# Patient Record
Sex: FEMALE | Race: WHITE | NOT HISPANIC OR LATINO | Employment: OTHER | URBAN - METROPOLITAN AREA
[De-identification: names, ages, dates, MRNs, and addresses within clinical notes are randomized per-mention and may not be internally consistent; named-entity substitution may affect disease eponyms.]

---

## 2023-09-08 ENCOUNTER — HOSPITAL ENCOUNTER (OUTPATIENT)
Dept: DATA CONVERSION | Facility: HOSPITAL | Age: 73
Discharge: HOME | End: 2023-09-08
Payer: MEDICARE

## 2023-09-08 DIAGNOSIS — E78.00 PURE HYPERCHOLESTEROLEMIA, UNSPECIFIED: ICD-10-CM

## 2023-09-08 DIAGNOSIS — I48.0 PAROXYSMAL ATRIAL FIBRILLATION (MULTI): ICD-10-CM

## 2023-09-08 DIAGNOSIS — E55.9 VITAMIN D DEFICIENCY, UNSPECIFIED: ICD-10-CM

## 2023-09-08 LAB
25(OH)D3 SERPL-MCNC: 46 NG/ML (ref 31–100)
ALBUMIN SERPL-MCNC: 4 GM/DL (ref 3.5–5)
ALBUMIN/GLOB SERPL: 1.3 RATIO (ref 1.5–3)
ALP BLD-CCNC: 43 U/L (ref 35–125)
ALT SERPL-CCNC: 12 U/L (ref 5–40)
ANION GAP SERPL CALCULATED.3IONS-SCNC: 11 MMOL/L (ref 0–19)
APPEARANCE PLAS: CLEAR
AST SERPL-CCNC: 22 U/L (ref 5–40)
BILIRUB SERPL-MCNC: 0.6 MG/DL (ref 0.1–1.2)
BUN SERPL-MCNC: 14 MG/DL (ref 8–25)
BUN/CREAT SERPL: 17.5 RATIO (ref 8–21)
CALCIUM SERPL-MCNC: 9.2 MG/DL (ref 8.5–10.4)
CHLORIDE SERPL-SCNC: 105 MMOL/L (ref 97–107)
CHOLEST SERPL-MCNC: 206 MG/DL (ref 133–200)
CHOLEST/HDLC SERPL: 3 RATIO
CO2 SERPL-SCNC: 24 MMOL/L (ref 24–31)
COLOR SPUN FLD: YELLOW
CREAT SERPL-MCNC: 0.8 MG/DL (ref 0.4–1.6)
DEPRECATED RDW RBC AUTO: 48 FL (ref 37–54)
ERYTHROCYTE [DISTWIDTH] IN BLOOD BY AUTOMATED COUNT: 14.6 % (ref 11.7–15)
FASTING STATUS PATIENT QL REPORTED: ABNORMAL
GFR SERPL CREATININE-BSD FRML MDRD: 78 ML/MIN/1.73 M2
GLOBULIN SER-MCNC: 3 G/DL (ref 1.9–3.7)
GLUCOSE SERPL-MCNC: 88 MG/DL (ref 65–99)
HCT VFR BLD AUTO: 42.4 % (ref 36–44)
HDLC SERPL-MCNC: 68 MG/DL
HGB BLD-MCNC: 14 GM/DL (ref 12–15)
LDLC SERPL CALC-MCNC: 112 MG/DL (ref 65–130)
MCH RBC QN AUTO: 29.6 PG (ref 26–34)
MCHC RBC AUTO-ENTMCNC: 33 % (ref 31–37)
MCV RBC AUTO: 89.6 FL (ref 80–100)
NRBC BLD-RTO: 0 /100 WBC
PLATELET # BLD AUTO: 264 K/UL (ref 150–450)
PMV BLD AUTO: 10.8 CU (ref 7–12.6)
POTASSIUM SERPL-SCNC: 4.9 MMOL/L (ref 3.4–5.1)
PROT SERPL-MCNC: 7 G/DL (ref 5.9–7.9)
RBC # BLD AUTO: 4.73 M/UL (ref 4–4.9)
SODIUM SERPL-SCNC: 140 MMOL/L (ref 133–145)
TRIGL SERPL-MCNC: 132 MG/DL (ref 40–150)
WBC # BLD AUTO: 5.9 K/UL (ref 4.5–11)

## 2023-09-17 PROBLEM — I48.0 PAROXYSMAL ATRIAL FIBRILLATION (MULTI): Status: ACTIVE | Noted: 2023-09-17

## 2023-09-17 PROBLEM — N95.1 MENOPAUSAL SYMPTOM: Status: ACTIVE | Noted: 2023-09-17

## 2023-09-17 PROBLEM — K21.9 GASTROESOPHAGEAL REFLUX DISEASE: Status: ACTIVE | Noted: 2023-09-17

## 2023-09-17 PROBLEM — N81.2 CERVIX PROLAPSED INTO VAGINA: Status: ACTIVE | Noted: 2023-09-17

## 2023-09-17 PROBLEM — J32.9 CHRONIC SINUSITIS: Status: ACTIVE | Noted: 2023-09-17

## 2023-09-17 PROBLEM — J30.9 ALLERGIC RHINITIS: Status: ACTIVE | Noted: 2023-09-17

## 2023-09-17 PROBLEM — J33.9 NASAL POLYPS: Status: ACTIVE | Noted: 2023-09-17

## 2023-09-17 PROBLEM — M48.061 SPINAL STENOSIS OF LUMBAR REGION: Status: ACTIVE | Noted: 2023-09-17

## 2023-09-17 PROBLEM — M54.2 CERVICALGIA: Status: ACTIVE | Noted: 2023-09-17

## 2023-09-17 PROBLEM — D68.9 MEDICATION INDUCED COAGULOPATHY (MULTI): Status: ACTIVE | Noted: 2023-09-17

## 2023-09-17 PROBLEM — T50.905A MEDICATION INDUCED COAGULOPATHY (MULTI): Status: ACTIVE | Noted: 2023-09-17

## 2023-09-17 PROBLEM — E55.9 VITAMIN D DEFICIENCY: Status: ACTIVE | Noted: 2023-09-17

## 2023-09-17 PROBLEM — I35.9 AORTIC VALVE DISEASE: Status: ACTIVE | Noted: 2023-09-17

## 2023-09-17 PROBLEM — I35.1 AORTIC VALVE REGURGITATION: Status: ACTIVE | Noted: 2023-09-17

## 2023-09-17 PROBLEM — M75.51 BURSITIS OF RIGHT SHOULDER: Status: ACTIVE | Noted: 2023-09-17

## 2023-09-17 PROBLEM — E78.00 PURE HYPERCHOLESTEROLEMIA: Status: ACTIVE | Noted: 2023-09-17

## 2023-09-17 RX ORDER — METOPROLOL TARTRATE 25 MG/1
25 TABLET, FILM COATED ORAL 2 TIMES DAILY PRN
COMMUNITY
Start: 2022-08-23

## 2023-09-17 RX ORDER — DIAZEPAM 10 MG/1
TABLET ORAL
COMMUNITY
Start: 2021-12-23 | End: 2023-11-09 | Stop reason: ALTCHOICE

## 2023-09-17 RX ORDER — FAMOTIDINE 20 MG/1
20 TABLET, FILM COATED ORAL 2 TIMES DAILY
COMMUNITY
End: 2024-01-16 | Stop reason: HOSPADM

## 2023-09-17 RX ORDER — GABAPENTIN 100 MG/1
200 CAPSULE ORAL NIGHTLY
COMMUNITY
End: 2024-01-16 | Stop reason: HOSPADM

## 2023-09-17 RX ORDER — APIXABAN 5 MG/1
5 TABLET, FILM COATED ORAL 2 TIMES DAILY
COMMUNITY
Start: 2020-09-29 | End: 2024-01-21

## 2023-09-17 RX ORDER — FLUTICASONE PROPIONATE 50 MCG
1-2 SPRAY, SUSPENSION (ML) NASAL DAILY
COMMUNITY

## 2023-09-17 RX ORDER — CHOLECALCIFEROL (VITAMIN D3) 50 MCG
4000 TABLET ORAL DAILY
COMMUNITY
End: 2024-01-16 | Stop reason: HOSPADM

## 2023-09-17 RX ORDER — SOTALOL HYDROCHLORIDE 80 MG/1
TABLET ORAL
COMMUNITY
Start: 2020-12-17 | End: 2024-02-06

## 2023-09-17 RX ORDER — DENOSUMAB 60 MG/ML
INJECTION SUBCUTANEOUS
COMMUNITY
End: 2024-01-16 | Stop reason: HOSPADM

## 2023-11-02 ENCOUNTER — HOSPITAL ENCOUNTER (OUTPATIENT)
Dept: RADIOLOGY | Facility: HOSPITAL | Age: 73
Discharge: HOME | End: 2023-11-02
Payer: MEDICARE

## 2023-11-02 DIAGNOSIS — S73.192A OTHER SPRAIN OF LEFT HIP, INITIAL ENCOUNTER: ICD-10-CM

## 2023-11-02 DIAGNOSIS — M16.12 UNILATERAL PRIMARY OSTEOARTHRITIS, LEFT HIP: ICD-10-CM

## 2023-11-02 PROCEDURE — 73721 MRI JNT OF LWR EXTRE W/O DYE: CPT | Mod: LT

## 2023-11-09 ENCOUNTER — HOSPITAL ENCOUNTER (OUTPATIENT)
Dept: RADIOLOGY | Facility: CLINIC | Age: 73
Discharge: HOME | End: 2023-11-09
Payer: MEDICARE

## 2023-11-09 ENCOUNTER — OFFICE VISIT (OUTPATIENT)
Dept: ORTHOPEDIC SURGERY | Facility: CLINIC | Age: 73
End: 2023-11-09
Payer: MEDICARE

## 2023-11-09 VITALS — HEIGHT: 62 IN | WEIGHT: 125 LBS | BODY MASS INDEX: 23 KG/M2

## 2023-11-09 DIAGNOSIS — M16.12 PRIMARY OSTEOARTHRITIS OF LEFT HIP: ICD-10-CM

## 2023-11-09 DIAGNOSIS — M25.59 PAIN IN OTHER SPECIFIED JOINT: ICD-10-CM

## 2023-11-09 DIAGNOSIS — M25.552 LEFT HIP PAIN: ICD-10-CM

## 2023-11-09 DIAGNOSIS — M19.19 POST-TRAUMATIC OSTEOARTHRITIS, OTHER SPECIFIED SITE: ICD-10-CM

## 2023-11-09 PROCEDURE — 99203 OFFICE O/P NEW LOW 30 MIN: CPT | Performed by: ORTHOPAEDIC SURGERY

## 2023-11-09 PROCEDURE — 99213 OFFICE O/P EST LOW 20 MIN: CPT | Performed by: ORTHOPAEDIC SURGERY

## 2023-11-09 PROCEDURE — 73502 X-RAY EXAM HIP UNI 2-3 VIEWS: CPT | Mod: LT,FY

## 2023-11-09 PROCEDURE — 1160F RVW MEDS BY RX/DR IN RCRD: CPT | Performed by: ORTHOPAEDIC SURGERY

## 2023-11-09 PROCEDURE — 1159F MED LIST DOCD IN RCRD: CPT | Performed by: ORTHOPAEDIC SURGERY

## 2023-11-09 PROCEDURE — 1125F AMNT PAIN NOTED PAIN PRSNT: CPT | Performed by: ORTHOPAEDIC SURGERY

## 2023-11-09 PROCEDURE — 1036F TOBACCO NON-USER: CPT | Performed by: ORTHOPAEDIC SURGERY

## 2023-11-09 RX ORDER — VANCOMYCIN 1 G/200ML
1000 INJECTION, SOLUTION INTRAVENOUS ONCE
Status: CANCELLED | OUTPATIENT
Start: 2024-01-10 | End: 2023-11-09

## 2023-11-09 RX ORDER — SODIUM CHLORIDE 9 MG/ML
100 INJECTION, SOLUTION INTRAVENOUS CONTINUOUS
Status: CANCELLED | OUTPATIENT
Start: 2024-01-10

## 2023-11-09 RX ORDER — TRANEXAMIC ACID 100 MG/ML
1000 INJECTION, SOLUTION INTRAVENOUS ONCE
Status: CANCELLED | OUTPATIENT
Start: 2024-01-10

## 2023-11-09 ASSESSMENT — PATIENT HEALTH QUESTIONNAIRE - PHQ9
SUM OF ALL RESPONSES TO PHQ9 QUESTIONS 1 AND 2: 0
1. LITTLE INTEREST OR PLEASURE IN DOING THINGS: NOT AT ALL
2. FEELING DOWN, DEPRESSED OR HOPELESS: NOT AT ALL

## 2023-11-09 ASSESSMENT — ENCOUNTER SYMPTOMS
LOSS OF SENSATION IN FEET: 0
OCCASIONAL FEELINGS OF UNSTEADINESS: 0
DEPRESSION: 0

## 2023-11-09 ASSESSMENT — PAIN - FUNCTIONAL ASSESSMENT: PAIN_FUNCTIONAL_ASSESSMENT: 0-10

## 2023-11-09 ASSESSMENT — PAIN SCALES - GENERAL: PAINLEVEL_OUTOF10: 8

## 2023-11-09 ASSESSMENT — PAIN DESCRIPTION - DESCRIPTORS: DESCRIPTORS: ACHING

## 2023-11-09 NOTE — PROGRESS NOTES
Subjective      Chief Complaint   Patient presents with    Left Hip - Pain        No surgery found     HPI  This 73 year young woman presents today with left hip pain (8/10). The patient states that this left hip pain has been worsening and persistent for years. The patient denies trauma or injury to the left hip. The patient states that the left  hip pain is worse with and aggravated by prolonged walking and standing. The patient states that she feels persistent left hip instability. The patient states that this left hip pain impairs their ability to complete normal activities of daily living. The patient has tried Tylenol and ibuprofen with no relief.      JOINT REPLACEMENT HISTORY    Primary joint affected: left hip     Duration of symptoms: years    Joint replacement related history:  Osteoarthritis Severe  Avascular necrosis: Yes    Failed nonsurgical treatment:   NSAID/ COXIB: Yes  Weight loss: Yes  Physical therapy: Yes  Intra-articular injection: No  Braces, orthotics, or assistive devices: Yes    Radiological indications for replacement:   Subchondral cyst: Yes  Subchondral sclerosis: Yes  Periarticular osteophytes: Yes  Joint subluxation: Yes  Joint space narrowing: Yes    Highest level of walking support:   Cane, wheelchair or walker    Pain History:   Pain at rest: Severe  Pain when weigth bearing: Severe  Pain with passive ROM: Severe  Pain related ADL limitation: Severe  Abnormal findings on physical exam: Severe    Ability to walk without significant pain:  Household ambulator or less than 1 block    Is the patients current pain regimen controlling their joint pain? No     CARDIOLOGY:   Negative for chest pain, shortness of breath.   RESPIRATORY:   Negative for chest pain, shortness of breath.   MUSCULOSKELETAL:   See HPI for details.   NEUROLOGY:   Negative for tingling, numbness, weakness.    Objective    There were no vitals filed for this visit.    Physical Exam  GENERAL:          General  Appearance:  This is a pleasant patient with appropriate affect, in no acute distress.   DERMATOLOGY:          Skin: skin at the neck, upper and lower back, and trunk is intact. There is no evidence of skin rash, skin breakdown or ulceration, or atrophic skin change.   EXTREMITIES:          Vascular:  Right, left hands and feet are warm with good color and pulses. Right and left calf and thigh are nontender and nonswollen.   NEUROLOGICAL:          Orientation:  Patient is alert and oriented to person, place, time and situation. Right and left upper and lower extremity motor and sensory examinations are intact.      MUSCULOSKELETAL: Neck: Nontender. No pain with range of motion.  Back: No tenderness. Straight leg test negative bilaterally. right hips: Nontender. No pain or limitation with ROM. Left hip: There is diffuse tenderness over the greater trochanter. There is pain with and limitation of  ROM in external and internal rotation, abduction and abduction.  Lower extremity in good position. Nontender in the left calf.  Right hip: Full active and passive painless range of motion.  Right and left knee: nontender. No pain or limitation with ROM. The patient is seen today walking with a painful gait favoring the left hip while walking.  Previous MRI of the left hip done at [facility] reviewed with the patient in the office today and showed severe osteoarthritis of the left hip with areas of bone-on-bone.  X-rays of the left hip done and read in the office today show severe osteoarthritis of the left hip with loss of joint surface cartilage, sclerosis and spurring.    XR hip left 2 or 3 views    Result Date: 11/9/2023  These images are not reportable by radiology and will not be interpreted by  Radiologists.    MR hip left wo IV contrast    Result Date: 11/2/2023  Interpreted By:  Ever Grove, STUDY: MR HIP LEFT WO IV CONTRAST; 11/2/2023 2:29 pm   INDICATION: Signs/Symptoms:MRI LEFT HIP WO./left hip pain 2  months without injury   COMPARISON: None   ACCESSION NUMBER(S): [id number]   ORDERING CLINICIAN: MACHO ZEPEDA   TECHNIQUE: Multiple, multiplanar sequences were obtained of the left hip.   FINDINGS: No MR evidence of avascular necrosis of the hip is present. No acute fracture or dislocation is seen. Moderate hip effusion is noted in the left.   Areas of complete loss of femoroacetabular articular cartilage can be seen. Areas of mild sclerosis subchondral cystic changes can be seen along the acetabular margin with osteophytic changes identified.   No trochanteric bursitis is identified.   The SI joints are intact. Degenerative disc disease and spondylosis of the lumbar spine is identified.   The marrow is appears grossly normal. The musculature and soft tissues are unremarkable.   There is a macerated labrum noted on this non-arthrographic study not unexpected for patient of this age. No perilabral cyst formation noted.   Iliopsoas tendon is unremarkable. Trace left iliopsoas bursitis is noted.   The gluteus medius and gluteus minimus tendons are intact. However, tendinosis is noted.   The hamstring origins tendinosis is identified.   Included portions visualized contralateral hip reveal osteoarthritic changes.   MR sequences were not optimized for evaluation of intrapelvic contents. However, no gross abnormality is seen.       1. Moderate left hip effusion. 2. Severe left hip osteoarthrosis. 3. Trace left iliopsoas bursitis.   MACRO: None   Signed by: Ever Grove 11/2/2023 3:14 PM Dictation workstation:   [id number]       Ruth was seen today for pain.  Diagnoses and all orders for this visit:  Left hip pain (Primary)  Primary osteoarthritis of left hip  -     XR hip left 2 or 3 views; Future  Options are discussed with the patient in the presence of her  in detail. The patient is instructed regarding activity for balance and support, ice, gentle strengthening and range of motion exercises, and the  appropriate use of Tylenol as needed for pain with its potential adverse reactions and side effects. The patient understands. The patient states that despite all of the treatment listed above, that this left hip pain is disabling and impairs the patient´s ability to do their activities of daily living. The patient has gait instability and is concerned regarding risk for further injury from this persistent left hip instability. The patient requests a discussion of further options including surgical options. On physical examination there is pain with and marked limitation of range of motions especially internal rotation abduction and adduction. There is an unstable gait and a Trendelenburg gait with the patient favoring the left hip. X-ray and MRI show severe osteoarthritis of the left hip with complete loss of joint surface cartilage, bone on bone, sclerosis and degenerative cysts. , Options are discussed with the patient in detail. Left total hip replacement with indications, alternatives, potential risks including but not lkimited to risk of infection, blood clot, blood loss, dislocation, nerve or blood vessel damage, stroke or death, benefits, unforseen risks, the rehab involved and the fact that no guarantee can be made were all discussed with the patient in detail. The patient understands, accepts and wishes to proceed because the left hip pain is disabling and impairs the patients ability to complete activities of daily living that are important to them and because of the multiple episodes of instability the patient wishes to proceed with left total hip replacement. The patient understands and agrees.  Her  and I agree. We will be setting this up for a time that is convenient to the patient and as the schedule allows.  This patient does take Eliquis for atrial fibrillation and has been instructed to stop Eliquis 4 days preoperatively.  She has been seen by her cardiologist, Dr. Watts told her that she is  doing well medically.  I instructed her to call Dr. Watts prior to the operation and is soon as possible to discuss the operation with him and she will do this.,, Please note that this report has been produced using speech recognition software. It may contain errors related to grammar, punctuation or spelling. Electronically signed, but not reviewed.modification and fall precautions and risk for further injury with falling or trauma and the use of a cane    Tirso Medina MD

## 2023-11-20 ENCOUNTER — TELEPHONE (OUTPATIENT)
Dept: ORTHOPEDIC SURGERY | Facility: CLINIC | Age: 73
End: 2023-11-20
Payer: MEDICARE

## 2023-11-20 NOTE — TELEPHONE ENCOUNTER
Patient would like to know if she can have her Prolia injection for Osteoarthritis and her covid vaccine before her surgery on January 10, 2024.  Both of these would be done 30 days before Surgery

## 2023-12-21 ENCOUNTER — OFFICE VISIT (OUTPATIENT)
Dept: CARDIOLOGY | Facility: CLINIC | Age: 73
End: 2023-12-21
Payer: MEDICARE

## 2023-12-21 VITALS
WEIGHT: 128 LBS | OXYGEN SATURATION: 99 % | HEART RATE: 94 BPM | SYSTOLIC BLOOD PRESSURE: 126 MMHG | BODY MASS INDEX: 23.79 KG/M2 | DIASTOLIC BLOOD PRESSURE: 90 MMHG

## 2023-12-21 DIAGNOSIS — I35.1 AORTIC VALVE INSUFFICIENCY, ETIOLOGY OF CARDIAC VALVE DISEASE UNSPECIFIED: ICD-10-CM

## 2023-12-21 DIAGNOSIS — Z01.810 PREOP CARDIOVASCULAR EXAM: Primary | ICD-10-CM

## 2023-12-21 DIAGNOSIS — I48.0 PAROXYSMAL ATRIAL FIBRILLATION (MULTI): ICD-10-CM

## 2023-12-21 PROCEDURE — 99213 OFFICE O/P EST LOW 20 MIN: CPT | Performed by: INTERNAL MEDICINE

## 2023-12-21 PROCEDURE — 1036F TOBACCO NON-USER: CPT | Performed by: INTERNAL MEDICINE

## 2023-12-21 PROCEDURE — 1126F AMNT PAIN NOTED NONE PRSNT: CPT | Performed by: INTERNAL MEDICINE

## 2023-12-21 PROCEDURE — 93010 ELECTROCARDIOGRAM REPORT: CPT | Performed by: INTERNAL MEDICINE

## 2023-12-21 PROCEDURE — 93005 ELECTROCARDIOGRAM TRACING: CPT | Performed by: INTERNAL MEDICINE

## 2023-12-21 PROCEDURE — 1160F RVW MEDS BY RX/DR IN RCRD: CPT | Performed by: INTERNAL MEDICINE

## 2023-12-21 PROCEDURE — 1159F MED LIST DOCD IN RCRD: CPT | Performed by: INTERNAL MEDICINE

## 2023-12-21 ASSESSMENT — ENCOUNTER SYMPTOMS
DYSPNEA ON EXERTION: 1
DEPRESSION: 1
GASTROINTESTINAL NEGATIVE: 1
ENDOCRINE NEGATIVE: 1
FEVER: 0
CONSTITUTIONAL NEGATIVE: 1
SHORTNESS OF BREATH: 1
IRREGULAR HEARTBEAT: 1
NEUROLOGICAL NEGATIVE: 1
LOSS OF SENSATION IN FEET: 0
EYES NEGATIVE: 1
OCCASIONAL FEELINGS OF UNSTEADINESS: 0

## 2023-12-21 ASSESSMENT — PAIN SCALES - GENERAL: PAINLEVEL: 0-NO PAIN

## 2023-12-21 NOTE — PROGRESS NOTES
Subjective      Chief Complaint   Patient presents with    s/c  left hip replacement dr colon    01/10/2023          HPI       Review of Systems   Constitutional: Negative. Negative for fever.   HENT: Negative.     Eyes: Negative.    Cardiovascular:  Positive for dyspnea on exertion and irregular heartbeat.   Respiratory:  Positive for shortness of breath.    Endocrine: Negative.    Skin: Negative.    Musculoskeletal:  Positive for arthritis and joint pain.   Gastrointestinal: Negative.    Genitourinary: Negative.    Neurological: Negative.    All other systems reviewed and are negative.       Past Surgical History:   Procedure Laterality Date    OTHER SURGICAL HISTORY  12/13/2021    Balloon sinuplasty    SMALL INTESTINE SURGERY  2001    twisted bowel repair        Active Ambulatory Problems     Diagnosis Date Noted    Chronic sinusitis 09/17/2023    Allergic rhinitis 09/17/2023    Aortic valve disease 09/17/2023    Aortic valve regurgitation 09/17/2023    Bursitis of right shoulder 09/17/2023    Cervicalgia 09/17/2023    Gastroesophageal reflux disease 09/17/2023    Cervix prolapsed into vagina 09/17/2023    Medication induced coagulopathy (CMS/HCC) 09/17/2023    Menopausal symptom 09/17/2023    Nasal polyps 09/17/2023    Paroxysmal atrial fibrillation (CMS/HCC) 09/17/2023    Pure hypercholesterolemia 09/17/2023    Spinal stenosis of lumbar region 09/17/2023    Vitamin D deficiency 09/17/2023    Left hip pain 11/09/2023    Primary osteoarthritis of left hip 11/09/2023    Preop cardiovascular exam 12/21/2023     Resolved Ambulatory Problems     Diagnosis Date Noted    No Resolved Ambulatory Problems     Past Medical History:   Diagnosis Date    A-fib (CMS/HCC)     Hip pain, acute, left     Personal history of diseases of the blood and blood-forming organs and certain disorders involving the immune mechanism     Personal history of other diseases of the digestive system         Visit Vitals  /90   Pulse 94  "  Wt 58.1 kg (128 lb)   SpO2 99%   BMI 23.79 kg/m²   Smoking Status Never   BSA 1.59 m²        Objective     Constitutional:       Appearance: Healthy appearance.   Eyes:      Pupils: Pupils are equal, round, and reactive to light.   Neck:      Vascular: JVD normal.   Pulmonary:      Breath sounds: Normal breath sounds.   Cardiovascular:      PMI at left midclavicular line. Normal rate. Irregularly irregular rhythm. Normal S1. Normal S2.       Murmurs: There is a grade 2/4 high frequency blowing decrescendo, early diastolic murmur, radiating to the apex.      No gallop.  No click. No rub.   Pulses:     Intact distal pulses.   Edema:     Peripheral edema absent.   Abdominal:      Palpations: Abdomen is soft.      Tenderness: There is no abdominal tenderness.   Musculoskeletal:      Extremities: No clubbing present.Skin:     General: Skin is warm and dry.   Neurological:      General: No focal deficit present.            Lab Review:         Lab Results   Component Value Date    CHOL 206 (H) 09/08/2023    CHOL 212 (H) 09/01/2022    CHOL 215 (H) 08/23/2021     Lab Results   Component Value Date    HDL 68 09/08/2023    HDL 76 09/01/2022    HDL 66 08/23/2021     Lab Results   Component Value Date    LDLCALC 112 09/08/2023    LDLCALC 116 09/01/2022    LDLCALC 131 (H) 08/23/2021     Lab Results   Component Value Date    TRIG 132 09/08/2023    TRIG 98 09/01/2022    TRIG 91 08/23/2021     No components found for: \"CHOLHDL\"     Assessment/Plan     Preop cardiovascular exam  Is a 73-year-old white female who needs a left hip replacement.  She does have a paroxysmal atrial fibrillation and is in the atrial fibrillation today.  Her aortic insufficiency sounds the same.  She is not in congestive heart failure nor she having symptoms of angina factors.  Feel she is low risk for surgery and would clear her for her hip replacement.  I have instructed her to stop her Eliquis 3 days before and will start up afterwards.  Will see her " back in a month or 2 and we will assess if she still in atrial fibrillation at that time further evaluation may be necessary

## 2023-12-21 NOTE — ASSESSMENT & PLAN NOTE
Is a 73-year-old white female who needs a left hip replacement.  She does have a paroxysmal atrial fibrillation and is in the atrial fibrillation today.  Her aortic insufficiency sounds the same.  She is not in congestive heart failure nor she having symptoms of angina factors.  Feel she is low risk for surgery and would clear her for her hip replacement.  I have instructed her to stop her Eliquis 3 days before and will start up afterwards.  Will see her back in a month or 2 and we will assess if she still in atrial fibrillation at that time further evaluation may be necessary

## 2023-12-27 ENCOUNTER — LAB (OUTPATIENT)
Dept: LAB | Facility: LAB | Age: 73
End: 2023-12-27
Payer: MEDICARE

## 2023-12-27 ENCOUNTER — PRE-ADMISSION TESTING (OUTPATIENT)
Dept: PREADMISSION TESTING | Facility: HOSPITAL | Age: 73
End: 2023-12-27
Payer: MEDICARE

## 2023-12-27 VITALS
WEIGHT: 127.87 LBS | OXYGEN SATURATION: 98 % | HEIGHT: 61 IN | TEMPERATURE: 96.8 F | BODY MASS INDEX: 24.14 KG/M2 | DIASTOLIC BLOOD PRESSURE: 83 MMHG | HEART RATE: 106 BPM | SYSTOLIC BLOOD PRESSURE: 125 MMHG

## 2023-12-27 DIAGNOSIS — M16.12 PRIMARY OSTEOARTHRITIS OF LEFT HIP: ICD-10-CM

## 2023-12-27 DIAGNOSIS — M19.19 POST-TRAUMATIC OSTEOARTHRITIS, OTHER SPECIFIED SITE: ICD-10-CM

## 2023-12-27 DIAGNOSIS — Z01.818 PREOP TESTING: Primary | ICD-10-CM

## 2023-12-27 DIAGNOSIS — M25.59 PAIN IN OTHER SPECIFIED JOINT: ICD-10-CM

## 2023-12-27 LAB
ABO GROUP (TYPE) IN BLOOD: NORMAL
ALBUMIN SERPL-MCNC: 4.2 G/DL (ref 3.5–5)
ALP BLD-CCNC: 56 U/L (ref 35–125)
ALT SERPL-CCNC: 11 U/L (ref 5–40)
ANION GAP SERPL CALC-SCNC: 13 MMOL/L
ANTIBODY SCREEN: NORMAL
APPEARANCE UR: ABNORMAL
APTT PPP: 30.8 SECONDS (ref 22–32.5)
AST SERPL-CCNC: 20 U/L (ref 5–40)
BASOPHILS # BLD AUTO: 0.07 X10*3/UL (ref 0–0.1)
BASOPHILS NFR BLD AUTO: 1 %
BILIRUB SERPL-MCNC: 0.5 MG/DL (ref 0.1–1.2)
BILIRUB UR STRIP.AUTO-MCNC: NEGATIVE MG/DL
BUN SERPL-MCNC: 15 MG/DL (ref 8–25)
CALCIUM SERPL-MCNC: 9.8 MG/DL (ref 8.5–10.4)
CAOX CRY #/AREA UR COMP ASSIST: ABNORMAL /HPF
CHLORIDE SERPL-SCNC: 102 MMOL/L (ref 97–107)
CO2 SERPL-SCNC: 26 MMOL/L (ref 24–31)
COLOR UR: YELLOW
CREAT SERPL-MCNC: 0.9 MG/DL (ref 0.4–1.6)
EOSINOPHIL # BLD AUTO: 0.36 X10*3/UL (ref 0–0.4)
EOSINOPHIL NFR BLD AUTO: 4.9 %
ERYTHROCYTE [DISTWIDTH] IN BLOOD BY AUTOMATED COUNT: 13.5 % (ref 11.5–14.5)
GFR SERPL CREATININE-BSD FRML MDRD: 67 ML/MIN/1.73M*2
GLUCOSE SERPL-MCNC: 75 MG/DL (ref 65–99)
GLUCOSE UR STRIP.AUTO-MCNC: NORMAL MG/DL
HCT VFR BLD AUTO: 46.1 % (ref 36–46)
HGB BLD-MCNC: 14.4 G/DL (ref 12–16)
HOLD SPECIMEN: NORMAL
HYALINE CASTS #/AREA URNS AUTO: ABNORMAL /LPF
IMM GRANULOCYTES # BLD AUTO: 0.02 X10*3/UL (ref 0–0.5)
IMM GRANULOCYTES NFR BLD AUTO: 0.3 % (ref 0–0.9)
INR PPP: 1.1 (ref 0.9–1.2)
KETONES UR STRIP.AUTO-MCNC: ABNORMAL MG/DL
LEUKOCYTE ESTERASE UR QL STRIP.AUTO: NEGATIVE
LYMPHOCYTES # BLD AUTO: 2.21 X10*3/UL (ref 0.8–3)
LYMPHOCYTES NFR BLD AUTO: 30.2 %
MCH RBC QN AUTO: 29 PG (ref 26–34)
MCHC RBC AUTO-ENTMCNC: 31.2 G/DL (ref 32–36)
MCV RBC AUTO: 93 FL (ref 80–100)
MONOCYTES # BLD AUTO: 0.95 X10*3/UL (ref 0.05–0.8)
MONOCYTES NFR BLD AUTO: 13 %
MUCOUS THREADS #/AREA URNS AUTO: ABNORMAL /LPF
NEUTROPHILS # BLD AUTO: 3.7 X10*3/UL (ref 1.6–5.5)
NEUTROPHILS NFR BLD AUTO: 50.6 %
NITRITE UR QL STRIP.AUTO: NEGATIVE
NRBC BLD-RTO: 0 /100 WBCS (ref 0–0)
PH UR STRIP.AUTO: 5 [PH]
PLATELET # BLD AUTO: 280 X10*3/UL (ref 150–450)
POTASSIUM SERPL-SCNC: 4.7 MMOL/L (ref 3.4–5.1)
PROT SERPL-MCNC: 6.9 G/DL (ref 5.9–7.9)
PROT UR STRIP.AUTO-MCNC: ABNORMAL MG/DL
PROTHROMBIN TIME: 11.9 SECONDS (ref 9.3–12.7)
RBC # BLD AUTO: 4.96 X10*6/UL (ref 4–5.2)
RBC # UR STRIP.AUTO: NEGATIVE /UL
RBC #/AREA URNS AUTO: ABNORMAL /HPF
RH FACTOR (ANTIGEN D): NORMAL
SODIUM SERPL-SCNC: 141 MMOL/L (ref 133–145)
SP GR UR STRIP.AUTO: 1.03
SQUAMOUS #/AREA URNS AUTO: ABNORMAL /HPF
UROBILINOGEN UR STRIP.AUTO-MCNC: ABNORMAL MG/DL
WBC # BLD AUTO: 7.3 X10*3/UL (ref 4.4–11.3)
WBC #/AREA URNS AUTO: ABNORMAL /HPF

## 2023-12-27 PROCEDURE — 99204 OFFICE O/P NEW MOD 45 MIN: CPT | Performed by: NURSE PRACTITIONER

## 2023-12-27 PROCEDURE — 36415 COLL VENOUS BLD VENIPUNCTURE: CPT

## 2023-12-27 PROCEDURE — 86900 BLOOD TYPING SEROLOGIC ABO: CPT

## 2023-12-27 PROCEDURE — 80053 COMPREHEN METABOLIC PANEL: CPT

## 2023-12-27 PROCEDURE — 86850 RBC ANTIBODY SCREEN: CPT

## 2023-12-27 PROCEDURE — 85610 PROTHROMBIN TIME: CPT

## 2023-12-27 PROCEDURE — 85025 COMPLETE CBC W/AUTO DIFF WBC: CPT

## 2023-12-27 PROCEDURE — 86901 BLOOD TYPING SEROLOGIC RH(D): CPT

## 2023-12-27 PROCEDURE — 94760 N-INVAS EAR/PLS OXIMETRY 1: CPT

## 2023-12-27 PROCEDURE — 85730 THROMBOPLASTIN TIME PARTIAL: CPT

## 2023-12-27 PROCEDURE — 87081 CULTURE SCREEN ONLY: CPT | Mod: WESLAB | Performed by: NURSE PRACTITIONER

## 2023-12-27 PROCEDURE — 81001 URINALYSIS AUTO W/SCOPE: CPT

## 2023-12-27 RX ORDER — CHLORHEXIDINE GLUCONATE ORAL RINSE 1.2 MG/ML
15 SOLUTION DENTAL AS NEEDED
Qty: 120 ML | Refills: 0 | Status: SHIPPED | OUTPATIENT
Start: 2024-01-09 | End: 2024-01-16 | Stop reason: HOSPADM

## 2023-12-27 ASSESSMENT — ENCOUNTER SYMPTOMS
CONSTITUTIONAL NEGATIVE: 1
NEUROLOGICAL NEGATIVE: 1
EYES NEGATIVE: 1
GASTROINTESTINAL NEGATIVE: 1
PSYCHIATRIC NEGATIVE: 1

## 2023-12-27 ASSESSMENT — CHADS2 SCORE
CHF: NO
AGE GREATER THAN OR EQUAL TO 75: NO
DIABETES: NO
CHADS2 SCORE: 0
PRIOR STROKE OR TIA OR THROMBOEMBOLISM: NO
HYPERTENSION: NO

## 2023-12-27 ASSESSMENT — DUKE ACTIVITY SCORE INDEX (DASI)
CAN YOU CLIMB A FLIGHT OF STAIRS OR WALK UP A HILL: NO
CAN YOU DO LIGHT WORK AROUND THE HOUSE LIKE DUSTING OR WASHING DISHES: YES
CAN YOU WALK INDOORS, SUCH AS AROUND YOUR HOUSE: YES
CAN YOU DO HEAVY WORK AROUND THE HOUSE LIKE SCRUBBING FLOORS OR LIFTING AND MOVING HEAVY FURNITURE: NO
CAN YOU DO YARD WORK LIKE RAKING LEAVES, WEEDING OR PUSHING A MOWER: NO
CAN YOU RUN A SHORT DISTANCE: NO
DASI METS SCORE: 4.6
TOTAL_SCORE: 15.2
CAN YOU DO MODERATE WORK AROUND THE HOUSE LIKE VACUUMING, SWEEPING FLOORS OR CARRYING GROCERIES: NO
CAN YOU TAKE CARE OF YOURSELF (EAT, DRESS, BATHE, OR USE TOILET): YES
CAN YOU PARTICIPATE IN STRENOUS SPORTS LIKE SWIMMING, SINGLES TENNIS, FOOTBALL, BASKETBALL, OR SKIING: NO
CAN YOU PARTICIPATE IN MODERATE RECREATIONAL ACTIVITIES LIKE GOLF, BOWLING, DANCING, DOUBLES TENNIS OR THROWING A BASEBALL OR FOOTBALL: NO
CAN YOU WALK A BLOCK OR TWO ON LEVEL GROUND: YES
CAN YOU HAVE SEXUAL RELATIONS: YES

## 2023-12-27 ASSESSMENT — PAIN - FUNCTIONAL ASSESSMENT: PAIN_FUNCTIONAL_ASSESSMENT: 0-10

## 2023-12-27 ASSESSMENT — PAIN DESCRIPTION - DESCRIPTORS: DESCRIPTORS: ACHING

## 2023-12-27 ASSESSMENT — PAIN SCALES - GENERAL: PAINLEVEL_OUTOF10: 7

## 2023-12-27 NOTE — PREPROCEDURE INSTRUCTIONS
Medication List            Accurate as of December 27, 2023  9:07 AM. Always use your most recent med list.                cholecalciferol 50 MCG (2000 UT) tablet  Commonly known as: Vitamin D-3  Medication Adjustments for Surgery: Stop 7 days before surgery     Eliquis 5 mg tablet  Generic drug: apixaban  Notes to patient: PER DR. VILLASENOR LAST DOSE SUNDAY, PATIENT WILL VERIFY WITH DR. VILLASENOR     famotidine 20 mg tablet  Commonly known as: Pepcid  Medication Adjustments for Surgery: Continue until night before surgery     fluticasone 50 mcg/actuation nasal spray  Commonly known as: Flonase  Medication Adjustments for Surgery: Take morning of surgery with sip of water, no other fluids     gabapentin 100 mg capsule  Commonly known as: Neurontin  Medication Adjustments for Surgery: Continue until night before surgery     metoprolol tartrate 25 mg tablet  Commonly known as: Lopressor  Medication Adjustments for Surgery: Take morning of surgery with sip of water, no other fluids     Prolia 60 mg/mL syringe  Generic drug: denosumab     sotalol 80 mg tablet  Commonly known as: Betapace  Medication Adjustments for Surgery: Take morning of surgery with sip of water, no other fluids                              NPO Instructions:    Do not eat any food after midnight the night before your surgery/procedure.  You may have clear liquids until TWO hours before surgery/procedure. This includes water, black tea/coffee, (no milk or cream) apple juice and electrolyte drinks (Gatorade).    Additional Instructions:     Day of Surgery:  Review your medication instructions, take indicated medications  You may have clear liquids until TWO hours before surgery/procedure.  This includes water, black tea/coffee, (no milk or cream) apple juice and electrolyte drinks (Gatorade)  Wear  comfortable loose fitting clothing  Do not use moisturizers, creams, lotions or perfume  All jewelry and valuables should be left at home   Home Preoperative  Antibacterial Shower    What is a home preoperative antibacterial shower?  This shower is a way of cleaning the skin with a germ killing solution before surgery. The solution contains chlorhexidine, commonly known as CHG. CHG is a skin cleanser with germ killing ability. Let your doctor know if you are allergic to chlorhexidine.      Why do I need to take a preoperative antibacterial shower?  Skin is not sterile. It is best to try to make your skin as free of germs as possible before surgery. Proper cleansing with a germ killing soap before surgery can lower the number of germs on your skin. This helps to reduce the risk of infection at the surgical site. Following the instructions listed below will help you prepare your skin for surgery.    How do I use the solution?      Steps: Begin using your CHG soap FIVE DAYS BEFORE your scheduled surgery on ___START JANUARY 6  First, wash and rinse your hair.   CHG away soap away from ear canals and eyes.  Rinse completely, do not condition. Hair extensions should be removed.  Wash your face with your normal soap and rinse.  Apply the CHG solution to a clean wet washcloth. Turn the water off or move away from the water spray to avoid premature rinsing of the CHG soap as you are applying.  Firmly lather your entire body from neck down. Do not use on face.  Pay special attention to the areas(s) where your incision(s) will be located unless they are on your face.  Avoid scrubbing your skin too hard.  The important point is to have the CHG soap sit on your skin for 3 minutes.  DO NOT wash with regular soap after you have used the CHG soap solution.  Pat yourself dry with a clean, freshly laundered towel.  DO NOT apply powders, deodorants or lotions.  Dress in clean, freshly laundered night clothes.  Be sure to sleep with clean, freshly laundered sheets.  Be aware that CHG will cause stains on fabrics; if you wash them with bleach after use. Rinse your washcloth and other linens  that have contact with CHG completely. Use only non-chlorine detergents to launder the items used.  The morning of surgery is the fifth day. Repeat the above steps and dress in clean comfortable clothing.   Patient Information: Oral/Dental Rinse    What is oral/dental rinse?  It is a mouthwash. It is a way of cleaning the mouth with a germ killing solution before your surgery. The solution contains chlorhexidine, commonly know as CHG.  It is used inside the mouth to kill bacteria known as Staphylococcus aureus.  Let your doctor know if you are allergic to chlorhexidine.    Why do I need to use CHG oral/dental rinse?  The CHG oral/dental rinse helps to kill bacteria in your mouth known as Staphylococcus aureus. This reduces the risk of infection at the surgical site.    Using your CHG oral/dental rinse.  AT YOUR PHARMACY  STEPS: use your CHG oral/dental rinse after you brush your teeth the night before (at bedtime) and the morning of your surgery. Follow the directions on your prescription label.  Use the cap on the container to measure 15ml (fill cap to fill line)  Swish (gargle if you can) the mouthwash in your mouth for at least 30 seconds, (do not swallow) spit out.  After you use your CHG rinse, do not rinse your mouth with water, drink or eat. Please refer to prescription label for the appropriate time to resume oral intake.    What side effects might I have using the CHG oral/dental rinse?  CHG rinse will stick to the plaque on the teeth. Brush and floss just before use. Teeth brushing will help avoid staining of plaque during use.  PAT DISCHARGE INSTRUCTIONS    Please call the Same Day Surgery (SDS) Department of the hospital where your procedure will be performed after 2:00 PM the day before your surgery. If you are scheduled on a Monday, or a Tuesday following a Monday holiday, you will need to call on the last business day prior to your surgery.    [facility]  [address], [address] [phone]      Please let your surgeon know if:      You develop any open sores, shingles, burning or painful urination as these may increase your risk of an infection.   You no longer wish to have the surgery.   Any other personal circumstances change that may lead to the need to cancel or defer this surgery-such as being sick or getting admitted to any hospital within one week of your planned procedure.    Your contact details change, such as a change of address or phone number.    Starting now:     Please DO NOT drink alcohol or smoke for 24 hours before surgery. It is well known that quitting smoking can make a huge difference to your health and recovery from surgery. The longer you abstain from smoking, the better your chances of a healthy recovery. If you need help with quitting, call 8-935-QUIT-NOW to be connected to a trained counselor who will discuss the best methods to help you quit.     Before your surgery:    Please stop all supplements 7 days prior to surgery. Or as directed by your surgeon.   Please stop taking NSAID pain medicine such as Advil and Motrin 7 days before surgery.    If you develop any fever, cough, cold, rashes, cuts, scratches, scrapes, urinary symptoms or infection anywhere on your body (including teeth and gums) prior to surgery, please call your surgeon’s office as soon as possible. This may require treatment to reduce the chance of cancellation on the day of surgery.    The day before your surgery:   DIET- Do not eat any food after MIDNIGHT. May have 10 ounces of CLEAR LIQUIDS until TWO HOURS before your arrival time. This includes water, black tea or coffee (no milk ir cream), apple juice and electrolyte drinks (Gatorade). May chew gum until TWO hours before your surgery time.   Get a good night’s rest.  Use the special soap for bathing if you have been instructed to use one.    Scheduled surgery times may change and you will  be notified if this occurs - please check your personal voicemail for any updates.     On the morning of surgery:   Wear comfortable, loose fitting clothes which open in the front. Please do not wear moisturizers, creams, lotions, makeup or perfume.    Please bring with you to surgery:   Photo ID and insurance card   Current list of medicines and allergies   Pacemaker/ Defibrillator/Heart stent cards   CPAP machine and mask    Slings/ splints/ crutches   A copy of your complete advanced directive/DHPOA.    Please do NOT bring with you to surgery:   All jewelry and valuables should be left at home.   Prosthetic devices such as contact lenses, hearing aids, dentures, eyelash extensions, hairpins and body piercings must be removed prior to going in to the surgical suite.    After outpatient surgery:   A responsible adult MUST accompany you at the time of discharge and stay with you for 24 hours after your surgery. You may NOT drive yourself home after surgery.    Do not drive, operate machinery, make critical decisions or do activities that require co-ordination or balance until after a night’s sleep.   Do not drink alcoholic beverages for 24 hours.   Instructions for resuming your medications will be provided by your surgeon.    CALL YOUR DOCTOR AFTER SURGERY IF YOU HAVE:     Chills and/or a fever of 101° F or higher.    Redness, swelling, pus or drainage from your surgical wound or a bad smell from the wound.    Lightheadedness, fainting or confusion.    Persistent vomiting (throwing up) and are not able to eat or drink for 12 hours.    Three or more loose, watery bowel movements in 24 hours (diarrhea).   Difficulty or pain while urinating( after non-urological surgery)    Pain and swelling in your legs, especially if it is only on one side.    Difficulty breathing or are breathing faster than normal.    Any new concerning symptoms.    Who should I contact if I have questions about the CHG oral/dental rinse?  Please  call [facility], [facility] at [phone] if you have any questions.    Who should I call if I have any questions regarding the use of CHG soap?  Call the [facility] at [phone] if you have any questions.

## 2023-12-27 NOTE — CPM/PAT H&P
CPM/PAT Evaluation       Name: Ruth Veloz (Ruth Veloz)  /Age: 1950/73 y.o.     In-Person       Chief Complaint: left hip pain    HPI  Pt is a 73 year old female with left hip osteoarthritis. Pt stated over the past year she has had worsening left hip pain. Pt denies trauma or injury to her left hip. Pt tried cortisone injections and physical therapy with no improvement from the pain. Pt describes the pain as an aching sharp pain that radiates from her left hip to her left ankle. The left hip pain is aggravated by walking and ROM of left hip. Pt has been taking Tylenol that has helped improve the pain. Pt denies numbness and tingling in her left foot. Pt denies falls.    Past Medical History:   Diagnosis Date    A-fib (CMS/[facility])     GERD (gastroesophageal reflux disease)     Hip pain, acute, left     Osteopenia     Personal history of diseases of the blood and blood-forming organs and certain disorders involving the immune mechanism     History of autoimmune disorder    Personal history of other diseases of the digestive system     History of gastroesophageal reflux (GERD)       Past Surgical History:   Procedure Laterality Date    OTHER SURGICAL HISTORY  2021    Balloon sinuplasty    SMALL INTESTINE SURGERY      twisted bowel repair       Social History     Tobacco Use    Smoking status: Never    Smokeless tobacco: Never   Substance Use Topics    Alcohol use: Not Currently     Social History     Substance and Sexual Activity   Drug Use Never     Family History   Problem Relation Name Age of Onset    Other (leaky valve) Mother      Stroke Mother      Hypertension Mother      Kidney failure Father      Cushing syndrome Sister      No Known Problems Brother      No Known Problems Daughter       Current Outpatient Medications   Medication Sig Dispense Refill    cholecalciferol (Vitamin D-3) 50 MCG ( UT) tablet Take 2 tablets (4,000 Units) by mouth once daily.      denosumab  "(Prolia) 60 mg/mL syringe Inject under the skin.      Eliquis 5 mg tablet Take 1 tablet (5 mg) by mouth 2 times a day.      famotidine (Pepcid) 20 mg tablet Take 1 tablet (20 mg) by mouth 2 times a day. As needed      fluticasone (Flonase) 50 mcg/actuation nasal spray Administer 1-2 sprays into each nostril once daily.      gabapentin (Neurontin) 100 mg capsule Take 2 capsules (200 mg) by mouth once daily at bedtime.      metoprolol tartrate (Lopressor) 25 mg tablet Take 1 tablet (25 mg) by mouth 2 times a day as needed.      sotalol (Betapace) 80 mg tablet Take by mouth. one tab in the am and 1/2 at night Orally       No current facility-administered medications for this visit.       Allergies   Allergen Reactions    Amoxicillin Hives    Amoxicillin-Pot Clavulanate Hives    Flunisolide Hives    Penicillin Hives    Sulfa (Sulfonamide Antibiotics) Unknown     Review of Systems   Constitutional: Negative.    HENT: Negative.     Eyes: Negative.    Respiratory:          Mild SOB on exertion   Cardiovascular:         Irregular heart rhythm   Gastrointestinal: Negative.    Genitourinary: Negative.    Musculoskeletal:         See HPI   Skin: Negative.    Neurological: Negative.    Psychiatric/Behavioral: Negative.         /83   Pulse 106   Temp 36 °C (96.8 °F) (Temporal)   Ht 1.549 m (5' 1\")   Wt 58 kg (127 lb 13.9 oz)   SpO2 98%   BMI 24.16 kg/m²       Physical Exam  Vitals reviewed.   Constitutional:       Appearance: Normal appearance.   HENT:      Head: Normocephalic and atraumatic.      Nose: Nose normal.   Cardiovascular:      Rate and Rhythm: Normal rate. Rhythm irregular.      Pulses: Normal pulses.      Heart sounds: Murmur heard.   Pulmonary:      Effort: Pulmonary effort is normal.      Breath sounds: Normal breath sounds.   Abdominal:      General: Bowel sounds are normal.      Palpations: Abdomen is soft.   Musculoskeletal:      Cervical back: Normal range of motion.      Comments: Left hip pain " with ROM   Skin:     General: Skin is warm and dry.   Neurological:      General: No focal deficit present.      Mental Status: She is alert and oriented to person, place, and time.   Psychiatric:         Mood and Affect: Mood normal.         Behavior: Behavior normal.         Thought Content: Thought content normal.        PAT AIRWAY:   Airway:     Mallampati::  II    TM distance::  >3 FB    Neck ROM::  Full    DASI: 15.2  METS: 4.6  CHADS:1.9%  RCRI: 0.4%  STOP BAN    Assessment and Plan:     Primary Osteoarthritis of left hip: hip replacement total uncement unilat Depuy implants  Atrial fibrillation: Pt is followed by Dr. Watts; Pt is taking Eliquis, Metoprolol tartrate, and sotalol.  Aortic insufficiency: followed by Dr Watts; Cardiac clearance completed by Dr Watts; last ECHO: 2022 : The left ventricular chamber size is mildly dilated. There is mildly decreased left ventricular systolic function. Estimated ejection fraction is 50-54%. The left atrial size is mild to moderately dilated. Right atrial cavity size is mildly dilated. Mild to moderate aortic regurgitation. Mild to moderate mitral regurgitation.    Ginny Han, APRN-CNP

## 2023-12-27 NOTE — H&P (VIEW-ONLY)
CPM/PAT Evaluation       Name: Ruth Veloz (Ruth Veloz)  /Age: 1950/73 y.o.     In-Person       Chief Complaint: left hip pain    HPI  Pt is a 73 year old female with left hip osteoarthritis. Pt stated over the past year she has had worsening left hip pain. Pt denies trauma or injury to her left hip. Pt tried cortisone injections and physical therapy with no improvement from the pain. Pt describes the pain as an aching sharp pain that radiates from her left hip to her left ankle. The left hip pain is aggravated by walking and ROM of left hip. Pt has been taking Tylenol that has helped improve the pain. Pt denies numbness and tingling in her left foot. Pt denies falls.    Past Medical History:   Diagnosis Date    A-fib (CMS/[facility])     GERD (gastroesophageal reflux disease)     Hip pain, acute, left     Osteopenia     Personal history of diseases of the blood and blood-forming organs and certain disorders involving the immune mechanism     History of autoimmune disorder    Personal history of other diseases of the digestive system     History of gastroesophageal reflux (GERD)       Past Surgical History:   Procedure Laterality Date    OTHER SURGICAL HISTORY  2021    Balloon sinuplasty    SMALL INTESTINE SURGERY      twisted bowel repair       Social History     Tobacco Use    Smoking status: Never    Smokeless tobacco: Never   Substance Use Topics    Alcohol use: Not Currently     Social History     Substance and Sexual Activity   Drug Use Never     Family History   Problem Relation Name Age of Onset    Other (leaky valve) Mother      Stroke Mother      Hypertension Mother      Kidney failure Father      Cushing syndrome Sister      No Known Problems Brother      No Known Problems Daughter       Current Outpatient Medications   Medication Sig Dispense Refill    cholecalciferol (Vitamin D-3) 50 MCG ( UT) tablet Take 2 tablets (4,000 Units) by mouth once daily.      denosumab  "(Prolia) 60 mg/mL syringe Inject under the skin.      Eliquis 5 mg tablet Take 1 tablet (5 mg) by mouth 2 times a day.      famotidine (Pepcid) 20 mg tablet Take 1 tablet (20 mg) by mouth 2 times a day. As needed      fluticasone (Flonase) 50 mcg/actuation nasal spray Administer 1-2 sprays into each nostril once daily.      gabapentin (Neurontin) 100 mg capsule Take 2 capsules (200 mg) by mouth once daily at bedtime.      metoprolol tartrate (Lopressor) 25 mg tablet Take 1 tablet (25 mg) by mouth 2 times a day as needed.      sotalol (Betapace) 80 mg tablet Take by mouth. one tab in the am and 1/2 at night Orally       No current facility-administered medications for this visit.       Allergies   Allergen Reactions    Amoxicillin Hives    Amoxicillin-Pot Clavulanate Hives    Flunisolide Hives    Penicillin Hives    Sulfa (Sulfonamide Antibiotics) Unknown     Review of Systems   Constitutional: Negative.    HENT: Negative.     Eyes: Negative.    Respiratory:          Mild SOB on exertion   Cardiovascular:         Irregular heart rhythm   Gastrointestinal: Negative.    Genitourinary: Negative.    Musculoskeletal:         See HPI   Skin: Negative.    Neurological: Negative.    Psychiatric/Behavioral: Negative.         /83   Pulse 106   Temp 36 °C (96.8 °F) (Temporal)   Ht 1.549 m (5' 1\")   Wt 58 kg (127 lb 13.9 oz)   SpO2 98%   BMI 24.16 kg/m²       Physical Exam  Vitals reviewed.   Constitutional:       Appearance: Normal appearance.   HENT:      Head: Normocephalic and atraumatic.      Nose: Nose normal.   Cardiovascular:      Rate and Rhythm: Normal rate. Rhythm irregular.      Pulses: Normal pulses.      Heart sounds: Murmur heard.   Pulmonary:      Effort: Pulmonary effort is normal.      Breath sounds: Normal breath sounds.   Abdominal:      General: Bowel sounds are normal.      Palpations: Abdomen is soft.   Musculoskeletal:      Cervical back: Normal range of motion.      Comments: Left hip pain " with ROM   Skin:     General: Skin is warm and dry.   Neurological:      General: No focal deficit present.      Mental Status: She is alert and oriented to person, place, and time.   Psychiatric:         Mood and Affect: Mood normal.         Behavior: Behavior normal.         Thought Content: Thought content normal.        PAT AIRWAY:   Airway:     Mallampati::  II    TM distance::  >3 FB    Neck ROM::  Full    DASI: 15.2  METS: 4.6  CHADS:1.9%  RCRI: 0.4%  STOP BAN    Assessment and Plan:     Primary Osteoarthritis of left hip: hip replacement total uncement unilat Depuy implants  Atrial fibrillation: Pt is followed by Dr. Watts; Pt is taking Eliquis, Metoprolol tartrate, and sotalol.  Aortic insufficiency: followed by Dr Watts; Cardiac clearance completed by Dr Watts; last ECHO: 2022 : The left ventricular chamber size is mildly dilated. There is mildly decreased left ventricular systolic function. Estimated ejection fraction is 50-54%. The left atrial size is mild to moderately dilated. Right atrial cavity size is mildly dilated. Mild to moderate aortic regurgitation. Mild to moderate mitral regurgitation.    Ginny Han, APRN-CNP

## 2023-12-29 LAB — STAPHYLOCOCCUS SPEC CULT: NORMAL

## 2024-01-02 ENCOUNTER — TELEPHONE (OUTPATIENT)
Dept: CARDIOLOGY | Facility: CLINIC | Age: 74
End: 2024-01-02
Payer: MEDICARE

## 2024-01-03 ENCOUNTER — APPOINTMENT (OUTPATIENT)
Dept: CARDIOLOGY | Facility: CLINIC | Age: 74
End: 2024-01-03
Payer: MEDICARE

## 2024-01-03 ENCOUNTER — APPOINTMENT (OUTPATIENT)
Dept: PREADMISSION TESTING | Facility: HOSPITAL | Age: 74
End: 2024-01-03
Payer: MEDICARE

## 2024-01-08 ENCOUNTER — APPOINTMENT (OUTPATIENT)
Dept: CARDIOLOGY | Facility: CLINIC | Age: 74
End: 2024-01-08
Payer: MEDICARE

## 2024-01-10 ENCOUNTER — ANESTHESIA EVENT (OUTPATIENT)
Dept: OPERATING ROOM | Facility: HOSPITAL | Age: 74
DRG: 470 | End: 2024-01-10
Payer: MEDICARE

## 2024-01-10 ENCOUNTER — APPOINTMENT (OUTPATIENT)
Dept: RADIOLOGY | Facility: HOSPITAL | Age: 74
DRG: 470 | End: 2024-01-10
Payer: MEDICARE

## 2024-01-10 ENCOUNTER — HOSPITAL ENCOUNTER (INPATIENT)
Facility: HOSPITAL | Age: 74
LOS: 3 days | Discharge: HOME HEALTH | DRG: 470 | End: 2024-01-16
Attending: ORTHOPAEDIC SURGERY | Admitting: ORTHOPAEDIC SURGERY
Payer: MEDICARE

## 2024-01-10 ENCOUNTER — ANESTHESIA (OUTPATIENT)
Dept: OPERATING ROOM | Facility: HOSPITAL | Age: 74
DRG: 470 | End: 2024-01-10
Payer: MEDICARE

## 2024-01-10 DIAGNOSIS — M16.12 PRIMARY OSTEOARTHRITIS OF LEFT HIP: ICD-10-CM

## 2024-01-10 DIAGNOSIS — Z96.649 DISLOCATION OF HIP JOINT PROSTHESIS, INITIAL ENCOUNTER (CMS-HCC): Primary | ICD-10-CM

## 2024-01-10 DIAGNOSIS — T84.029A DISLOCATION OF HIP JOINT PROSTHESIS, INITIAL ENCOUNTER (CMS-HCC): Primary | ICD-10-CM

## 2024-01-10 PROBLEM — I34.0 MITRAL REGURGITATION: Status: ACTIVE | Noted: 2024-01-10

## 2024-01-10 LAB
ABO GROUP (TYPE) IN BLOOD: NORMAL
RH FACTOR (ANTIGEN D): NORMAL

## 2024-01-10 PROCEDURE — 3700000002 HC GENERAL ANESTHESIA TIME - EACH INCREMENTAL 1 MINUTE: Performed by: ORTHOPAEDIC SURGERY

## 2024-01-10 PROCEDURE — 94760 N-INVAS EAR/PLS OXIMETRY 1: CPT

## 2024-01-10 PROCEDURE — 2500000004 HC RX 250 GENERAL PHARMACY W/ HCPCS (ALT 636 FOR OP/ED): Performed by: INTERNAL MEDICINE

## 2024-01-10 PROCEDURE — C1713 ANCHOR/SCREW BN/BN,TIS/BN: HCPCS | Performed by: ORTHOPAEDIC SURGERY

## 2024-01-10 PROCEDURE — C1776 JOINT DEVICE (IMPLANTABLE): HCPCS | Performed by: ORTHOPAEDIC SURGERY

## 2024-01-10 PROCEDURE — G0378 HOSPITAL OBSERVATION PER HR: HCPCS

## 2024-01-10 PROCEDURE — 2500000004 HC RX 250 GENERAL PHARMACY W/ HCPCS (ALT 636 FOR OP/ED): Performed by: ORTHOPAEDIC SURGERY

## 2024-01-10 PROCEDURE — 2500000005 HC RX 250 GENERAL PHARMACY W/O HCPCS: Performed by: ANESTHESIOLOGIST ASSISTANT

## 2024-01-10 PROCEDURE — 99100 ANES PT EXTEME AGE<1 YR&>70: CPT | Performed by: STUDENT IN AN ORGANIZED HEALTH CARE EDUCATION/TRAINING PROGRAM

## 2024-01-10 PROCEDURE — 94761 N-INVAS EAR/PLS OXIMETRY MLT: CPT

## 2024-01-10 PROCEDURE — 7100000011 HC EXTENDED STAY RECOVERY HOURLY - NURSING UNIT

## 2024-01-10 PROCEDURE — 2720000007 HC OR 272 NO HCPCS: Performed by: ORTHOPAEDIC SURGERY

## 2024-01-10 PROCEDURE — 2500000005 HC RX 250 GENERAL PHARMACY W/O HCPCS: Performed by: STUDENT IN AN ORGANIZED HEALTH CARE EDUCATION/TRAINING PROGRAM

## 2024-01-10 PROCEDURE — A4649 SURGICAL SUPPLIES: HCPCS | Performed by: ORTHOPAEDIC SURGERY

## 2024-01-10 PROCEDURE — 73501 X-RAY EXAM HIP UNI 1 VIEW: CPT | Mod: LT

## 2024-01-10 PROCEDURE — 0SRB02A REPLACEMENT OF LEFT HIP JOINT WITH METAL ON POLYETHYLENE SYNTHETIC SUBSTITUTE, UNCEMENTED, OPEN APPROACH: ICD-10-PCS | Performed by: ORTHOPAEDIC SURGERY

## 2024-01-10 PROCEDURE — 2500000004 HC RX 250 GENERAL PHARMACY W/ HCPCS (ALT 636 FOR OP/ED): Performed by: PHYSICIAN ASSISTANT

## 2024-01-10 PROCEDURE — 2500000005 HC RX 250 GENERAL PHARMACY W/O HCPCS: Performed by: ORTHOPAEDIC SURGERY

## 2024-01-10 PROCEDURE — A27130 PR TOTAL HIP ARTHROPLASTY: Performed by: STUDENT IN AN ORGANIZED HEALTH CARE EDUCATION/TRAINING PROGRAM

## 2024-01-10 PROCEDURE — 3600000010 HC OR TIME - EACH INCREMENTAL 1 MINUTE - PROCEDURE LEVEL FIVE: Performed by: ORTHOPAEDIC SURGERY

## 2024-01-10 PROCEDURE — 2500000005 HC RX 250 GENERAL PHARMACY W/O HCPCS: Performed by: PHYSICIAN ASSISTANT

## 2024-01-10 PROCEDURE — 7100000001 HC RECOVERY ROOM TIME - INITIAL BASE CHARGE: Performed by: ORTHOPAEDIC SURGERY

## 2024-01-10 PROCEDURE — 2500000004 HC RX 250 GENERAL PHARMACY W/ HCPCS (ALT 636 FOR OP/ED): Performed by: ANESTHESIOLOGY

## 2024-01-10 PROCEDURE — 27130 TOTAL HIP ARTHROPLASTY: CPT | Performed by: ORTHOPAEDIC SURGERY

## 2024-01-10 PROCEDURE — 2780000003 HC OR 278 NO HCPCS: Performed by: ORTHOPAEDIC SURGERY

## 2024-01-10 PROCEDURE — 2500000004 HC RX 250 GENERAL PHARMACY W/ HCPCS (ALT 636 FOR OP/ED): Performed by: STUDENT IN AN ORGANIZED HEALTH CARE EDUCATION/TRAINING PROGRAM

## 2024-01-10 PROCEDURE — A27130 PR TOTAL HIP ARTHROPLASTY: Performed by: ANESTHESIOLOGIST ASSISTANT

## 2024-01-10 PROCEDURE — 2500000001 HC RX 250 WO HCPCS SELF ADMINISTERED DRUGS (ALT 637 FOR MEDICARE OP): Performed by: PHYSICIAN ASSISTANT

## 2024-01-10 PROCEDURE — 3700000001 HC GENERAL ANESTHESIA TIME - INITIAL BASE CHARGE: Performed by: ORTHOPAEDIC SURGERY

## 2024-01-10 PROCEDURE — 36415 COLL VENOUS BLD VENIPUNCTURE: CPT | Performed by: ORTHOPAEDIC SURGERY

## 2024-01-10 PROCEDURE — 7100000002 HC RECOVERY ROOM TIME - EACH INCREMENTAL 1 MINUTE: Performed by: ORTHOPAEDIC SURGERY

## 2024-01-10 PROCEDURE — 27130 TOTAL HIP ARTHROPLASTY: CPT | Performed by: PHYSICIAN ASSISTANT

## 2024-01-10 PROCEDURE — 2500000004 HC RX 250 GENERAL PHARMACY W/ HCPCS (ALT 636 FOR OP/ED): Performed by: ANESTHESIOLOGIST ASSISTANT

## 2024-01-10 PROCEDURE — 3600000005 HC OR TIME - INITIAL BASE CHARGE - PROCEDURE LEVEL FIVE: Performed by: ORTHOPAEDIC SURGERY

## 2024-01-10 DEVICE — PINNACLE GRIPTION ACETABULAR SHELL MULTI-HOLE 52MM OD
Type: IMPLANTABLE DEVICE | Site: HIP | Status: FUNCTIONAL
Brand: PINNACLE GRIPTION

## 2024-01-10 DEVICE — PINNACLE CANCELLOUS BONE SCREW 6.5MM X 30MM
Type: IMPLANTABLE DEVICE | Site: HIP | Status: FUNCTIONAL
Brand: PINNACLE

## 2024-01-10 DEVICE — PINNACLE HIP SOLUTIONS ALTRX POLYETHYLENE ACETABULAR LINER +4 10 DEGREE 36MM ID 52MM OD
Type: IMPLANTABLE DEVICE | Site: HEEL | Status: FUNCTIONAL
Brand: PINNACLE ALTRX

## 2024-01-10 DEVICE — M-SPEC METAL FEMORAL HEAD 12/14 TAPER DIAMETER 36MM +8.5: Type: IMPLANTABLE DEVICE | Site: HIP | Status: FUNCTIONAL

## 2024-01-10 DEVICE — CORAIL HIP SYSTEM CEMENTLESS FEMORAL STEM 12/14 AMT 135 DEGREES KA SIZE 11 HA COATED STANDARD COLLAR
Type: IMPLANTABLE DEVICE | Site: HIP | Status: FUNCTIONAL
Brand: CORAIL

## 2024-01-10 DEVICE — PINNACLE CANCELLOUS BONE SCREW 6.5MM X 15MM
Type: IMPLANTABLE DEVICE | Site: HIP | Status: FUNCTIONAL
Brand: PINNACLE

## 2024-01-10 RX ORDER — FAMOTIDINE 20 MG/1
20 TABLET, FILM COATED ORAL 2 TIMES DAILY
Status: DISCONTINUED | OUTPATIENT
Start: 2024-01-10 | End: 2024-01-16 | Stop reason: HOSPADM

## 2024-01-10 RX ORDER — FENTANYL CITRATE 50 UG/ML
25 INJECTION, SOLUTION INTRAMUSCULAR; INTRAVENOUS EVERY 5 MIN PRN
Status: DISCONTINUED | OUTPATIENT
Start: 2024-01-10 | End: 2024-01-10 | Stop reason: HOSPADM

## 2024-01-10 RX ORDER — NALOXONE HYDROCHLORIDE 0.4 MG/ML
0.2 INJECTION, SOLUTION INTRAMUSCULAR; INTRAVENOUS; SUBCUTANEOUS EVERY 5 MIN PRN
Status: DISCONTINUED | OUTPATIENT
Start: 2024-01-10 | End: 2024-01-13

## 2024-01-10 RX ORDER — KETOROLAC TROMETHAMINE 30 MG/ML
15 INJECTION, SOLUTION INTRAMUSCULAR; INTRAVENOUS ONCE
Status: COMPLETED | OUTPATIENT
Start: 2024-01-10 | End: 2024-01-10

## 2024-01-10 RX ORDER — NALOXONE HYDROCHLORIDE 0.4 MG/ML
0.2 INJECTION, SOLUTION INTRAMUSCULAR; INTRAVENOUS; SUBCUTANEOUS EVERY 5 MIN PRN
Status: DISCONTINUED | OUTPATIENT
Start: 2024-01-10 | End: 2024-01-10 | Stop reason: SDUPTHER

## 2024-01-10 RX ORDER — VANCOMYCIN 1 G/200ML
1 INJECTION, SOLUTION INTRAVENOUS EVERY 12 HOURS
Status: COMPLETED | OUTPATIENT
Start: 2024-01-11 | End: 2024-01-11

## 2024-01-10 RX ORDER — SOTALOL HYDROCHLORIDE 80 MG/1
80 TABLET ORAL DAILY
Status: DISCONTINUED | OUTPATIENT
Start: 2024-01-10 | End: 2024-01-11

## 2024-01-10 RX ORDER — NALOXONE HYDROCHLORIDE 0.4 MG/ML
0.2 INJECTION, SOLUTION INTRAMUSCULAR; INTRAVENOUS; SUBCUTANEOUS EVERY 5 MIN PRN
Status: DISCONTINUED | OUTPATIENT
Start: 2024-01-10 | End: 2024-01-16 | Stop reason: HOSPADM

## 2024-01-10 RX ORDER — PROPOFOL 10 MG/ML
INJECTION, EMULSION INTRAVENOUS AS NEEDED
Status: DISCONTINUED | OUTPATIENT
Start: 2024-01-10 | End: 2024-01-10

## 2024-01-10 RX ORDER — SODIUM CHLORIDE 9 MG/ML
100 INJECTION, SOLUTION INTRAVENOUS CONTINUOUS
Status: DISCONTINUED | OUTPATIENT
Start: 2024-01-10 | End: 2024-01-11

## 2024-01-10 RX ORDER — SODIUM CHLORIDE, SODIUM LACTATE, POTASSIUM CHLORIDE, CALCIUM CHLORIDE 600; 310; 30; 20 MG/100ML; MG/100ML; MG/100ML; MG/100ML
INJECTION, SOLUTION INTRAVENOUS CONTINUOUS PRN
Status: DISCONTINUED | OUTPATIENT
Start: 2024-01-10 | End: 2024-01-10

## 2024-01-10 RX ORDER — ONDANSETRON HYDROCHLORIDE 2 MG/ML
4 INJECTION, SOLUTION INTRAVENOUS ONCE AS NEEDED
Status: DISCONTINUED | OUTPATIENT
Start: 2024-01-10 | End: 2024-01-10 | Stop reason: HOSPADM

## 2024-01-10 RX ORDER — POLYETHYLENE GLYCOL 3350 17 G/17G
17 POWDER, FOR SOLUTION ORAL DAILY
Status: DISCONTINUED | OUTPATIENT
Start: 2024-01-10 | End: 2024-01-16 | Stop reason: HOSPADM

## 2024-01-10 RX ORDER — METOPROLOL TARTRATE 1 MG/ML
INJECTION, SOLUTION INTRAVENOUS AS NEEDED
Status: DISCONTINUED | OUTPATIENT
Start: 2024-01-10 | End: 2024-01-10

## 2024-01-10 RX ORDER — SOTALOL HYDROCHLORIDE 80 MG/1
40 TABLET ORAL NIGHTLY
Status: DISCONTINUED | OUTPATIENT
Start: 2024-01-11 | End: 2024-01-13

## 2024-01-10 RX ORDER — METOPROLOL TARTRATE 25 MG/1
25 TABLET, FILM COATED ORAL 2 TIMES DAILY
Status: DISCONTINUED | OUTPATIENT
Start: 2024-01-10 | End: 2024-01-11

## 2024-01-10 RX ORDER — SOTALOL HYDROCHLORIDE 80 MG/1
80 TABLET ORAL DAILY
Status: DISCONTINUED | OUTPATIENT
Start: 2024-01-11 | End: 2024-01-13

## 2024-01-10 RX ORDER — KETAMINE HYDROCHLORIDE 50 MG/ML
INJECTION, SOLUTION INTRAMUSCULAR; INTRAVENOUS AS NEEDED
Status: DISCONTINUED | OUTPATIENT
Start: 2024-01-10 | End: 2024-01-10

## 2024-01-10 RX ORDER — MORPHINE SULFATE 2 MG/ML
2 INJECTION, SOLUTION INTRAMUSCULAR; INTRAVENOUS EVERY 2 HOUR PRN
Status: DISCONTINUED | OUTPATIENT
Start: 2024-01-10 | End: 2024-01-16 | Stop reason: HOSPADM

## 2024-01-10 RX ORDER — FENTANYL CITRATE 50 UG/ML
INJECTION, SOLUTION INTRAMUSCULAR; INTRAVENOUS AS NEEDED
Status: DISCONTINUED | OUTPATIENT
Start: 2024-01-10 | End: 2024-01-10

## 2024-01-10 RX ORDER — LIDOCAINE HYDROCHLORIDE 10 MG/ML
INJECTION INFILTRATION; PERINEURAL AS NEEDED
Status: DISCONTINUED | OUTPATIENT
Start: 2024-01-10 | End: 2024-01-10

## 2024-01-10 RX ORDER — VANCOMYCIN 1 G/200ML
1000 INJECTION, SOLUTION INTRAVENOUS ONCE
Status: COMPLETED | OUTPATIENT
Start: 2024-01-10 | End: 2024-01-10

## 2024-01-10 RX ORDER — OXYCODONE AND ACETAMINOPHEN 5; 325 MG/1; MG/1
1 TABLET ORAL EVERY 4 HOURS PRN
Status: DISCONTINUED | OUTPATIENT
Start: 2024-01-10 | End: 2024-01-16 | Stop reason: HOSPADM

## 2024-01-10 RX ORDER — GABAPENTIN 100 MG/1
200 CAPSULE ORAL NIGHTLY
Status: DISCONTINUED | OUTPATIENT
Start: 2024-01-10 | End: 2024-01-16 | Stop reason: HOSPADM

## 2024-01-10 RX ORDER — MAGNESIUM SULFATE 1 G/100ML
INJECTION INTRAVENOUS AS NEEDED
Status: DISCONTINUED | OUTPATIENT
Start: 2024-01-10 | End: 2024-01-10

## 2024-01-10 RX ORDER — SODIUM CHLORIDE, SODIUM LACTATE, POTASSIUM CHLORIDE, CALCIUM CHLORIDE 600; 310; 30; 20 MG/100ML; MG/100ML; MG/100ML; MG/100ML
100 INJECTION, SOLUTION INTRAVENOUS CONTINUOUS
Status: DISCONTINUED | OUTPATIENT
Start: 2024-01-10 | End: 2024-01-10 | Stop reason: HOSPADM

## 2024-01-10 RX ORDER — ONDANSETRON HYDROCHLORIDE 2 MG/ML
4 INJECTION, SOLUTION INTRAVENOUS EVERY 6 HOURS PRN
Status: DISCONTINUED | OUTPATIENT
Start: 2024-01-10 | End: 2024-01-13

## 2024-01-10 RX ORDER — ALBUTEROL SULFATE 0.83 MG/ML
2.5 SOLUTION RESPIRATORY (INHALATION) ONCE AS NEEDED
Status: DISCONTINUED | OUTPATIENT
Start: 2024-01-10 | End: 2024-01-10 | Stop reason: HOSPADM

## 2024-01-10 RX ORDER — ROCURONIUM BROMIDE 10 MG/ML
INJECTION, SOLUTION INTRAVENOUS AS NEEDED
Status: DISCONTINUED | OUTPATIENT
Start: 2024-01-10 | End: 2024-01-10

## 2024-01-10 RX ORDER — TRANEXAMIC ACID 100 MG/ML
INJECTION, SOLUTION INTRAVENOUS AS NEEDED
Status: DISCONTINUED | OUTPATIENT
Start: 2024-01-10 | End: 2024-01-10 | Stop reason: HOSPADM

## 2024-01-10 RX ORDER — ACETAMINOPHEN 325 MG/1
650 TABLET ORAL EVERY 4 HOURS PRN
Status: DISCONTINUED | OUTPATIENT
Start: 2024-01-10 | End: 2024-01-16 | Stop reason: HOSPADM

## 2024-01-10 RX ORDER — METOPROLOL TARTRATE 25 MG/1
25 TABLET, FILM COATED ORAL 2 TIMES DAILY
Status: DISCONTINUED | OUTPATIENT
Start: 2024-01-10 | End: 2024-01-10 | Stop reason: ALTCHOICE

## 2024-01-10 RX ORDER — VANCOMYCIN HYDROCHLORIDE 1 G/20ML
INJECTION, POWDER, LYOPHILIZED, FOR SOLUTION INTRAVENOUS AS NEEDED
Status: DISCONTINUED | OUTPATIENT
Start: 2024-01-10 | End: 2024-01-10 | Stop reason: HOSPADM

## 2024-01-10 RX ADMIN — MAGNESIUM SULFATE IN DEXTROSE 1 G: 10 INJECTION, SOLUTION INTRAVENOUS at 13:30

## 2024-01-10 RX ADMIN — ROCURONIUM BROMIDE 40 MG: 10 INJECTION, SOLUTION INTRAVENOUS at 13:23

## 2024-01-10 RX ADMIN — ONDANSETRON 4 MG: 2 INJECTION INTRAMUSCULAR; INTRAVENOUS at 19:41

## 2024-01-10 RX ADMIN — HYDROMORPHONE HYDROCHLORIDE 0.3 MG: 1 INJECTION, SOLUTION INTRAMUSCULAR; INTRAVENOUS; SUBCUTANEOUS at 16:00

## 2024-01-10 RX ADMIN — PROPOFOL 130 MG: 10 INJECTION, EMULSION INTRAVENOUS at 13:23

## 2024-01-10 RX ADMIN — VANCOMYCIN 1000 MG: 1 INJECTION, SOLUTION INTRAVENOUS at 12:59

## 2024-01-10 RX ADMIN — HYDROMORPHONE HYDROCHLORIDE 0.3 MG: 1 INJECTION, SOLUTION INTRAMUSCULAR; INTRAVENOUS; SUBCUTANEOUS at 16:23

## 2024-01-10 RX ADMIN — SODIUM CHLORIDE, POTASSIUM CHLORIDE, SODIUM LACTATE AND CALCIUM CHLORIDE: 600; 310; 30; 20 INJECTION, SOLUTION INTRAVENOUS at 13:16

## 2024-01-10 RX ADMIN — METOPROLOL TARTRATE 5 MG: 5 INJECTION, SOLUTION INTRAVENOUS at 16:21

## 2024-01-10 RX ADMIN — MORPHINE SULFATE 2 MG: 2 INJECTION, SOLUTION INTRAMUSCULAR; INTRAVENOUS at 19:14

## 2024-01-10 RX ADMIN — FENTANYL CITRATE 50 MCG: 50 INJECTION, SOLUTION INTRAMUSCULAR; INTRAVENOUS at 14:59

## 2024-01-10 RX ADMIN — SUGAMMADEX 200 MG: 100 INJECTION, SOLUTION INTRAVENOUS at 15:30

## 2024-01-10 RX ADMIN — HYDROMORPHONE HYDROCHLORIDE 0.3 MG: 1 INJECTION, SOLUTION INTRAMUSCULAR; INTRAVENOUS; SUBCUTANEOUS at 16:15

## 2024-01-10 RX ADMIN — FAMOTIDINE 20 MG: 20 TABLET ORAL at 20:41

## 2024-01-10 RX ADMIN — KETOROLAC TROMETHAMINE 15 MG: 30 INJECTION INTRAMUSCULAR; INTRAVENOUS at 16:40

## 2024-01-10 RX ADMIN — ROCURONIUM BROMIDE 10 MG: 10 INJECTION, SOLUTION INTRAVENOUS at 14:16

## 2024-01-10 RX ADMIN — Medication 2 L/MIN: at 18:15

## 2024-01-10 RX ADMIN — SODIUM CHLORIDE, POTASSIUM CHLORIDE, SODIUM LACTATE AND CALCIUM CHLORIDE: 600; 310; 30; 20 INJECTION, SOLUTION INTRAVENOUS at 15:18

## 2024-01-10 RX ADMIN — KETAMINE HYDROCHLORIDE 50 MG: 50 INJECTION, SOLUTION INTRAMUSCULAR; INTRAVENOUS at 13:46

## 2024-01-10 RX ADMIN — LIDOCAINE HYDROCHLORIDE 50 MG: 10 INJECTION, SOLUTION INFILTRATION; PERINEURAL at 13:23

## 2024-01-10 RX ADMIN — SODIUM CHLORIDE, POTASSIUM CHLORIDE, SODIUM LACTATE AND CALCIUM CHLORIDE: 600; 310; 30; 20 INJECTION, SOLUTION INTRAVENOUS at 13:11

## 2024-01-10 RX ADMIN — FENTANYL CITRATE 50 MCG: 50 INJECTION, SOLUTION INTRAMUSCULAR; INTRAVENOUS at 13:23

## 2024-01-10 RX ADMIN — HYDROMORPHONE HYDROCHLORIDE 0.3 MG: 1 INJECTION, SOLUTION INTRAMUSCULAR; INTRAVENOUS; SUBCUTANEOUS at 16:07

## 2024-01-10 RX ADMIN — GABAPENTIN 200 MG: 100 CAPSULE ORAL at 20:40

## 2024-01-10 SDOH — SOCIAL STABILITY: SOCIAL INSECURITY: HAVE YOU HAD THOUGHTS OF HARMING ANYONE ELSE?: NO

## 2024-01-10 SDOH — SOCIAL STABILITY: SOCIAL INSECURITY: ARE YOU OR HAVE YOU BEEN THREATENED OR ABUSED PHYSICALLY, EMOTIONALLY, OR SEXUALLY BY ANYONE?: NO

## 2024-01-10 SDOH — HEALTH STABILITY: MENTAL HEALTH: CURRENT SMOKER: 0

## 2024-01-10 SDOH — SOCIAL STABILITY: SOCIAL INSECURITY: DOES ANYONE TRY TO KEEP YOU FROM HAVING/CONTACTING OTHER FRIENDS OR DOING THINGS OUTSIDE YOUR HOME?: NO

## 2024-01-10 SDOH — SOCIAL STABILITY: SOCIAL INSECURITY: WERE YOU ABLE TO COMPLETE ALL THE BEHAVIORAL HEALTH SCREENINGS?: YES

## 2024-01-10 SDOH — SOCIAL STABILITY: SOCIAL INSECURITY: HAS ANYONE EVER THREATENED TO HURT YOUR FAMILY OR YOUR PETS?: NO

## 2024-01-10 SDOH — SOCIAL STABILITY: SOCIAL INSECURITY: DO YOU FEEL ANYONE HAS EXPLOITED OR TAKEN ADVANTAGE OF YOU FINANCIALLY OR OF YOUR PERSONAL PROPERTY?: NO

## 2024-01-10 SDOH — SOCIAL STABILITY: SOCIAL INSECURITY: DO YOU FEEL UNSAFE GOING BACK TO THE PLACE WHERE YOU ARE LIVING?: NO

## 2024-01-10 SDOH — SOCIAL STABILITY: SOCIAL INSECURITY: ARE THERE ANY APPARENT SIGNS OF INJURIES/BEHAVIORS THAT COULD BE RELATED TO ABUSE/NEGLECT?: NO

## 2024-01-10 SDOH — SOCIAL STABILITY: SOCIAL INSECURITY: ABUSE: ADULT

## 2024-01-10 ASSESSMENT — COGNITIVE AND FUNCTIONAL STATUS - GENERAL
DAILY ACTIVITIY SCORE: 12
HELP NEEDED FOR BATHING: A LOT
TOILETING: A LITTLE
DRESSING REGULAR LOWER BODY CLOTHING: A LITTLE
CLIMB 3 TO 5 STEPS WITH RAILING: A LOT
TURNING FROM BACK TO SIDE WHILE IN FLAT BAD: A LOT
WALKING IN HOSPITAL ROOM: A LOT
PERSONAL GROOMING: A LOT
WALKING IN HOSPITAL ROOM: A LOT
PATIENT BASELINE BEDBOUND: NO
TOILETING: A LOT
DRESSING REGULAR LOWER BODY CLOTHING: A LOT
MOVING FROM LYING ON BACK TO SITTING ON SIDE OF FLAT BED WITH BEDRAILS: A LITTLE
TURNING FROM BACK TO SIDE WHILE IN FLAT BAD: A LITTLE
EATING MEALS: A LOT
MOVING TO AND FROM BED TO CHAIR: A LITTLE
STANDING UP FROM CHAIR USING ARMS: A LITTLE
HELP NEEDED FOR BATHING: A LITTLE
MOBILITY SCORE: 12
MOVING TO AND FROM BED TO CHAIR: A LOT
DAILY ACTIVITIY SCORE: 21
STANDING UP FROM CHAIR USING ARMS: A LOT
CLIMB 3 TO 5 STEPS WITH RAILING: A LOT
DRESSING REGULAR UPPER BODY CLOTHING: A LOT
MOBILITY SCORE: 16
MOVING FROM LYING ON BACK TO SITTING ON SIDE OF FLAT BED WITH BEDRAILS: A LOT

## 2024-01-10 ASSESSMENT — ACTIVITIES OF DAILY LIVING (ADL)
WALKS IN HOME: NEEDS ASSISTANCE
DRESSING YOURSELF: NEEDS ASSISTANCE
ADEQUATE_TO_COMPLETE_ADL: YES
JUDGMENT_ADEQUATE_SAFELY_COMPLETE_DAILY_ACTIVITIES: YES
HEARING - RIGHT EAR: FUNCTIONAL
TOILETING: NEEDS ASSISTANCE
BATHING: NEEDS ASSISTANCE
PATIENT'S MEMORY ADEQUATE TO SAFELY COMPLETE DAILY ACTIVITIES?: YES
LACK_OF_TRANSPORTATION: NO
ASSISTIVE_DEVICE: WALKER
GROOMING: INDEPENDENT
FEEDING YOURSELF: INDEPENDENT
HEARING - LEFT EAR: FUNCTIONAL

## 2024-01-10 ASSESSMENT — PATIENT HEALTH QUESTIONNAIRE - PHQ9
1. LITTLE INTEREST OR PLEASURE IN DOING THINGS: NOT AT ALL
2. FEELING DOWN, DEPRESSED OR HOPELESS: NOT AT ALL
SUM OF ALL RESPONSES TO PHQ9 QUESTIONS 1 & 2: 0

## 2024-01-10 ASSESSMENT — PAIN DESCRIPTION - LOCATION
LOCATION: HIP

## 2024-01-10 ASSESSMENT — PAIN SCALES - GENERAL
PAIN_LEVEL: 2
PAINLEVEL_OUTOF10: 8
PAINLEVEL_OUTOF10: 7
PAINLEVEL_OUTOF10: 7
PAINLEVEL_OUTOF10: 5 - MODERATE PAIN
PAINLEVEL_OUTOF10: 7
PAINLEVEL_OUTOF10: 0 - NO PAIN
PAINLEVEL_OUTOF10: 7
PAINLEVEL_OUTOF10: 2
PAINLEVEL_OUTOF10: 7
PAINLEVEL_OUTOF10: 6

## 2024-01-10 ASSESSMENT — PAIN DESCRIPTION - DESCRIPTORS: DESCRIPTORS: ACHING;DISCOMFORT;SORE

## 2024-01-10 ASSESSMENT — PAIN DESCRIPTION - ORIENTATION
ORIENTATION: LEFT

## 2024-01-10 ASSESSMENT — COLUMBIA-SUICIDE SEVERITY RATING SCALE - C-SSRS
6. HAVE YOU EVER DONE ANYTHING, STARTED TO DO ANYTHING, OR PREPARED TO DO ANYTHING TO END YOUR LIFE?: NO
1. IN THE PAST MONTH, HAVE YOU WISHED YOU WERE DEAD OR WISHED YOU COULD GO TO SLEEP AND NOT WAKE UP?: NO
2. HAVE YOU ACTUALLY HAD ANY THOUGHTS OF KILLING YOURSELF?: NO

## 2024-01-10 ASSESSMENT — PAIN - FUNCTIONAL ASSESSMENT
PAIN_FUNCTIONAL_ASSESSMENT: 0-10
PAIN_FUNCTIONAL_ASSESSMENT: CPOT (CRITICAL CARE PAIN OBSERVATION TOOL)

## 2024-01-10 ASSESSMENT — LIFESTYLE VARIABLES
HOW OFTEN DO YOU HAVE 6 OR MORE DRINKS ON ONE OCCASION: NEVER
SUBSTANCE_ABUSE_PAST_12_MONTHS: NO
AUDIT-C TOTAL SCORE: 0
HOW OFTEN DO YOU HAVE A DRINK CONTAINING ALCOHOL: NEVER
SKIP TO QUESTIONS 9-10: 1
AUDIT-C TOTAL SCORE: 0
HOW MANY STANDARD DRINKS CONTAINING ALCOHOL DO YOU HAVE ON A TYPICAL DAY: PATIENT DOES NOT DRINK
PRESCIPTION_ABUSE_PAST_12_MONTHS: NO

## 2024-01-10 NOTE — NURSING NOTE
Patient admitted to unit from PACU. Surgical dressing to left hip intact without drainage. Pt c/o nausea. Message sent to Dr. Medina requesting PRN for nausea. Pt provided with gingerale.

## 2024-01-10 NOTE — ANESTHESIA POSTPROCEDURE EVALUATION
Patient: Ruth Veloz    Procedure Summary       Date: 01/10/24 Room / Location: [facility] OR 12 / [facility]    Anesthesia Start: 1316 Anesthesia Stop: 1543    Procedure: Hip Replacement Total Uncement Unilat DePuy Implants (Left: Hip) Diagnosis:       Primary osteoarthritis of left hip      (Primary osteoarthritis of left hip [M16.12])    Surgeons: Tirso Medina MD Responsible Provider: Joshua Foote DO    Anesthesia Type: general ASA Status: 3            Anesthesia Type: general    Vitals Value Taken Time   /87 01/10/24 1544   Temp 36.2 01/10/24 1544   Pulse 111 01/10/24 1542   Resp 18 01/10/24 1544   SpO2 99 01/10/24 1544   Vitals shown include unvalidated device data.    Anesthesia Post Evaluation    Patient location during evaluation: PACU  Patient participation: complete - patient participated  Level of consciousness: awake and alert  Pain score: 2  Pain management: adequate  Airway patency: patent  Cardiovascular status: acceptable  Respiratory status: acceptable and face mask  Hydration status: acceptable  Postoperative Nausea and Vomiting: none  Comments: Pt given 5mg metoprolol for patient's tachycardia.  Pt has chronic afib, and has been mildly tachycardic throughout surgery.        No notable events documented.

## 2024-01-10 NOTE — ANESTHESIA PROCEDURE NOTES
Airway  Date/Time: 1/10/2024 1:24 PM  Urgency: elective      Staffing  Performed: [facility]   Authorized by: CYNDIE Russell    Performed by: CYNDIE Russell  Patient location during procedure: OR    Indications and Patient Condition  Indications for airway management: anesthesia  Preoxygenated: yes  Mask difficulty assessment: 1 - vent by mask    Final Airway Details  Final airway type: endotracheal airway      Successful airway: ETT  Cuffed: yes   Successful intubation technique: direct laryngoscopy  Facilitating devices/methods: intubating stylet  Endotracheal tube insertion site: oral  Blade: Kindra  Blade size: #3  ETT size (mm): 7.0  Cormack-Lehane Classification: grade I - full view of glottis  Placement verified by: chest auscultation   Measured from: lips  ETT to lips (cm): 22  Number of attempts at approach: 1

## 2024-01-10 NOTE — ANESTHESIA PREPROCEDURE EVALUATION
Patient: Ruth Veloz    Procedure Information       Date/Time: 01/10/24 1330    Procedure: Hip Replacement Total Uncement Unilat DePuy Implants (Left: Hip)    Location: [facility] OR 12 / [facility]    Surgeons: Tirso Medina MD            Relevant Problems   Cardiovascular   (+) Aortic valve disease   (+) Aortic valve regurgitation   (+) Mitral regurgitation   (+) Paroxysmal atrial fibrillation (CMS/HCC)   (+) Pure hypercholesterolemia      GI   (+) Gastroesophageal reflux disease      Hematology   (+) Medication induced coagulopathy (CMS/HCC)      Musculoskeletal   (+) Primary osteoarthritis of left hip   (+) Spinal stenosis of lumbar region     Past Surgical History:   Procedure Laterality Date   • OTHER SURGICAL HISTORY  12/13/2021    Balloon sinuplasty   • SMALL INTESTINE SURGERY  2001    twisted bowel repair         Clinical information reviewed:   Tobacco  Allergies  Meds  Problems  Med Hx  Surg Hx   Fam Hx  Soc   Hx        NPO Detail:  NPO/Void Status  Carbohydrate Drink Given Prior to Surgery? : N  Date of Last Liquid: 01/09/24  Time of Last Liquid: 0800  Date of Last Solid: 01/09/24  Time of Last Solid: 1700  Last Intake Type: Clear fluids  Time of Last Void: 1200         Physical Exam    Airway  Mallampati: II  TM distance: <3 FB  Neck ROM: limited     Cardiovascular    Dental    Pulmonary    Abdominal      Other findings: Very small chin, protuberant upper teeth, limited neck movement.      Anesthesia Plan    History of general anesthesia?: yes  History of complications of general anesthesia?: no    ASA 3     general   (GETA)  The patient is not a current smoker.  Education provided regarding risk of obstructive sleep apnea.  intravenous induction   Anesthetic plan and risks discussed with patient.    Plan discussed with [facility].

## 2024-01-10 NOTE — ADDENDUM NOTE
Addendum  created 01/10/24 1629 by Joshua Foote, DO    Clinical Note Signed, Intraprocedure Meds edited

## 2024-01-10 NOTE — OP NOTE
Hip Replacement Total [employer]plants (L) Operative Note     Date: 1/10/2024  OR Location: [facility]    Name: Ruth Veloz : 1950, Age: 73 y.o., MRN: [id number], Sex: female    Diagnosis  Pre-op Diagnosis     * Primary osteoarthritis of left hip [M16.12] Post-op Diagnosis     * Primary osteoarthritis of left hip [M16.12]     Procedures  Hip Replacement Total [employer] Implants  34737 - MT ARTHRP ACETBLR/PROX FEM PROSTC AGRFT/ALGRFT      Surgeons      * Tirso NELDA Medina - Primary    Resident/Fellow/Other Assistant:  Surgeon(s) and Role:    Procedure Summary  Anesthesia: Consult  ASA: III  Anesthesia Staff: Anesthesiologist: Marylou Evans MD; Joshua Foote DO  C-AA: CYNDIE Russell; CYNDIE Bragg  Estimated Blood Loss: 175 mL  Intra-op Medications:   Medication Name Total Dose   tranexamic acid (Cyklokapron) injection 1 g   vancomycin (Vancocin) vial for injection 1 g              Anesthesia Record               Intraprocedure I/O Totals          Intake    Magnesium Sulfate 0.00 mL    The total shown is the total volume documented since Anesthesia Start was filed.    Ketamine 0.00 mL    The total shown is the total volume documented since Anesthesia Start was filed.    LR infusion 1200.00 mL    Total Intake 1200 mL       Output    Est. Blood Loss 175 mL    Total Output 175 mL       Net    Net Volume 1025 mL          Specimen: No specimens collected     Staff:   Circulator: Bruna Severino RN; Chanda Brown RN; Chanda Novoa RN  Scrub Person: Eva Kenney; Jesu Watson         Drains and/or Catheters: * None in log *    Tourniquet Times:         Implants:  Implants       Type Name Action Serial No.      Joint Hip ACETABULAR CUP, MULTI HOLE, SIZE 52MM - [id number] Implanted      Screw SCREW CANCELLOUS 6.5 X 30 - [id number] Implanted      Screw SCREW CANCELL 6.5 X 15 - [id number] Implanted      Joint Hip LINER, ALTRX, +4, 10 DEGREE, 36 X 52MM -  [id number] Implanted      Joint Hip HIP STEM, CORMARY AMT COLLARER, SIZE-11 - [id number] Implanted      Joint Hip HIP BALL ARTIC/CARMEN 36+8.5 - [id number] Implanted               Findings:  see procedure details below    Indications: Ruth Veloz is an 73 y.o. female who is having surgery for Primary osteoarthritis of left hip [M16.12].  Options are discussed with the patient in detail. The patient states that this left hip pain is disabling and impairs the patient´s ability to do their activities of daily living. The patient has gait instability and is concerned regarding risk for further injury from this persistent left hip instability. The patient requests a discussion of further options including surgical options. On physical examination there is pain with and marked limitation of range of motions especially internal rotation abduction and adduction. There is an unstable gait and a Trendelenburg gait with the patient favoring the left hip. X-ray show severe osteoarthritis of the left hip with complete loss of joint surface cartilage, bone on bone, sclerosis and degenerative cysts. , Options are discussed with the patient in detail. Left total hip replacement with indications, alternatives, potential risks including but not lkimited to risk of infection, blood clot, blood loss, dislocation, nerve or blood vessel damage, stroke or death, benefits, unforseen risks, the rehab involved and the fact that no guarantee can be made were all discussed with the patient in detail. The patient understands, accepts and wishes to proceed because the left hip pain is disabling and impairs the patients ability to complete activities of daily living that are important to them and because of the multiple episodes of instability the patient wishes to proceed with left total hip replacement. The patient understands and agrees. I agree. We will be setting this up for a time that is convenient to the patient and as the schedule  allows , Please note that this report has been produced using speech recognition software. It may contain errors related to grammar, punctuation or spelling. Electronically signed, but not reviewed.modification and fall precautions and risk for further injury with falling or trauma and the use of a cane    The patient was seen in the preoperative area. The risks, benefits, complications, treatment options, non-operative alternatives, expected recovery and outcomes were  again discussed with the patient  in the presence of her . The possibilities of reaction to medication, pulmonary aspiration, injury to surrounding structures, bleeding, recurrent infection, the need for additional procedures, failure to diagnose a condition, and creating a complication requiring transfusion or operation were discussed with the patient. The patient  and her   concurred with the proposed plan, giving informed consent.  The site of surgery was properly noted/marked if necessary per policy. The patient has been actively warmed in preoperative area. Preoperative antibiotics have been ordered and given within 1 hours of incision. Venous thrombosis prophylaxis have been ordered including unilateral sequential compression device    Procedure Details:  the patient was taken to the operating room and was placed under [ General anesthesia without complications.  She was then placed in the lateral decubitus position with the  left hip up.  the entire left lower extremity and hip were widely prepped using triple prep of chlorhexidine, alcohol and ChloraPrep, and after allowing the prep to dry, was draped in the usual sterile manner. Time-out was done. I made a posterior lateral skin incision and carried down through the skin and subcutaneous tissue. Throughout the entire procedure, hemostasis was achieved using the Bovie. Prior to opening the fascia, I soaked the hip and Irrisept solution for 2 minutes and irrigated until cleared.  Fascia was then incised in line of its fibers. The fascia and bursa were reflected posteriorly and the piriformis tendon was identified, tagged for near its insertion at the greater trochanter, cut at its insertion at the greater trochanter, and allowed to retract posteriorly. The hip capsule was then incised and the hip was gently dislocated. Immediately I noted that there was severe are osteoarthritis arthritis of the  left hip with complete loss of joint surface cartilage of the femoral head and exposed areas of bone, deformity and spurring. Femoral neck osteotomy was made along the lines of the [place] cutting template. The exposure of the acetabulum was facilitated using carefully placed Hohmann retractors. All soft tissue was removed from the acetabulum using the Bovie and the capsule was removed from acetabulum. The acetabulum was then rearned with successive  reamers up to  51 millimeters which gave a good bleeding area. The acetabulum and the entire hip wound were then soaked in Irrisept solution for 3 more minutes and then irrigated until clear. The 52 millimeter outside diameter Brinson with gription acetabular multihole shell was then impacted into the acetabulum in anteversion and flexion. There was excellent seating and this was held further with one  30 millimeter x 6.5 millimeter cancellous bone screw  and 115 mm x 6.5 mm cancellous bone screw each placed in the posterior superior quadrant. The trial acetabular line was placed. This was a  36 millimeter inside diameter, 52 millimeter outside diameter. Femoral canal was then found with 1st the box osteotome and then a canal Finders on a T-handle by hand and then the femoral canal was broached with successive femoral broaches up to size  11 which completely filled the femoral canal. The trial femoral neck was placed with the trial heads, and I felt that there was the best stability and most equal leg length with standard neck and 36 millimeter by  8.5  offset trial femoral head. All trials were removed. The femoral canal and acetabulum was then again soaked in Irrisept solution for 3 minutes and irrigated until clear. The [place] ARTLX 36 millimeter inside diameter 52 millimeter outside diameter, polyethylene liner was then impacted into the acetabular shell with the 10 degree raised lip posteriorly and superiorly. There was excellent seating. The [place] cementless size 11 with standard  KA collar femoral stem was then impacted down the femoral canal in anteversion. There was excellent collar calcar contact. Again heads and I felt that there was the best stability, and proper leg length with the femoral the 36 millimeter 8.5 offset femoral trial . Trial was removed and the 36 millimeter 8.5 offset M-Spec metal femoral head was then impacted on to the femoral prosthesis. There was excellent seating. The hip was reduced and there was excellent stability in all planes of motion. Leg lengths were equal. After further soaking of the wound in  Irrisept solution and irrigation until clear, the wound was dried and 1 gram of vancomycin powder was placed in the hip wound. The piriformis tendon was reapproximated to the greater trochanter using interrupted #1 PDS. The fascia was closed using running #1 PDS, subcutaneous tissue was closed with running 2-0 Vicryl. Skin was closed. Sterile dressing was applied. Abduction pillow was placed between the patient's knees. The patient returned to the PACU in stable condition.   Complications:  None; patient tolerated the procedure well.    Disposition: PACU - hemodynamically stable.  Condition: stable         Additional Details:  none    Attending Attestation: I performed the procedure.    Tirso Medina  Phone Number: [phone]

## 2024-01-11 ENCOUNTER — APPOINTMENT (OUTPATIENT)
Dept: RADIOLOGY | Facility: HOSPITAL | Age: 74
DRG: 470 | End: 2024-01-11
Payer: MEDICARE

## 2024-01-11 ENCOUNTER — APPOINTMENT (OUTPATIENT)
Dept: ORTHOPEDIC SURGERY | Facility: CLINIC | Age: 74
End: 2024-01-11

## 2024-01-11 LAB
ANION GAP SERPL CALC-SCNC: 13 MMOL/L
APPEARANCE UR: CLEAR
BACTERIA #/AREA URNS AUTO: ABNORMAL /HPF
BILIRUB UR STRIP.AUTO-MCNC: NEGATIVE MG/DL
BUN SERPL-MCNC: 17 MG/DL (ref 8–25)
CALCIUM SERPL-MCNC: 8.7 MG/DL (ref 8.5–10.4)
CHLORIDE SERPL-SCNC: 103 MMOL/L (ref 97–107)
CO2 SERPL-SCNC: 21 MMOL/L (ref 24–31)
COLOR UR: ABNORMAL
CREAT SERPL-MCNC: 0.7 MG/DL (ref 0.4–1.6)
EGFRCR SERPLBLD CKD-EPI 2021: >90 ML/MIN/1.73M*2
ERYTHROCYTE [DISTWIDTH] IN BLOOD BY AUTOMATED COUNT: 13.7 % (ref 11.5–14.5)
GLUCOSE SERPL-MCNC: 91 MG/DL (ref 65–99)
GLUCOSE UR STRIP.AUTO-MCNC: NORMAL MG/DL
HCT VFR BLD AUTO: 38 % (ref 36–46)
HGB BLD-MCNC: 12 G/DL (ref 12–16)
HOLD SPECIMEN: NORMAL
HOLD SPECIMEN: NORMAL
HYALINE CASTS #/AREA URNS AUTO: ABNORMAL /LPF
KETONES UR STRIP.AUTO-MCNC: ABNORMAL MG/DL
LEUKOCYTE ESTERASE UR QL STRIP.AUTO: NEGATIVE
MCH RBC QN AUTO: 29.8 PG (ref 26–34)
MCHC RBC AUTO-ENTMCNC: 31.6 G/DL (ref 32–36)
MCV RBC AUTO: 94 FL (ref 80–100)
MUCOUS THREADS #/AREA URNS AUTO: ABNORMAL /LPF
NITRITE UR QL STRIP.AUTO: NEGATIVE
NRBC BLD-RTO: 0 /100 WBCS (ref 0–0)
PH UR STRIP.AUTO: 5 [PH]
PLATELET # BLD AUTO: 204 X10*3/UL (ref 150–450)
POTASSIUM SERPL-SCNC: 4 MMOL/L (ref 3.4–5.1)
PROT UR STRIP.AUTO-MCNC: ABNORMAL MG/DL
RBC # BLD AUTO: 4.03 X10*6/UL (ref 4–5.2)
RBC # UR STRIP.AUTO: NEGATIVE /UL
RBC #/AREA URNS AUTO: ABNORMAL /HPF
SODIUM SERPL-SCNC: 137 MMOL/L (ref 133–145)
SP GR UR STRIP.AUTO: 1.03
SQUAMOUS #/AREA URNS AUTO: ABNORMAL /HPF
UROBILINOGEN UR STRIP.AUTO-MCNC: NORMAL MG/DL
WBC # BLD AUTO: 8.2 X10*3/UL (ref 4.4–11.3)
WBC #/AREA URNS AUTO: ABNORMAL /HPF

## 2024-01-11 PROCEDURE — 97535 SELF CARE MNGMENT TRAINING: CPT | Mod: GO

## 2024-01-11 PROCEDURE — 85027 COMPLETE CBC AUTOMATED: CPT | Performed by: PHYSICIAN ASSISTANT

## 2024-01-11 PROCEDURE — 82374 ASSAY BLOOD CARBON DIOXIDE: CPT | Performed by: HOSPITALIST

## 2024-01-11 PROCEDURE — 97166 OT EVAL MOD COMPLEX 45 MIN: CPT | Mod: GO

## 2024-01-11 PROCEDURE — 97161 PT EVAL LOW COMPLEX 20 MIN: CPT | Mod: GP

## 2024-01-11 PROCEDURE — 81001 URINALYSIS AUTO W/SCOPE: CPT | Performed by: HOSPITALIST

## 2024-01-11 PROCEDURE — 97530 THERAPEUTIC ACTIVITIES: CPT | Mod: GP

## 2024-01-11 PROCEDURE — 51701 INSERT BLADDER CATHETER: CPT

## 2024-01-11 PROCEDURE — 2500000001 HC RX 250 WO HCPCS SELF ADMINISTERED DRUGS (ALT 637 FOR MEDICARE OP): Performed by: INTERNAL MEDICINE

## 2024-01-11 PROCEDURE — 97110 THERAPEUTIC EXERCISES: CPT | Mod: GP

## 2024-01-11 PROCEDURE — 97116 GAIT TRAINING THERAPY: CPT | Mod: GP

## 2024-01-11 PROCEDURE — 36415 COLL VENOUS BLD VENIPUNCTURE: CPT | Performed by: PHYSICIAN ASSISTANT

## 2024-01-11 PROCEDURE — 97530 THERAPEUTIC ACTIVITIES: CPT | Mod: GO

## 2024-01-11 PROCEDURE — 2500000004 HC RX 250 GENERAL PHARMACY W/ HCPCS (ALT 636 FOR OP/ED): Performed by: PHYSICIAN ASSISTANT

## 2024-01-11 PROCEDURE — 7100000011 HC EXTENDED STAY RECOVERY HOURLY - NURSING UNIT

## 2024-01-11 PROCEDURE — 2500000001 HC RX 250 WO HCPCS SELF ADMINISTERED DRUGS (ALT 637 FOR MEDICARE OP): Performed by: PHYSICIAN ASSISTANT

## 2024-01-11 PROCEDURE — 99222 1ST HOSP IP/OBS MODERATE 55: CPT | Performed by: NURSE PRACTITIONER

## 2024-01-11 PROCEDURE — 73501 X-RAY EXAM HIP UNI 1 VIEW: CPT | Mod: LT

## 2024-01-11 RX ORDER — METOPROLOL TARTRATE 25 MG/1
25 TABLET, FILM COATED ORAL 2 TIMES DAILY PRN
Status: DISCONTINUED | OUTPATIENT
Start: 2024-01-11 | End: 2024-01-16 | Stop reason: HOSPADM

## 2024-01-11 RX ADMIN — OXYCODONE AND ACETAMINOPHEN 1 TABLET: 325; 5 TABLET ORAL at 08:51

## 2024-01-11 RX ADMIN — GABAPENTIN 200 MG: 100 CAPSULE ORAL at 20:29

## 2024-01-11 RX ADMIN — SODIUM CHLORIDE 100 ML/HR: 900 INJECTION, SOLUTION INTRAVENOUS at 06:23

## 2024-01-11 RX ADMIN — MORPHINE SULFATE 2 MG: 2 INJECTION, SOLUTION INTRAMUSCULAR; INTRAVENOUS at 02:06

## 2024-01-11 RX ADMIN — APIXABAN 2.5 MG: 2.5 TABLET, FILM COATED ORAL at 08:51

## 2024-01-11 RX ADMIN — FAMOTIDINE 20 MG: 20 TABLET ORAL at 20:29

## 2024-01-11 RX ADMIN — SOTALOL HYDROCHLORIDE 80 MG: 80 TABLET ORAL at 08:51

## 2024-01-11 RX ADMIN — OXYCODONE AND ACETAMINOPHEN 1 TABLET: 325; 5 TABLET ORAL at 14:29

## 2024-01-11 RX ADMIN — POLYETHYLENE GLYCOL 3350 17 G: 17 POWDER, FOR SOLUTION ORAL at 08:51

## 2024-01-11 RX ADMIN — OXYCODONE AND ACETAMINOPHEN 1 TABLET: 325; 5 TABLET ORAL at 19:27

## 2024-01-11 RX ADMIN — SOTALOL HYDROCHLORIDE 40 MG: 80 TABLET ORAL at 20:28

## 2024-01-11 RX ADMIN — VANCOMYCIN 1 G: 1 INJECTION, SOLUTION INTRAVENOUS at 02:02

## 2024-01-11 RX ADMIN — FAMOTIDINE 20 MG: 20 TABLET ORAL at 08:51

## 2024-01-11 ASSESSMENT — PAIN - FUNCTIONAL ASSESSMENT
PAIN_FUNCTIONAL_ASSESSMENT: 0-10
PAIN_FUNCTIONAL_ASSESSMENT: FLACC (FACE, LEGS, ACTIVITY, CRY, CONSOLABILITY)
PAIN_FUNCTIONAL_ASSESSMENT: 0-10
PAIN_FUNCTIONAL_ASSESSMENT: FLACC (FACE, LEGS, ACTIVITY, CRY, CONSOLABILITY)
PAIN_FUNCTIONAL_ASSESSMENT: 0-10

## 2024-01-11 ASSESSMENT — PAIN SCALES - GENERAL
PAINLEVEL_OUTOF10: 7
PAINLEVEL_OUTOF10: 4
PAINLEVEL_OUTOF10: 5 - MODERATE PAIN
PAINLEVEL_OUTOF10: 6
PAINLEVEL_OUTOF10: 6
PAINLEVEL_OUTOF10: 9
PAINLEVEL_OUTOF10: 0 - NO PAIN
PAINLEVEL_OUTOF10: 4
PAINLEVEL_OUTOF10: 0 - NO PAIN

## 2024-01-11 ASSESSMENT — COGNITIVE AND FUNCTIONAL STATUS - GENERAL
TURNING FROM BACK TO SIDE WHILE IN FLAT BAD: A LOT
WALKING IN HOSPITAL ROOM: A LOT
MOBILITY SCORE: 12
DAILY ACTIVITIY SCORE: 12
MOVING FROM LYING ON BACK TO SITTING ON SIDE OF FLAT BED WITH BEDRAILS: A LOT
MOVING TO AND FROM BED TO CHAIR: A LOT
MOVING TO AND FROM BED TO CHAIR: A LOT
DRESSING REGULAR UPPER BODY CLOTHING: A LITTLE
TOILETING: A LOT
DRESSING REGULAR LOWER BODY CLOTHING: A LOT
TURNING FROM BACK TO SIDE WHILE IN FLAT BAD: A LOT
HELP NEEDED FOR BATHING: A LOT
CLIMB 3 TO 5 STEPS WITH RAILING: TOTAL
WALKING IN HOSPITAL ROOM: A LOT
TURNING FROM BACK TO SIDE WHILE IN FLAT BAD: A LOT
WALKING IN HOSPITAL ROOM: A LOT
EATING MEALS: A LOT
TOILETING: A LOT
MOVING FROM LYING ON BACK TO SITTING ON SIDE OF FLAT BED WITH BEDRAILS: A LOT
CLIMB 3 TO 5 STEPS WITH RAILING: TOTAL
MOBILITY SCORE: 11
STANDING UP FROM CHAIR USING ARMS: A LOT
STANDING UP FROM CHAIR USING ARMS: A LOT
PERSONAL GROOMING: A LOT
DRESSING REGULAR LOWER BODY CLOTHING: TOTAL
PERSONAL GROOMING: A LOT
HELP NEEDED FOR BATHING: A LOT
MOBILITY SCORE: 11
DRESSING REGULAR UPPER BODY CLOTHING: A LOT
DAILY ACTIVITIY SCORE: 14
MOVING FROM LYING ON BACK TO SITTING ON SIDE OF FLAT BED WITH BEDRAILS: A LOT
STANDING UP FROM CHAIR USING ARMS: A LOT
MOVING TO AND FROM BED TO CHAIR: A LOT
CLIMB 3 TO 5 STEPS WITH RAILING: A LOT

## 2024-01-11 ASSESSMENT — ACTIVITIES OF DAILY LIVING (ADL)
ADL_ASSISTANCE: INDEPENDENT
BATHING_ASSISTANCE: MODERATE
HOME_MANAGEMENT_TIME_ENTRY: 15

## 2024-01-11 NOTE — CONSULTS
Consults    Reason For Consult  Paroxysmal atrial fibrillation.    History Of Present Illness  Ruth Veloz is a 73 y.o. female presenting with left hip osteoarthritis.  Patient had an elective surgery by Dr. Miller earlier today.  She had an arthroplasty of the left hip.  Patient has a history of paroxysmal atrial fibrillation.  She is on sotalol for rhythm control and Eliquis for stroke prevention.  Patient saw her cardiologist, Dr. Watts 2 weeks ago and she was cleared for surgery.  The last dose of sotalol was on January 6.  Patient denies chest pain or shortness of breath.  She denies history of coronary artery disease.  Denies history stroke.  Denies history of diabetes.  She has hyperlipidemia as well.  Past Medical History  She has a past medical history of A-fib (CMS/HCC), GERD (gastroesophageal reflux disease), Hip pain, acute, left, Osteopenia, Personal history of diseases of the blood and blood-forming organs and certain disorders involving the immune mechanism, and Personal history of other diseases of the digestive system.  Paroxysmal atrial fibrillation.   Chronic sinusitis 09/17/2023    Allergic rhinitis 09/17/2023    Aortic valve disease 09/17/2023    Aortic valve regurgitation 09/17/2023    Bursitis of right shoulder 09/17/2023    Cervicalgia 09/17/2023    Gastroesophageal reflux disease 09/17/2023    Cervix prolapsed into vagina 09/17/2023    Medication induced coagulopathy (CMS/HCC) 09/17/2023    Menopausal symptom 09/17/2023    Nasal polyps 09/17/2023    Paroxysmal atrial fibrillation (CMS/HCC) 09/17/2023    Pure hypercholesterolemia 09/17/2023    Spinal stenosis of lumbar region 09/17/2023    Vitamin D deficiency 09/17/2023    Left hip pain 11/09/2023    Primary osteoarthritis of left hip 11/09/2023    Preop cardiovascular exam      Surgical History  She has a past surgical history that includes Other surgical history (12/13/2021) and Small intestine surgery (2001).  Left hip  arthroplasty January 10, 2024   Social History  She reports that she has never smoked. She has never used smokeless tobacco. She reports that she does not currently use alcohol. She reports that she does not use drugs.  Lives at home with her family.  Patient is planning to go home with home health care  Family History  Family History   Problem Relation Name Age of Onset    Other (leaky valve) Mother      Stroke Mother      Hypertension Mother      Kidney failure Father      Cushing syndrome Sister      No Known Problems Brother      No Known Problems Daughter          Allergies  Amoxicillin, Amoxicillin-pot clavulanate, Flunisolide, Penicillin, and Sulfa (sulfonamide antibiotics)    Review of Systems  10 system organ review was performed.  Patient denies history of coronary artery disease, she denies chest pain shortness of breath palpitations.  Patient denies dizziness lightheadedness focal neurological deficits.  Denies abdominal pain, nausea vomiting diarrhea, blood in her stool in her urine.  Denies lower extremity edema.  Denies history of diabetes, thyroid problems.  All other systems are negative except what mentioned in history of present illness     Physical Exam  Location: Room 409.  Pi is in no apparent distress.   Cooperative with exam.  Nontoxic-appearing.  Comfortable at rest on room air.  Skin is clean, dry.  No skin lesions, rashes, ecchymosis.  Head is atraumatic, normocephalic.  Mouth mucosa is pink and moist.  No mucosal lesions.  No nasal discharge.  Musculoskeletal: No deformities, no muscle swelling.  No point tenderness to palpation.  Neck is supple, no JVD, no carotid bruits.  No lymphadenopathy.  Chest is atraumatic.  No tenderness to palpation.  Lungs are clear to auscultation bilaterally.  No wheezes, no rales, no rhonchi.  Heart: Regular S1-S2.  Is a systolic murmur. Peripheral pulses are palpable in all extremities, equal.  Abdomen is soft, not tender, not distended.  Bowel sounds  positive in all quadrants.  No rebound, no guarding.  Full exam deferred.  Extremities: No peripheral edema, cords, cyanosis, varices.   Neuro: Patient is alert, oriented x3.  Moves all extremities.  No gross focal neurological deficits.  Face is symmetrical.  Tongue is midline.  No visual abnormalities.  No dysarthria or aphasia.  Psychiatric: Patient is cooperative with exam, maintains good eye contact.  No evidence of psychosis, suicidal ideation or depression.        Last Recorded Vitals  /68 (BP Location: Right arm, Patient Position: Lying)   Pulse 54   Temp 36.4 °C (97.5 °F) (Axillary)   Resp 14   SpO2 94%     Relevant Results       Assessment/Plan     Paroxysmal atrial fibrillation.  Continue sotalol and metoprolol.  Eliquis has been restarted by admitting service  DVT prophylaxis.  Arthroplasty of the left hip, management by orthopedic group  Dental spirometry, patient was courage to use every hour  Total time spent with patient today 37 minutes.    Adelina De La Rosa MD

## 2024-01-11 NOTE — CARE PLAN
The patient's goals for the shift include  work with therapy and urinate. Avoid cunningham cath    The clinical goals for the shift include Pain control, No nausea, adequate sleep

## 2024-01-11 NOTE — PROGRESS NOTES
Physical Therapy    Physical Therapy Treatment    Patient Name: Ruth Veloz  MRN: [id number]  Today's Date: 1/11/2024  Time Calculation  Start Time: 1515  Stop Time: 1545  Time Calculation (min): 30 min       Assessment/Plan   PT Assessment  PT Assessment Results: Decreased strength, Decreased range of motion, Decreased endurance, Impaired balance, Decreased mobility, Decreased safety awareness  Rehab Prognosis: Good  Evaluation/Treatment Tolerance: Patient limited by fatigue, Patient limited by pain  End of Session Communication: Bedside nurse, Physician (Telephone contact with Dr. Medina regarding patient's difficulty with supporting body weight with left LE and patient's difficulty with advancing left LE during treatment;  physician acknowledged information.)  Assessment Comment: Slow progress regarding functional mobility  End of Session Patient Position: Bed, 4 rail up  PT Plan  Inpatient/Swing Bed or Outpatient: Inpatient  PT Plan  Treatment/Interventions: Bed mobility, Transfer training, Gait training, Stair training, Balance training, Strengthening, Endurance training, Therapeutic exercise, Therapeutic activity, Home exercise program  PT Plan: Skilled PT  PT Frequency: BID  PT Discharge Recommendations: Moderate intensity level of continued care  PT Recommended Transfer Status: Assist x2  PT - OK to Discharge: Yes (with skilled physical therapy services at next level of care.)      General Visit Information:   PT  Visit  PT Received On: 01/11/24  General  Reason for Referral: Impaired mobility s/p left CORINNE 01/10/2024  Past Medical History Relevant to Rehab: AFIB, GERD, osteopenia, aortic valve disease, right shoulder bursitis, cerbalgia, spinal stenosis, small intestine surgery  Family/Caregiver Present: Yes  Co-Treatment: OT  Co-Treatment Reason: Initial evaluation  Prior to Session Communication: Bedside nurse  Patient Position Received: Bed, 4 rail up  General Comment: RN cleared patient for  treatment.  Patient reports agreeable to treatment.    Subjective   Precautions:  Precautions  LE Weight Bearing Status: Weight Bearing as Tolerated  Medical Precautions: Fall precautions  Post-Surgical Precautions: Left hip precautions  Vital Signs:       Objective   Pain:  Pain Assessment  Pain Assessment: 0-10  Pain Score: 7 (RN premedicated patient for pain.)  Pain Type: Surgical pain  Pain Location: Hip  Pain Orientation: Left  Cognition:  Cognition  Overall Cognitive Status: Within Functional Limits  Postural Control:  Static Sitting Balance  Static Sitting-Balance Support: Bilateral upper extremity supported  Static Sitting-Level of Assistance: Close supervision  Static Standing Balance  Static Standing-Balance Support: Bilateral upper extremity supported  Static Standing-Level of Assistance: Moderate assistance  Static Standing-Comment/Number of Minutes: Walker  Extremity/Trunk Assessments:  RLE   RLE : Within Functional Limits  LLE   LLE : Exceptions to WFL  Strength LLE  L Hip Flexion: 2-/5  L Knee Extension: 2/5  L Ankle Dorsiflexion: 3-/5  Activity Tolerance:  Activity Tolerance  Endurance: Decreased tolerance for upright activites  Activity Tolerance Comments: Fatigue and post op pain  Treatments:  Therapeutic Exercise  Therapeutic Exercise Performed: Yes  Therapeutic Exercise Activity 1: ankle pumps, quad set, glut set, assisted heel slide, assisted TKE, assisted hip abd (0-5 degrees) 5 reps x 1 set.              Bed Mobility  Bed Mobility: Yes  Bed Mobility 1  Bed Mobility 1: Supine to sitting  Level of Assistance 1: Moderate assistance  Bed Mobility Comments 1: Assist with left LE during supine to sit;  mod assist to scoot hips to edge of bed during supine to sit.  Bed Mobility 2  Bed Mobility  2: Sitting to supine  Level of Assistance 2: Maximum assistance  Bed Mobility Comments 2: Assist with trunk and bobby LE during sit to supine.    Transfers  Transfer: Yes  Transfer 1  Transfer From 1: Sit  to  Transfer to 1: Stand  Technique 1: Sit to stand  Transfer Device 1: Walker  Transfer Level of Assistance 1: Moderate tactile cues  Trials/Comments 1: x 2 reps;  assist with trunk support during sit to stand;  verbal cues for hand placement and left LE.  Transfers 2  Transfer From 2: Stand to  Transfer to 2: Sit  Technique 2: Stand to sit  Transfer Device 2: Walker  Transfer Level of Assistance 2: Moderate assistance  Trials/Comments 2: x 2 reps;  assist with trunk support and balance during stand to sit;  verbal cues for hand placement and left LE position.  Transfers 3  Transfer From 3: Bed to  Transfer to 3: Commode-standard  Technique 3: Stand pivot  Transfer Device 3: Walker  Transfer Level of Assistance 3: Maximum assistance  Trials/Comments 3: Assist with trunk support, balance, and walker positioning during stand pivot transfer;  patient had difficulty with supporting body weight with left LE;  patient had difficulty with advancing left LE during pivot portion of transfer.  Transfers 4  Transfer From 4: Commode-standard to  Transfer to 4: Bed  Technique 4: Stand pivot  Transfer Device 4: Walker  Transfer Level of Assistance 4: Maximum assistance  Trials/Comments 4: Assist with trunk support, balance, and walker positioning during stand pivot transfer;  patient had difficulty with supporting body weight with left LE;  patient had difficulty with advancing left LE during transfer.    Outcome Measures:  [facility] Basic Mobility  Turning from your back to your side while in a flat bed without using bedrails: A lot  Moving from lying on your back to sitting on the side of a flat bed without using bedrails: A lot  Moving to and from bed to chair (including a wheelchair): A lot  Standing up from a chair using your arms (e.g. wheelchair or bedside chair): A lot  To walk in hospital room: A lot  Climbing 3-5 steps with railing: Total  Basic Mobility - Total Score: 11    Education Documentation  Precautions, taught by  Guille Lerma, PT at 1/11/2024 12:56 PM.  Learner: Patient  Readiness: Acceptance  Method: Demonstration, Explanation  Response: Needs Reinforcement    Home Exercise Program, taught by Guille Lerma PT at 1/11/2024 12:56 PM.  Learner: Patient  Readiness: Acceptance  Method: Demonstration, Explanation  Response: Needs Reinforcement    Mobility Training, taught by Guille Lerma PT at 1/11/2024 12:56 PM.  Learner: Patient  Readiness: Acceptance  Method: Demonstration, Explanation  Response: Needs Reinforcement    Education Comments  No comments found.        OP EDUCATION:       Encounter Problems       Encounter Problems (Active)       Mobility       Bed mobility including supine to sit and sit to supine with supervision. (Progressing)       Start:  01/11/24    Expected End:  01/25/24            Ambulate 60 feet with rolling walker and supervision. (Progressing)       Start:  01/11/24    Expected End:  01/25/24            Negotiate 3 steps with single handrail +/- straight cane and min assist.       Start:  01/11/24    Expected End:  01/25/24               Pain - Adult          Transfers       Transfers including sit to stand and stand to sit with supervision. (Progressing)       Start:  01/11/24    Expected End:  01/25/24

## 2024-01-11 NOTE — PROGRESS NOTES
Occupational Therapy    Evaluation/Treatment    Patient Name: Ruth Veloz  MRN: [id number]  : 1950  Today's Date: 24  Time Calculation  Start Time: 1045  Stop Time: 1120  Time Calculation (min): 35 min       Assessment:  OT Assessment: Pt presents on eval with generalized weakness after surgery, LLE pain and new hip precautions, decreased activity tolerance, medical issues described, and impaired standing balance affecting self-care and functional transfers/mobility. Pt will benefit from continued skilled OT to address these deficits.  Prognosis: Fair  Evaluation/Treatment Tolerance: Patient limited by fatigue, Patient limited by pain, Treatment limited secondary to medical complications (Comment) (dizziness/lightheadedness)  End of Session Communication: Bedside nurse  End of Session Patient Position: Bed, 4 rail up (Needs in reach and spouse at bedside.)  OT Assessment Results: Decreased ADL status, Decreased endurance, Decreased functional mobility, Decreased IADLs    Plan:  Treatment Interventions: ADL retraining, Functional transfer training, UE strengthening/ROM, Endurance training, Patient/family training, Equipment evaluation/education, Neuromuscular reeducation, Compensatory technique education, Continued evaluation  OT Frequency: 5 times per week  OT Discharge Recommendations: Moderate intensity level of continued care  Equipment Recommended upon Discharge: Wheeled walker  OT Recommended Transfer Status: Moderate assist  OT - OK to Discharge: Yes      Subjective     General:   OT Received On: 24  General  Reason for Referral: s/p L THR, impaired ADL's  Referred By: Brigida Mckeon PA-C  Past Medical History Relevant to Rehab: AFIB, GERD, osteopenia, aortic valve disease, right shoulder bursitis, cerbalgia, spinal stenosis, small intestine surgery  Family/Caregiver Present: Yes (Spouse at bedside.)  Co-Treatment: PT  Co-Treatment Reason: Partial co-eval due to initial post op day  1 joint replacement  Prior to Session Communication: Bedside nurse, Physician (Skilled PT discussed with Dr. Medina results of post op Xray;  Dr. Medina cleared patient via Secure chat for skilled therapy evaluation with [facility].)  Patient Position Received: Bed, 4 rail up  General Comment: 73 year old female admit from home for left hip surgery.    Precautions:  Hearing/Visual Limitations: glasses  LE Weight Bearing Status: Weight Bearing as Tolerated  Medical Precautions: Fall precautions  Post-Surgical Precautions: Left hip precautions (Hip packet handout provided and both pt/spouse educated before functional mobility.)    Pain:  Pain Assessment  Pain Assessment: 0-10  Pain Score: 9  Pain Type: Acute pain, Surgical pain (with LLE exercise)  Pain Location: Hip  Pain Orientation: Left  Pain Interventions: Ambulation/increased activity    Objective     Cognition:  Overall Cognitive Status: Within Functional Limits  Orientation Level: Oriented X4    Home Living:  Type of Home: House  Lives With: Spouse  Home Adaptive Equipment: Walker rolling or standard  Home Layout: Two level  Home Access: Stairs to enter with rails  Entrance Stairs-Rails: Both  Entrance Stairs-Number of Steps: 3  Bathroom Shower/Tub: Tub/shower unit, Walk-in shower (walk-in shower upper level;  bathtub lower level)  Bathroom Toilet: Adaptive toilet seating  Bathroom Equipment: Grab bars in shower (suction cup type grab bars)    Prior Function:  Level of [place]: Independent with ADLs and functional transfers, Independent with homemaking with ambulation  ADL Assistance: Independent  Homemaking Assistance: Independent  Ambulatory Assistance: Independent  Vocational: Retired  Hand Dominance: Right  Prior Function Comments: +driving    ADL:  Eating Assistance: Independent  Grooming Assistance: Moderate  Grooming Deficit: Steadying, Verbal cueing, Standing with assistive device  Bathing Assistance: Moderate  UE Dressing Assistance: Stand by  UE  Dressing Deficit: Setup  LE Dressing Assistance: Total  LE Dressing Deficit:  (due to strict hip precautions and pt became dizzy/lightheaded)  Toileting Assistance with Device: Moderate  Toileting Deficit: Clothing management up, Clothing management down (pt able to complete pericare seated on bedside commode with setup after urinating)    Activity Tolerance:  Activity Tolerance Comments: impaired due to generalized weakness after surgery, LLE pain, and becoming dizzy/lightheaded    Bed Mobility/Transfers: Bed Mobility  Bed Mobility:  (Pt completed supine to sit in bed with min A for trunk up. Pt required max A for sit to supine in bed due to dizziness/lightheadedness.)    Transfers  Transfer:  (Pt completed sit<>stand from the bed and bedside commode with mod A required for balance/safety and due to increased LLE pain.)    Sitting Balance:  Static Sitting Balance  Static Sitting-Balance Support: Bilateral upper extremity supported  Static Sitting-Level of Assistance: Contact guard, Close supervision  Static Sitting-Comment/Number of Minutes: 3-4    Standing Balance:  Static Standing Balance  Static Standing-Balance Support: Bilateral upper extremity supported  Static Standing-Level of Assistance: Moderate assistance  Static Standing-Comment/Number of Minutes: 2-3    Therapy/Activity: Therapeutic Activity  Therapeutic Activity Performed:  (See toileting, bed mobility, transfers, static sitting balance, and static standing balance. Pt able to tolerate short functional mobility using a RW with mod A for balance. Educated pt/spouse on hip prec.)    Sensation:  Sensation Comment: BUE's WFL    Strength:  Strength Comments: BUE's WFL    Coordination:  Coordination Comment: BUE's WFL     Hand Function:  Hand Function  Gross Grasp: Functional  Coordination: Functional      Outcome Measures: [facility] Daily Activity  Putting on and taking off regular lower body clothing: Total  Bathing (including washing, rinsing, drying): A  lot  Putting on and taking off regular upper body clothing: A little  Toileting, which includes using toilet, bedpan or urinal: A lot  Taking care of personal grooming such as brushing teeth: A lot  Eating Meals: None  Daily Activity - Total Score: 14      Education Documentation  Handouts, taught by Bentley Pereira Jr., OT at 1/11/2024 12:46 PM.  Learner: Patient  Readiness: Acceptance  Method: Explanation, Demonstration, Handout  Response: Verbalizes Understanding    Precautions, taught by Bentley Pereira Jr. OT at 1/11/2024 12:46 PM.  Learner: Patient  Readiness: Acceptance  Method: Explanation, Demonstration, Handout  Response: Verbalizes Understanding    ADL Training, taught by Bentley Pereira Jr. OT at 1/11/2024 12:46 PM.  Learner: Patient  Readiness: Acceptance  Method: Explanation, Demonstration, Handout  Response: Verbalizes Understanding    Education Comments  No comments found.         Goals:  Encounter Problems       Encounter Problems (Active)       OT Goals       Pt will complete all functional transfers and mobility with mod indep using a RW. (Progressing)       Start:  01/11/24    Expected End:  01/25/24            Pt will complete all ADL's/IADL's with mod indep using a device as needed. (Progressing)       Start:  01/11/24    Expected End:  01/25/24            Pt will tolerate functional mobility for a household distance using a RW with mod indep. (Progressing)       Start:  01/11/24    Expected End:  01/25/24

## 2024-01-11 NOTE — CARE PLAN
Problem: Mobility  Goal: Bed mobility including supine to sit and sit to supine with supervision.  Outcome: Progressing  Goal: Ambulate 60 feet with rolling walker and supervision.  Outcome: Progressing     Problem: Transfers  Goal: Transfers including sit to stand and stand to sit with supervision.  Outcome: Progressing

## 2024-01-11 NOTE — CONSULTS
"Inpatient consult to Cardiology  Consult performed by: GRACIE Morales-CNP  Consult ordered by: Brigida Mckeon PA-C  Reason for consult: Atrial fibrillation        History Of Present Illness:    Ruth Veloz is a 73 y.o. female presenting with scheduled hip repair with post-operative atrial fibrillation.  Current with Dr. Watts.  Past medical history of paroxysmal atrial fibrillation on sotalol.  Patient underwent scheduled replacement of left hip.  Successful procedure.  Postoperatively patient did have recurrence of rapid atrial fibrillation.  Has a history of paroxysmal atrial fibrillation on sotalol.  Sotalol was held prior to procedure.  Additionally her apixaban has been placed on hold 5 days prior to procedure.  Reports no complaints chest pain or pressure, palpitations or feeling of rapid heart rate.  Continues with postoperative care.     Last Recorded Vitals:  Vitals:    01/10/24 1857 01/10/24 2325 01/11/24 0500 01/11/24 0703   BP: 120/68 143/65 112/87 122/61   BP Location: Right arm Right arm Right arm Right arm   Patient Position: Lying Lying Lying Lying   Pulse: 54 103 108 73   Resp: 14 14 14 14   Temp: 36.4 °C (97.5 °F) 36.3 °C (97.3 °F) 36.6 °C (97.9 °F) 36.7 °C (98.1 °F)   TempSrc: Axillary Axillary Axillary Axillary   SpO2: 94% 99% 93% 99%   Weight: 58 kg (127 lb 13.9 oz)      Height: 1.549 m (5' 0.98\")          Last Labs:  CBC - 12/27/2023: 10:01 AM  7.3 14.4 280    46.1      CMP - 12/27/2023: 10:01 AM  9.8 6.9 20 --- 0.5   _ 4.2 11 56      PTT - 12/27/2023: 10:01 AM  1.1   11.9 30.8     LDL Calculated   Date/Time Value Ref Range Status   09/08/2023 08:03  65 - 130 MG/DL Final   09/01/2022 07:42  65 - 130 MG/DL Final   08/23/2021 07:58  (H) 65 - 130 MG/DL Final      Last I/O:  I/O last 3 completed shifts:  In: 2600 (44.8 mL/kg) [I.V.:2400 (41.4 mL/kg); IV Piggyback:200]  Out: 675 (11.6 mL/kg) [Urine:500 (0.2 mL/kg/hr); Blood:175]  Weight: 58 kg     Past " Cardiology Tests (Last 3 Years):    Echo: 6/22: 50 to 54%, mild left atrial dilatation, mild to moderate mitral and aortic valve regurgitation      Past Medical History:  She has a past medical history of A-fib (CMS/HCC), GERD (gastroesophageal reflux disease), Hip pain, acute, left, Osteopenia, Personal history of diseases of the blood and blood-forming organs and certain disorders involving the immune mechanism, and Personal history of other diseases of the digestive system.    Past Surgical History:  She has a past surgical history that includes Other surgical history (12/13/2021) and Small intestine surgery (2001).      Social History:  She reports that she has never smoked. She has never used smokeless tobacco. She reports that she does not currently use alcohol. She reports that she does not use drugs.    Family History:  Family History   Problem Relation Name Age of Onset    Other (leaky valve) Mother      Stroke Mother      Hypertension Mother      Kidney failure Father      Cushing syndrome Sister      No Known Problems Brother      No Known Problems Daughter          Allergies:  Amoxicillin, Amoxicillin-pot clavulanate, Flunisolide, Penicillin, and Sulfa (sulfonamide antibiotics)    Inpatient Medications:  Scheduled medications   Medication Dose Route Frequency    apixaban  2.5 mg oral q12h NEHEMIAS    famotidine  20 mg oral BID    gabapentin  200 mg oral Nightly    [Held by provider] metoprolol tartrate  25 mg oral BID    polyethylene glycol  17 g oral Daily    sotalol  40 mg oral Nightly    [Held by provider] sotalol  80 mg oral Daily    sotalol  80 mg oral Daily     PRN medications   Medication    acetaminophen    morphine    naloxone    naloxone    ondansetron    oxyCODONE-acetaminophen     Continuous Medications   Medication Dose Last Rate    oxygen  2 L/min 2 L/min (01/10/24 7437)    sodium chloride 0.9%  100 mL/hr 100 mL/hr (01/11/24 9997)     Outpatient Medications:  Current Outpatient Medications    Medication Instructions    cholecalciferol (VITAMIN D-3) 4,000 Units, oral, Daily    denosumab (Prolia) 60 mg/mL syringe subcutaneous    Eliquis 5 mg, oral, 2 times daily    famotidine (PEPCID) 20 mg, oral, 2 times daily, As needed    fluticasone (Flonase) 50 mcg/actuation nasal spray 1-2 sprays, Each Nostril, Daily    gabapentin (NEURONTIN) 200 mg, oral, Nightly    metoprolol tartrate (LOPRESSOR) 25 mg, oral, 2 times daily PRN    sotalol (Betapace) 80 mg tablet oral, one tab in the am and 1/2 at night Orally       Physical Exam:  General: alert, oriented x 3, very pleasant  HEENT: normal cephalic, atraumatic  Neck: No JVD, bruit or thrill, masses or tenderness   Heart: S1/S2, Rate 90, Rhythm irregular, no s3 or s4, no murmur, thrill, or heaves at PMI.   Lungs: Clear, equal expansion and excursion, no wheezes, crackles, rhales or rhonci  Abdomen: bowel sounds x 4, soft, non-tender  Genitourinary: deferred   Musculoskeletal: abductor pillow noted.  Maintains pulses and sensation distally.  No significant upper or lower extremity edema appreciated.     Assessment/Plan   Status post right total hip repair  Paroxysmal atrial fibrillation  Rapid atrial fibrillation  GERD    Overall impression:    1/11: As above, presents for scheduled left total hip repair.  Successful procedure.  Postoperatively patient did have some episodes of rapid atrial fibrillation.  She does have a known atrial fibrillation.  Her sotalol was placed on hold for procedure, this may have exacerbated her atrial fibrillation in combination with the procedure.  Time my assessment patient is rate controlled with heart rate in the 90s.  She will resume her sotalol this morning.  She does have available as needed metoprolol if necessary for further rate control.  Given she is now in atrial fibrillation would like to resume her home Eliquis today, surgical team is in agreement with this.  Otherwise patient is chest pain-free and euvolemic.  Breathing  comfortably on room air with current pulse ox of 99%.  Overall stable from a cardiac perspective for discharge once cleared by Ortho.  Will sign off.  Patient to follow-up with Dr. Watts in the outpatient setting per current outpatient schedule.       Code Status:  Full Code    I spent 60 minutes in the professional and overall care of this patient.        Jim Malloy, APRN-CNP

## 2024-01-11 NOTE — PROGRESS NOTES
"Ruth Veloz is a 73 y.o. female on day 0 of admission presenting with Primary osteoarthritis of left hip.    Subjective    does complain of some left hip pain but tolerating left hip pain reasonably well       Objective        left hip and lower extremity: Position is clinically satisfactory.  There is no pain with gentle range of motion.  Left calf and thigh are nontender and nonswollen.  Left lower extremity neurovascular is intact.  I did review the x-rays listed below which shows possible lateral subluxation of the left hip replacement.  Last Recorded Vitals  Blood pressure 107/65, pulse 54, temperature 37 °C (98.6 °F), temperature source Axillary, resp. rate 16, height 1.549 m (5' 0.98\"), weight 58 kg (127 lb 13.9 oz), SpO2 95 %.  Intake/Output last 3 Shifts:  I/O last 3 completed shifts:  In: 2600 (44.8 mL/kg) [I.V.:2400 (41.4 mL/kg); IV Piggyback:200]  Out: 675 (11.6 mL/kg) [Urine:500 (0.2 mL/kg/hr); Blood:175]  Weight: 58 kg     Relevant Results    XR hip left with pelvis when performed 1 view    Result Date: 1/10/2024  Interpreted By:  Ramon Gamble, STUDY: XR HIP LEFT WITH PELVIS WHEN PERFORMED 1 VIEW; ;  1/10/2024 3:55 pm   INDICATION: Signs/Symptoms:Post op hip.   COMPARISON: 11/09/2023   ACCESSION NUMBER(S): [id number]   ORDERING CLINICIAN: SADE ALTMAN   FINDINGS: Single limited view the left hip. Interval postsurgical changes of left total hip arthroplasty. There is soft tissue swelling and gas overlying the left hip.   On this single limited radiograph there appears to be slight lateral subluxation of the femoral head with respect to the acetabular cup. Correlation and possible repeat radiographs is recommended.       See findings.     MACRO: None   Signed by: Ramon Gamble 1/10/2024 4:13 PM Dictation workstation:   [id number]    ECG 12 Lead    Result Date: 12/21/2023  Atrial flutter and NSSTTW changes     Options are discussed with the patient in detail.   We did make an initial " attempt to get this patient up and moving in physical therapy.  She is able to take steps with a walker.  However I discussed this patient with the physical therapist and she is having some difficulty advancing her left lower extremity.  I will reevaluate with x-rays of the left hip which I have ordered and will obviously continue to follow this patient. Please note that this report has been produced using speech recognition software.  It may contain errors related to grammar, punctuation or spelling.  Electronically signed, but not reviewed.  Scheduled medications  apixaban, 2.5 mg, oral, q12h NEHEMIAS  famotidine, 20 mg, oral, BID  gabapentin, 200 mg, oral, Nightly  polyethylene glycol, 17 g, oral, Daily  sotalol, 40 mg, oral, Nightly  sotalol, 80 mg, oral, Daily      Continuous medications  oxygen, 2 L/min, Last Rate: 2 L/min (01/10/24 1815)      PRN medications  PRN medications: acetaminophen, metoprolol tartrate, morphine, naloxone, naloxone, ondansetron, oxyCODONE-acetaminophen  Results for orders placed or performed during the hospital encounter of 01/10/24 (from the past 24 hour(s))   CBC   Result Value Ref Range    WBC 8.2 4.4 - 11.3 x10*3/uL    nRBC 0.0 0.0 - 0.0 /100 WBCs    RBC 4.03 4.00 - 5.20 x10*6/uL    Hemoglobin 12.0 12.0 - 16.0 g/dL    Hematocrit 38.0 36.0 - 46.0 %    MCV 94 80 - 100 fL    MCH 29.8 26.0 - 34.0 pg    MCHC 31.6 (L) 32.0 - 36.0 g/dL    RDW 13.7 11.5 - 14.5 %    Platelets 204 150 - 450 x10*3/uL   PST Top   Result Value Ref Range    Extra Tube Hold for add-ons.    Basic Metabolic Panel   Result Value Ref Range    Glucose 91 65 - 99 mg/dL    Sodium 137 133 - 145 mmol/L    Potassium 4.0 3.4 - 5.1 mmol/L    Chloride 103 97 - 107 mmol/L    Bicarbonate 21 (L) 24 - 31 mmol/L    Urea Nitrogen 17 8 - 25 mg/dL    Creatinine 0.70 0.40 - 1.60 mg/dL    eGFR >90 >60 mL/min/1.73m*2    Calcium 8.7 8.5 - 10.4 mg/dL    Anion Gap 13 <=19 mmol/L   Urinalysis with Reflex Culture and Microscopic   Result Value  Ref Range    Color, Urine Light-Yellow Light-Yellow, Yellow, Dark-Yellow    Appearance, Urine Clear Clear    Specific Gravity, Urine 1.032 1.005 - 1.035    pH, Urine 5.0 5.0, 5.5, 6.0, 6.5, 7.0, 7.5, 8.0    Protein, Urine 20 (TRACE) NEGATIVE, 10 (TRACE), 20 (TRACE) mg/dL    Glucose, Urine Normal Normal mg/dL    Blood, Urine NEGATIVE NEGATIVE    Ketones, Urine 60 (2+) (A) NEGATIVE mg/dL    Bilirubin, Urine NEGATIVE NEGATIVE    Urobilinogen, Urine Normal Normal mg/dL    Nitrite, Urine NEGATIVE NEGATIVE    Leukocyte Esterase, Urine NEGATIVE NEGATIVE   Urinalysis Microscopic   Result Value Ref Range    WBC, Urine 1-5 1-5, NONE /HPF    RBC, Urine 1-2 NONE, 1-2, 3-5 /HPF    Squamous Epithelial Cells, Urine 1-9 (SPARSE) Reference range not established. /HPF    Bacteria, Urine 1+ (A) NONE SEEN /HPF    Mucus, Urine 2+ Reference range not established. /LPF    Hyaline Casts, Urine 1+ (A) NONE /LPF                            Assessment/Plan   Principal Problem:    Primary osteoarthritis of left hip  Active Problems:    Aortic valve regurgitation    Gastroesophageal reflux disease    Paroxysmal atrial fibrillation (CMS/HCC)    Pure hypercholesterolemia    Mitral regurgitation      Assessment: Status post left total hip replacement with possible lateral subluxation    Plan: Will reevaluate with another x-ray of the left hip and we will continue to discuss treatment options with the patient and physical therapy       I spent  30 minutes in the professional and overall care of this patient today.      Tirso Medina MD

## 2024-01-11 NOTE — CARE PLAN
Problem: Mobility  Goal: Bed mobility including supine to sit and sit to supine with supervision.  1/11/2024 1553 by Guille Lerma, PT  Outcome: Progressing  1/11/2024 1255 by Guille Lerma, PT  Outcome: Progressing     Problem: Transfers  Goal: Transfers including sit to stand and stand to sit with supervision.  1/11/2024 1553 by Guille Lerma, PT  Outcome: Progressing  1/11/2024 1255 by Guille Lerma, PT  Outcome: Progressing

## 2024-01-11 NOTE — PROGRESS NOTES
Ruth Veloz is a 73 y.o. female on day 0 of admission presenting with Primary osteoarthritis of left hip.      Subjective   Patient with urinary retention. Unable to urinate overnight, straight cath utilized with 500 ml out. Still cannot urinate.    Reports hip pain is 6/10, says she is waiting for her percocet to kick in. No SOB. Not much appetite.       Objective     Last Recorded Vitals  /61 (BP Location: Right arm, Patient Position: Lying)   Pulse 73   Temp 36.7 °C (98.1 °F) (Axillary)   Resp 14   Wt 58 kg (127 lb 13.9 oz)   SpO2 99%   Intake/Output last 3 Shifts:    Intake/Output Summary (Last 24 hours) at 1/11/2024 0942  Last data filed at 1/11/2024 0623  Gross per 24 hour   Intake 2600 ml   Output 675 ml   Net 1925 ml       Admission Weight  Weight: 58 kg (127 lb 13.9 oz) (01/10/24 1857)    Daily Weight  01/10/24 : 58 kg (127 lb 13.9 oz)    Image Results  XR hip left with pelvis when performed 1 view  Narrative: Interpreted By:  Ramon Gamble,   STUDY:  XR HIP LEFT WITH PELVIS WHEN PERFORMED 1 VIEW; ;  1/10/2024 3:55 pm      INDICATION:  Signs/Symptoms:Post op hip.      COMPARISON:  11/09/2023      ACCESSION NUMBER(S):  [id number]      ORDERING CLINICIAN:  SADE ALTMAN      FINDINGS:  Single limited view the left hip.  Interval postsurgical changes of left total hip arthroplasty. There  is soft tissue swelling and gas overlying the left hip.      On this single limited radiograph there appears to be slight lateral  subluxation of the femoral head with respect to the acetabular cup.  Correlation and possible repeat radiographs is recommended.      Impression: See findings.          MACRO:  None      Signed by: Ramon Gamble 1/10/2024 4:13 PM  Dictation workstation:   [id number]      Physical Exam  General: alert, no diaphoresis   Lungs: CTA BL   Heart: irregularly irregular, no LE edema BL   GI: abdomen soft, nontender, nondistended, BS present   MSK: no joint effusion or  deformity   Skin: no rashes, erythema, or ecchymosis   Neuro: grossly normal cognition, motor strength, sensation    Relevant Results               Assessment/Plan        Urinary retention  - check UA, place cunningham catheter. Patient thinks she is having a hard time urinating in supine position. Hopefully when she gets moving with PT and can get up to bedside commode or bathroom, she'll have an easier time.  - check BMP    Paroxysmal afib  - sotalol, eliquis  - metoprolol prn  - cardio saw and signed off; she is to follow up with Dr. Watts    Arthroplasty of left hip  - management/pain management per Dr. Medina    Encourage IS  Discontinue IVF  PT/OT              Lantete Walsh DO

## 2024-01-11 NOTE — NURSING NOTE
Order review at end of shift completed. Patient on bed awake, no distress noted.  Expresses no other needs at the present.  Safety measures in place, call light within reach.  Plan of care ongoing.

## 2024-01-11 NOTE — PROGRESS NOTES
MSW met with pt at bedside to introduce self and role of dc planning care coordination. Pt reports that she lives at home with her spouse in a 2 story home. They have ordered a cot so pt can be on the first floor at dc. Pt reports being ITPA with ADL's and IADL's. Pt PCP is [facility] and she uses [facility] on [address]. Pt stated her  is very supportive. PT/OT note was not in at time of assessment. MSW discussed both SNF vs HHC. Pt would like to return home with [facility]. PT is recommending Mod intensity but pt does not want to go to SNF. Might need to readdress dc plan pending PT rec. Pt will need Internal HHC order.    Suzi JEROMEA, LSW

## 2024-01-11 NOTE — NURSING NOTE
Assumed patient care. BSSR received from previous Nurse. Seen patient lying on bed in semi fowlers position, awake, no distress or discomfort noted. Needs attended. Safety measures ensured and call light in reach.   Plan of care ongoing.    1932H:  Called Hospitalist, Dr. Frederick about the patient's complaint of Nausea.  Orders made and carried out.    1950H:  Explained the procedure/ purpose for the Female purewick, patient aggreed, applied, PCA Tati was in the room.  Patient well tolerated.  Safety measures ensured and call light within reach.    0320H:  Patient complained of unable to pass urine but there's an urge to urinate.  Bladder scan done with a result of 454 mL.  Informed Dr. De La Rosa, Hospitalist with order of straight cath made and carried out.    Explained the procedure to the patient and agreeable for the procedure.  Procedure done by Nurse Zendejas, patient well tolerated.

## 2024-01-11 NOTE — NURSING NOTE
Pt A&O x3 currently resting in bed. Complaints of urinary retention.  No other complaints or concerns. Call light within reach.  Pt continues on IV fluids per order.  Dsg dry and intact to left hip

## 2024-01-12 ENCOUNTER — ANESTHESIA EVENT (OUTPATIENT)
Dept: OPERATING ROOM | Facility: HOSPITAL | Age: 74
DRG: 470 | End: 2024-01-12
Payer: MEDICARE

## 2024-01-12 PROBLEM — Z96.649 DISLOCATION OF HIP PROSTHESIS (CMS-HCC): Status: ACTIVE | Noted: 2023-11-09

## 2024-01-12 PROBLEM — T84.029A DISLOCATION OF HIP PROSTHESIS (CMS-HCC): Status: ACTIVE | Noted: 2023-11-09

## 2024-01-12 PROCEDURE — 7100000011 HC EXTENDED STAY RECOVERY HOURLY - NURSING UNIT

## 2024-01-12 PROCEDURE — 2500000004 HC RX 250 GENERAL PHARMACY W/ HCPCS (ALT 636 FOR OP/ED): Performed by: PHYSICIAN ASSISTANT

## 2024-01-12 PROCEDURE — 2500000001 HC RX 250 WO HCPCS SELF ADMINISTERED DRUGS (ALT 637 FOR MEDICARE OP): Performed by: PHYSICIAN ASSISTANT

## 2024-01-12 PROCEDURE — 2500000001 HC RX 250 WO HCPCS SELF ADMINISTERED DRUGS (ALT 637 FOR MEDICARE OP): Performed by: INTERNAL MEDICINE

## 2024-01-12 RX ADMIN — SOTALOL HYDROCHLORIDE 40 MG: 80 TABLET ORAL at 22:02

## 2024-01-12 RX ADMIN — OXYCODONE AND ACETAMINOPHEN 1 TABLET: 325; 5 TABLET ORAL at 14:18

## 2024-01-12 RX ADMIN — OXYCODONE AND ACETAMINOPHEN 1 TABLET: 325; 5 TABLET ORAL at 22:02

## 2024-01-12 RX ADMIN — OXYCODONE AND ACETAMINOPHEN 1 TABLET: 325; 5 TABLET ORAL at 09:35

## 2024-01-12 RX ADMIN — MORPHINE SULFATE 2 MG: 2 INJECTION, SOLUTION INTRAMUSCULAR; INTRAVENOUS at 07:47

## 2024-01-12 RX ADMIN — MORPHINE SULFATE 2 MG: 2 INJECTION, SOLUTION INTRAMUSCULAR; INTRAVENOUS at 03:12

## 2024-01-12 RX ADMIN — SOTALOL HYDROCHLORIDE 80 MG: 80 TABLET ORAL at 08:47

## 2024-01-12 RX ADMIN — GABAPENTIN 200 MG: 100 CAPSULE ORAL at 22:01

## 2024-01-12 RX ADMIN — FAMOTIDINE 20 MG: 20 TABLET ORAL at 22:01

## 2024-01-12 RX ADMIN — OXYCODONE AND ACETAMINOPHEN 1 TABLET: 325; 5 TABLET ORAL at 18:22

## 2024-01-12 ASSESSMENT — COGNITIVE AND FUNCTIONAL STATUS - GENERAL
PERSONAL GROOMING: A LOT
STANDING UP FROM CHAIR USING ARMS: A LOT
DAILY ACTIVITIY SCORE: 17
PERSONAL GROOMING: A LOT
MOVING TO AND FROM BED TO CHAIR: A LOT
TOILETING: A LITTLE
TURNING FROM BACK TO SIDE WHILE IN FLAT BAD: A LITTLE
CLIMB 3 TO 5 STEPS WITH RAILING: TOTAL
MOVING FROM LYING ON BACK TO SITTING ON SIDE OF FLAT BED WITH BEDRAILS: A LOT
WALKING IN HOSPITAL ROOM: TOTAL
HELP NEEDED FOR BATHING: A LOT
TURNING FROM BACK TO SIDE WHILE IN FLAT BAD: A LOT
STANDING UP FROM CHAIR USING ARMS: TOTAL
TURNING FROM BACK TO SIDE WHILE IN FLAT BAD: A LOT
HELP NEEDED FOR BATHING: A LOT
DRESSING REGULAR LOWER BODY CLOTHING: TOTAL
DRESSING REGULAR UPPER BODY CLOTHING: A LITTLE
DAILY ACTIVITIY SCORE: 14
DRESSING REGULAR LOWER BODY CLOTHING: TOTAL
WALKING IN HOSPITAL ROOM: A LOT
DRESSING REGULAR LOWER BODY CLOTHING: A LOT
WALKING IN HOSPITAL ROOM: TOTAL
MOBILITY SCORE: 11
DAILY ACTIVITIY SCORE: 14
TOILETING: A LOT
MOVING TO AND FROM BED TO CHAIR: A LOT
MOBILITY SCORE: 11
MOVING FROM LYING ON BACK TO SITTING ON SIDE OF FLAT BED WITH BEDRAILS: A LITTLE
HELP NEEDED FOR BATHING: A LOT
TOILETING: A LOT
STANDING UP FROM CHAIR USING ARMS: A LOT
MOBILITY SCORE: 10
DRESSING REGULAR UPPER BODY CLOTHING: A LITTLE
MOVING TO AND FROM BED TO CHAIR: A LOT
DRESSING REGULAR UPPER BODY CLOTHING: A LITTLE
CLIMB 3 TO 5 STEPS WITH RAILING: TOTAL
CLIMB 3 TO 5 STEPS WITH RAILING: TOTAL
MOVING FROM LYING ON BACK TO SITTING ON SIDE OF FLAT BED WITH BEDRAILS: A LOT
PERSONAL GROOMING: A LITTLE

## 2024-01-12 ASSESSMENT — PAIN SCALES - GENERAL
PAINLEVEL_OUTOF10: 4
PAINLEVEL_OUTOF10: 10 - WORST POSSIBLE PAIN
PAINLEVEL_OUTOF10: 7
PAINLEVEL_OUTOF10: 0 - NO PAIN
PAINLEVEL_OUTOF10: 0 - NO PAIN
PAINLEVEL_OUTOF10: 7
PAINLEVEL_OUTOF10: 6
PAINLEVEL_OUTOF10: 0 - NO PAIN
PAINLEVEL_OUTOF10: 8
PAINLEVEL_OUTOF10: 6
PAINLEVEL_OUTOF10: 3

## 2024-01-12 ASSESSMENT — PAIN - FUNCTIONAL ASSESSMENT
PAIN_FUNCTIONAL_ASSESSMENT: 0-10
PAIN_FUNCTIONAL_ASSESSMENT: FLACC (FACE, LEGS, ACTIVITY, CRY, CONSOLABILITY)
PAIN_FUNCTIONAL_ASSESSMENT: 0-10
PAIN_FUNCTIONAL_ASSESSMENT: FLACC (FACE, LEGS, ACTIVITY, CRY, CONSOLABILITY)
PAIN_FUNCTIONAL_ASSESSMENT: 0-10
PAIN_FUNCTIONAL_ASSESSMENT: FLACC (FACE, LEGS, ACTIVITY, CRY, CONSOLABILITY)
PAIN_FUNCTIONAL_ASSESSMENT: 0-10

## 2024-01-12 ASSESSMENT — PAIN DESCRIPTION - ORIENTATION
ORIENTATION: LEFT
ORIENTATION: LOWER
ORIENTATION: LEFT

## 2024-01-12 ASSESSMENT — PAIN DESCRIPTION - LOCATION
LOCATION: BACK
LOCATION: HIP
LOCATION: HIP

## 2024-01-12 NOTE — NURSING NOTE
Assumed care of pt at this time. Pt resting comfortably in bed with call light in reach. Family present at bedside.

## 2024-01-12 NOTE — PROGRESS NOTES
Physical Therapy                 Therapy Communication Note    Patient Name: Ruth Veloz  MRN: [id number]  Today's Date: 1/12/2024     Discipline: Physical Therapy    Missed Visit Reason: Missed Visit Reason: Patient placed on medical hold (pt returning to the OR today with Dr Medina due to left lateral hip worsening subluxation. Will cancel for today and proceed per post-op orders 1/13/24 as appropriate.)    Missed Time: Cancel    Comment:

## 2024-01-12 NOTE — CARE PLAN
The patient's goals for the shift include      The clinical goals for the shift include Pain control    Over the shift, the patient did not make progress toward the following goals. Barriers to progression include . Recommendations to address these barriers include .

## 2024-01-12 NOTE — NURSING NOTE
Pt A&O x3 currently resting in bed. No complaints or concerns. Call light within reach. Pt npo for possible procedure today.  Dsg dry and intact to left hip.

## 2024-01-12 NOTE — NURSING NOTE
Dr. Medina notified of XR results. Pt is going to go back into operating room tomorrow. Deshawn held per Dr. Medina.

## 2024-01-12 NOTE — CARE PLAN
The patient's goals for the shift include  control pain and get comfortable    The clinical goals for the shift include Pain control

## 2024-01-12 NOTE — PROGRESS NOTES
"Ruth Veloz is a 73 y.o. female on day 0 of admission presenting with Primary osteoarthritis of left hip.    Subjective   The patient continues to have some left hip pain but is tolerating her left hip pain reasonably well.  She has no chest pain or shortness of breath.       Objective     Physical Exam  Left hip and lower extremity: Position appears to be clinically satisfactory.  Patient is able to dorsiflex and plantarflex her left foot.  Sensation is intact.  Left calf and thigh are nontender and nonswollen.  I again reviewed the most recent x-ray results with the patient which show lateral subluxation of the left total hip replacement.  Last Recorded Vitals  Blood pressure 92/50, pulse 66, temperature 36.6 °C (97.9 °F), temperature source Oral, resp. rate 14, height 1.549 m (5' 0.98\"), weight 58 kg (127 lb 13.9 oz), SpO2 95 %.  Intake/Output last 3 Shifts:  I/O last 3 completed shifts:  In: 1656 (28.6 mL/kg) [P.O.:120; I.V.:1336 (23 mL/kg); IV Piggyback:200]  Out: 600 (10.3 mL/kg) [Urine:600 (0.3 mL/kg/hr)]  Weight: 58 kg     Relevant Results      Scheduled medications  apixaban, 2.5 mg, oral, q12h NEHEMIAS  famotidine, 20 mg, oral, BID  gabapentin, 200 mg, oral, Nightly  polyethylene glycol, 17 g, oral, Daily  sotalol, 40 mg, oral, Nightly  sotalol, 80 mg, oral, Daily      Continuous medications  oxygen, 2 L/min, Last Rate: 2 L/min (01/10/24 1815)      PRN medications  PRN medications: acetaminophen, metoprolol tartrate, morphine, naloxone, naloxone, ondansetron, oxyCODONE-acetaminophen  No results found for this or any previous visit (from the past 24 hour(s)).                         Assessment/Plan   Principal Problem:    Primary osteoarthritis of left hip  Active Problems:    Aortic valve regurgitation    Gastroesophageal reflux disease    Paroxysmal atrial fibrillation (CMS/HCC)    Pure hypercholesterolemia    Mitral regurgitation    Dislocation of hip prosthesis (CMS/HCC)    Assessment: Lateral " subluxation of left total hip replacement    Plan: Options are discussed with the patient in the presence of her  in detail.  Attempted closed reduction of dislocated left hip, possible open reduction and also possible revision of total hip replacement with indications, alternatives, potential risks including but not lkimited to risk of infection, blood clot, blood loss, dislocation, nerve or blood vessel damage, stroke or death, benefits, unforseen risks, the rehab involved and the fact that no guarantee can be made were all discussed with the patient in detail. The patient understands, accepts and wishes to proceed and understands that if I can reduce this hip in a closed manner and document the reduction on x-ray I would not proceed with further operative treatment at this time.  She further understands that further operative treatment may be needed in the future.  Her  and I agree.  This operation will be postponed until tomorrow morning to further allow the effects of the Eliquis to decrease.  Please note that this report has been produced using speech recognition software. It may contain errors related to grammar, punctuation or spelling. Electronically signed, but not reviewed.modification and fall precautions and risk for further injury with falling or trauma and the use of a cane       I spent 30 minutes in the professional and overall care of this patient today.      Tirso Medina MD

## 2024-01-12 NOTE — CARE PLAN
Sent message to Dr. Medina via [id number] requested an internal order for [facility] for PT OT.  Notified [facility] of pending discharge and order from Dr. Medina.      DISCHARGE PLAN: HOME WITH [facility]   Subjective:     Chief Complaint   Patient presents with    Follow-up        She  is a 60y.o. year old female who presents for evaluation of pain issues swelling the little bit of weight gain despite her gastric bypass surgery. Wants to try a brace for her back issues. ROS:  No chest pain or breathing problems no side effects from medications. Gabapentin helpful for pain issues.       Allergies   Allergen Reactions    Benadryl [Diphenhydramine Hcl] Nausea and Vomiting    Ibuprofen Nausea and Vomiting    Sulfamethoxazole-Trimethoprim Nausea Only    Tramadol Anxiety    Diclofenac Itching    Sulfamethoxazole Nausea Only    Zinc Acetate Other (comments)      Social History     Socioeconomic History    Marital status:     Number of children: 2   Occupational History    Occupation: retired   Tobacco Use    Smoking status: Never Smoker    Smokeless tobacco: Never Used   Vaping Use    Vaping Use: Never used   Substance and Sexual Activity    Alcohol use: No    Drug use: No    Sexual activity: Never   Social History Narrative     has brain damage, lives with him      Family History   Problem Relation Age of Onset    Stroke Mother     Cancer Father     Diabetes Brother     Cancer Maternal Aunt     Cancer Maternal Grandmother     Cancer Brother     Kidney Disease Brother       Past Surgical History:   Procedure Laterality Date    HX ABDOMINAL LAPAROSCOPY      HX CARPAL TUNNEL RELEASE Bilateral more than 10 years ago    HX  SECTION  [id number]    HX COLONOSCOPY  2016    HX GASTRIC BYPASS  1997    x2    HX HYSTERECTOMY      HX PACEMAKER      HX TONSIL AND ADENOIDECTOMY  more than 10 years ago    NEUROLOGICAL PROCEDURE UNLISTED      Bi CTS      Past Medical History:   Diagnosis Date    Asthma     Chest pain     Depression     Diabetes ([address]     Diarrhea     Essential hypertension     Fainting     Fatigue     Hearing loss     Hypoglycemia     This was done in another encounter.  Grisel Flaherty RN    Hypotension 2014    Leg swelling     Memory disorder     Murmur     Nausea and vomiting     Pacemaker     Ringing in ears     SOB (shortness of breath)     Stomach pain     Swallowing difficulty     Syncope and collapse 7/24/15    [facility]    Unintentional weight change             Objective:     Physical Examination:  Visit Vitals  /88 (BP 1 Location: Left upper arm, BP Patient Position: Sitting, BP Cuff Size: Adult)   Pulse 86   Temp 98 °F (36.7 °C) (Temporal)   Resp 18   Ht 4' 9\" (1.448 m)   Wt 173 lb (78.5 kg)   SpO2 97%   BMI 37.44 kg/m²   -    - General: pleasant, no distress, good eye contact, overweight  - Mental status:  Normal mood, behavior, speech, dress, motor activity and thought processes  - Cardiovascular: regular, normal rate, normal S1 and S2, no murmurs/rubs/gallops   - Respiratory: clear to auscultation bilaterally  - Psychiatric: normal mood and affect  - Slight bilateral edema      Labs/Procedures:    No results found for any visits on 06/28/22. Assessment/ Plan:       ICD-10-CM ICD-9-CM    1. Lymphedema  I89.0 457.1 AMB SUPPLY ORDER      potassium chloride SR (KLOR-CON 10) 10 mEq tablet      furosemide (LASIX) 20 mg tablet   2. Sciatica associated with disorder of lumbar spine  M53.86 724.3 AMB SUPPLY ORDER   3. Pain of lower leg, unspecified laterality  M79.669 729.5 gabapentin (NEURONTIN) 100 mg capsule   4. Fibromyalgia affecting lower leg  M79.7 729.1 gabapentin (NEURONTIN) 100 mg capsule   5. Moderate major depression (HCC)  F32.1 296.22 sertraline (ZOLOFT) 100 mg tablet   6. History of gastric bypass  Z98.84 V45.86 cyanocobalamin ER 1,000 mcg tablet      cholecalciferol (VITAMIN D3) (1000 Units /25 mcg) tablet   7.  Migraine with vertigo  G43.109 346.80 meclizine (ANTIVERT) 25 mg chewable tablet     780.4      Orders Placed This Encounter    AMB SUPPLY ORDER     Medical compression hose: size to fit, knee high, medium compression 15-20 mmHG    AMB SUPPLY ORDER     [facility]   [id number]  Recommend a brace for her sciatica    gabapentin (NEURONTIN) 100 mg capsule     Sig: Take 1 Capsule by mouth three (3) times daily. Max Daily Amount: 300 mg. As needed     Dispense:  90 Capsule     Refill:  1    sertraline (ZOLOFT) 100 mg tablet     Sig: Take 1 Tablet by mouth daily. Dispense:  30 Tablet     Refill:  5    cyanocobalamin ER 1,000 mcg tablet     Sig: Take 1 Tablet by mouth daily. Dispense:  90 Tablet     Refill:  3    cholecalciferol (VITAMIN D3) (1000 Units /25 mcg) tablet     Sig: Take 1 Tablet by mouth daily. Dispense:  90 Tablet     Refill:  3    potassium chloride SR (KLOR-CON 10) 10 mEq tablet     Sig: Take 1 Tablet by mouth daily. Dispense:  90 Tablet     Refill:  1    furosemide (LASIX) 20 mg tablet     Sig: Take 1 Tablet by mouth nightly. As needed     Dispense:  90 Tablet     Refill:  1    meclizine (ANTIVERT) 25 mg chewable tablet     Sig: Take 1 Tablet by mouth three (3) times daily as needed for Dizziness. Dispense:  60 Tablet     Refill:  1       I have discussed the diagnosis with the patient and the intended plan as seen in the above orders. The patient has received an after-visit summary and questions were answered concerning future plans. I have discussed medication side effects and warnings with the patient as well. Follow-up and Dispositions    · Return in about 10 weeks (around 9/6/2022) for routine follow up.

## 2024-01-12 NOTE — PROGRESS NOTES
Occupational Therapy                 Therapy Communication Note    Patient Name: Ruth Veloz  MRN: [id number]  Today's Date: 1/12/2024     Discipline: Occupational Therapy    Missed Visit Reason: Missed Visit Reason: Other (Comment) (hold tx-pt to return to OR this date d/t worstening sublux of L hip prosthetic)    Missed Time: Cancel    Comment:

## 2024-01-12 NOTE — PROGRESS NOTES
Ruth Veloz is a 73 y.o. female on day 0 of admission presenting with Primary osteoarthritis of left hip.      Subjective   Patient was examined sitting up in bed.  She states that her pain is controlled on current pain control regimen.       Objective     Last Recorded Vitals  BP 92/50 (BP Location: Right arm, Patient Position: Lying)   Pulse 66   Temp 36.6 °C (97.9 °F) (Oral)   Resp 14   Wt 58 kg (127 lb 13.9 oz)   SpO2 95%   Intake/Output last 3 Shifts:    Intake/Output Summary (Last 24 hours) at 1/12/2024 1246  Last data filed at 1/12/2024 1153  Gross per 24 hour   Intake 300 ml   Output 750 ml   Net -450 ml         Admission Weight  Weight: 58 kg (127 lb 13.9 oz) (01/10/24 1857)    Daily Weight  01/10/24 : 58 kg (127 lb 13.9 oz)    Image Results  XR hip left with pelvis when performed 1 view  Narrative: Interpreted By:  Benji Cat,   STUDY:  XR HIP LEFT WITH PELVIS WHEN PERFORMED 1 VIEW; ;  1/11/2024 6:15 pm      INDICATION:  Signs/Symptoms:Reevaluate.      COMPARISON:  01/10/2024      ACCESSION NUMBER(S):  [id number]      ORDERING CLINICIAN:  SAIDA NORMAN      FINDINGS:  There is again total left hip arthroplasty.  The prosthetic left femoral head appears to be subluxated laterally  to the prosthetic acetabular cup; allowing for differences in  technique the subluxation appears to have worsened since the prior  exam. No acute fracture is seen.  There is no lucency around the hardware to suggest loosening.  Postsurgical gas again overlies the soft tissues.      Impression: Persistent worsening lateral subluxation of the prosthetic left  femoral head in relation to the prosthetic acetabular cup.      Signed by: Benji Cat 1/11/2024 6:24 PM  Dictation workstation:   [id number]      Physical Exam  General: alert, no diaphoresis   Lungs: CTA BL   Heart: irregularly irregular, no LE edema BL   GI: abdomen soft, nontender, nondistended, BS present   MSK: no joint effusion or deformity    Skin: no rashes, erythema, or ecchymosis   Neuro: grossly normal cognition, motor strength, sensation    Relevant Results               Assessment/Plan        Urinary retention  - Much improved today,  -Using bedside commode with assistance  -Coto catheter has been discontinued    Paroxysmal afib  - sotalol, eliquis  - metoprolol prn  - cardio saw and signed off; she is to follow up with Dr. Watts    Arthroplasty of left hip  - management/pain management per Dr. Medina  -Status post left total hip arthroplasty on 1/10/2024  -Prosthesis is dislocated and will have closed versus open reduction tomorrow    Encourage IS  PT/OT              GRACIE Belle-CNP

## 2024-01-12 NOTE — NURSING NOTE
No change from previous assessment. Pt resting comfortably in bed with call light in reach. Pt in no apparent distress at this time.

## 2024-01-13 ENCOUNTER — APPOINTMENT (OUTPATIENT)
Dept: RADIOLOGY | Facility: HOSPITAL | Age: 74
DRG: 470 | End: 2024-01-13
Payer: MEDICARE

## 2024-01-13 ENCOUNTER — APPOINTMENT (OUTPATIENT)
Dept: CARDIOLOGY | Facility: HOSPITAL | Age: 74
DRG: 470 | End: 2024-01-13
Payer: MEDICARE

## 2024-01-13 ENCOUNTER — ANESTHESIA (OUTPATIENT)
Dept: OPERATING ROOM | Facility: HOSPITAL | Age: 74
DRG: 470 | End: 2024-01-13
Payer: MEDICARE

## 2024-01-13 PROBLEM — Z96.649: Status: ACTIVE | Noted: 2024-01-13

## 2024-01-13 PROBLEM — T84.029A: Status: ACTIVE | Noted: 2024-01-13

## 2024-01-13 LAB
ANION GAP SERPL CALC-SCNC: 12 MMOL/L
BUN SERPL-MCNC: 8 MG/DL (ref 8–25)
CALCIUM SERPL-MCNC: 8.7 MG/DL (ref 8.5–10.4)
CHLORIDE SERPL-SCNC: 106 MMOL/L (ref 97–107)
CO2 SERPL-SCNC: 23 MMOL/L (ref 24–31)
CREAT SERPL-MCNC: 0.7 MG/DL (ref 0.4–1.6)
EGFRCR SERPLBLD CKD-EPI 2021: >90 ML/MIN/1.73M*2
ERYTHROCYTE [DISTWIDTH] IN BLOOD BY AUTOMATED COUNT: 13.8 % (ref 11.5–14.5)
GLUCOSE SERPL-MCNC: 96 MG/DL (ref 65–99)
HCT VFR BLD AUTO: 30.5 % (ref 36–46)
HGB BLD-MCNC: 9.6 G/DL (ref 12–16)
MCH RBC QN AUTO: 29.1 PG (ref 26–34)
MCHC RBC AUTO-ENTMCNC: 31.5 G/DL (ref 32–36)
MCV RBC AUTO: 92 FL (ref 80–100)
NRBC BLD-RTO: 0 /100 WBCS (ref 0–0)
PLATELET # BLD AUTO: 177 X10*3/UL (ref 150–450)
POTASSIUM SERPL-SCNC: 3.8 MMOL/L (ref 3.4–5.1)
RBC # BLD AUTO: 3.3 X10*6/UL (ref 4–5.2)
SODIUM SERPL-SCNC: 141 MMOL/L (ref 133–145)
WBC # BLD AUTO: 8.8 X10*3/UL (ref 4.4–11.3)

## 2024-01-13 PROCEDURE — 2500000004 HC RX 250 GENERAL PHARMACY W/ HCPCS (ALT 636 FOR OP/ED): Performed by: ANESTHESIOLOGIST ASSISTANT

## 2024-01-13 PROCEDURE — A27266 PR CLOSED RX POST HIP FIX DISLOC,ANESTH: Performed by: ANESTHESIOLOGIST ASSISTANT

## 2024-01-13 PROCEDURE — 93010 ELECTROCARDIOGRAM REPORT: CPT | Performed by: INTERNAL MEDICINE

## 2024-01-13 PROCEDURE — 2500000005 HC RX 250 GENERAL PHARMACY W/O HCPCS: Performed by: STUDENT IN AN ORGANIZED HEALTH CARE EDUCATION/TRAINING PROGRAM

## 2024-01-13 PROCEDURE — 2500000001 HC RX 250 WO HCPCS SELF ADMINISTERED DRUGS (ALT 637 FOR MEDICARE OP): Performed by: NURSE PRACTITIONER

## 2024-01-13 PROCEDURE — 36415 COLL VENOUS BLD VENIPUNCTURE: CPT | Performed by: NURSE PRACTITIONER

## 2024-01-13 PROCEDURE — 2500000001 HC RX 250 WO HCPCS SELF ADMINISTERED DRUGS (ALT 637 FOR MEDICARE OP): Performed by: ORTHOPAEDIC SURGERY

## 2024-01-13 PROCEDURE — 76000 FLUOROSCOPY <1 HR PHYS/QHP: CPT

## 2024-01-13 PROCEDURE — 85027 COMPLETE CBC AUTOMATED: CPT | Performed by: NURSE PRACTITIONER

## 2024-01-13 PROCEDURE — 0SWBXJZ REVISION OF SYNTHETIC SUBSTITUTE IN LEFT HIP JOINT, EXTERNAL APPROACH: ICD-10-PCS | Performed by: ORTHOPAEDIC SURGERY

## 2024-01-13 PROCEDURE — 2500000005 HC RX 250 GENERAL PHARMACY W/O HCPCS: Performed by: ANESTHESIOLOGIST ASSISTANT

## 2024-01-13 PROCEDURE — 3600000007 HC OR TIME - EACH INCREMENTAL 1 MINUTE - PROCEDURE LEVEL TWO: Performed by: ORTHOPAEDIC SURGERY

## 2024-01-13 PROCEDURE — 27266 TREAT HIP DISLOCATION: CPT | Performed by: ORTHOPAEDIC SURGERY

## 2024-01-13 PROCEDURE — 80048 BASIC METABOLIC PNL TOTAL CA: CPT | Performed by: NURSE PRACTITIONER

## 2024-01-13 PROCEDURE — 7100000001 HC RECOVERY ROOM TIME - INITIAL BASE CHARGE: Performed by: ORTHOPAEDIC SURGERY

## 2024-01-13 PROCEDURE — 2500000004 HC RX 250 GENERAL PHARMACY W/ HCPCS (ALT 636 FOR OP/ED): Performed by: NURSE PRACTITIONER

## 2024-01-13 PROCEDURE — 93005 ELECTROCARDIOGRAM TRACING: CPT

## 2024-01-13 PROCEDURE — 7100000002 HC RECOVERY ROOM TIME - EACH INCREMENTAL 1 MINUTE: Performed by: ORTHOPAEDIC SURGERY

## 2024-01-13 PROCEDURE — 2500000004 HC RX 250 GENERAL PHARMACY W/ HCPCS (ALT 636 FOR OP/ED): Performed by: PHYSICIAN ASSISTANT

## 2024-01-13 PROCEDURE — 3600000002 HC OR TIME - INITIAL BASE CHARGE - PROCEDURE LEVEL TWO: Performed by: ORTHOPAEDIC SURGERY

## 2024-01-13 PROCEDURE — 99232 SBSQ HOSP IP/OBS MODERATE 35: CPT | Performed by: NURSE PRACTITIONER

## 2024-01-13 PROCEDURE — A27266 PR CLOSED RX POST HIP FIX DISLOC,ANESTH: Performed by: STUDENT IN AN ORGANIZED HEALTH CARE EDUCATION/TRAINING PROGRAM

## 2024-01-13 PROCEDURE — 1200000002 HC GENERAL ROOM WITH TELEMETRY DAILY

## 2024-01-13 PROCEDURE — 3700000002 HC GENERAL ANESTHESIA TIME - EACH INCREMENTAL 1 MINUTE: Performed by: ORTHOPAEDIC SURGERY

## 2024-01-13 PROCEDURE — 99100 ANES PT EXTEME AGE<1 YR&>70: CPT | Performed by: STUDENT IN AN ORGANIZED HEALTH CARE EDUCATION/TRAINING PROGRAM

## 2024-01-13 PROCEDURE — 3700000001 HC GENERAL ANESTHESIA TIME - INITIAL BASE CHARGE: Performed by: ORTHOPAEDIC SURGERY

## 2024-01-13 RX ORDER — FENTANYL CITRATE 50 UG/ML
50 INJECTION, SOLUTION INTRAMUSCULAR; INTRAVENOUS EVERY 5 MIN PRN
Status: DISCONTINUED | OUTPATIENT
Start: 2024-01-13 | End: 2024-01-13 | Stop reason: HOSPADM

## 2024-01-13 RX ORDER — ACETAMINOPHEN 325 MG/1
650 TABLET ORAL EVERY 6 HOURS SCHEDULED
Status: DISCONTINUED | OUTPATIENT
Start: 2024-01-13 | End: 2024-01-16 | Stop reason: HOSPADM

## 2024-01-13 RX ORDER — ALBUTEROL SULFATE 0.83 MG/ML
2.5 SOLUTION RESPIRATORY (INHALATION) EVERY 30 MIN PRN
Status: DISCONTINUED | OUTPATIENT
Start: 2024-01-13 | End: 2024-01-13 | Stop reason: HOSPADM

## 2024-01-13 RX ORDER — SOTALOL HYDROCHLORIDE 80 MG/1
40 TABLET ORAL DAILY
Status: DISCONTINUED | OUTPATIENT
Start: 2024-01-14 | End: 2024-01-16 | Stop reason: HOSPADM

## 2024-01-13 RX ORDER — METOPROLOL TARTRATE 1 MG/ML
3 INJECTION, SOLUTION INTRAVENOUS ONCE
Status: COMPLETED | OUTPATIENT
Start: 2024-01-13 | End: 2024-01-13

## 2024-01-13 RX ORDER — SOTALOL HYDROCHLORIDE 80 MG/1
80 TABLET ORAL DAILY
Status: DISCONTINUED | OUTPATIENT
Start: 2024-01-14 | End: 2024-01-16 | Stop reason: HOSPADM

## 2024-01-13 RX ORDER — SOTALOL HYDROCHLORIDE 80 MG/1
80 TABLET ORAL EVERY 12 HOURS
Status: COMPLETED | OUTPATIENT
Start: 2024-01-13 | End: 2024-01-13

## 2024-01-13 RX ORDER — METOPROLOL TARTRATE 1 MG/ML
INJECTION, SOLUTION INTRAVENOUS AS NEEDED
Status: DISCONTINUED | OUTPATIENT
Start: 2024-01-13 | End: 2024-01-13

## 2024-01-13 RX ORDER — POLYETHYLENE GLYCOL 3350 17 G/17G
17 POWDER, FOR SOLUTION ORAL DAILY
Status: DISCONTINUED | OUTPATIENT
Start: 2024-01-13 | End: 2024-01-13

## 2024-01-13 RX ORDER — MIDAZOLAM HYDROCHLORIDE 1 MG/ML
INJECTION, SOLUTION INTRAMUSCULAR; INTRAVENOUS AS NEEDED
Status: DISCONTINUED | OUTPATIENT
Start: 2024-01-13 | End: 2024-01-13

## 2024-01-13 RX ORDER — LABETALOL HYDROCHLORIDE 5 MG/ML
5 INJECTION, SOLUTION INTRAVENOUS EVERY 5 MIN PRN
Status: DISCONTINUED | OUTPATIENT
Start: 2024-01-13 | End: 2024-01-13 | Stop reason: HOSPADM

## 2024-01-13 RX ORDER — FENTANYL CITRATE 50 UG/ML
INJECTION, SOLUTION INTRAMUSCULAR; INTRAVENOUS AS NEEDED
Status: DISCONTINUED | OUTPATIENT
Start: 2024-01-13 | End: 2024-01-13

## 2024-01-13 RX ORDER — PHENYLEPHRINE HCL IN 0.9% NACL 1 MG/10 ML
SYRINGE (ML) INTRAVENOUS AS NEEDED
Status: DISCONTINUED | OUTPATIENT
Start: 2024-01-13 | End: 2024-01-13

## 2024-01-13 RX ORDER — TRANEXAMIC ACID 100 MG/ML
1000 INJECTION, SOLUTION INTRAVENOUS ONCE
Status: DISCONTINUED | OUTPATIENT
Start: 2024-01-13 | End: 2024-01-13 | Stop reason: HOSPADM

## 2024-01-13 RX ORDER — SODIUM CHLORIDE, SODIUM LACTATE, POTASSIUM CHLORIDE, CALCIUM CHLORIDE 600; 310; 30; 20 MG/100ML; MG/100ML; MG/100ML; MG/100ML
INJECTION, SOLUTION INTRAVENOUS CONTINUOUS PRN
Status: DISCONTINUED | OUTPATIENT
Start: 2024-01-13 | End: 2024-01-13

## 2024-01-13 RX ORDER — ESMOLOL HYDROCHLORIDE 10 MG/ML
INJECTION INTRAVENOUS AS NEEDED
Status: DISCONTINUED | OUTPATIENT
Start: 2024-01-13 | End: 2024-01-13

## 2024-01-13 RX ORDER — ONDANSETRON HYDROCHLORIDE 2 MG/ML
4 INJECTION, SOLUTION INTRAVENOUS ONCE AS NEEDED
Status: DISCONTINUED | OUTPATIENT
Start: 2024-01-13 | End: 2024-01-13 | Stop reason: HOSPADM

## 2024-01-13 RX ORDER — IPRATROPIUM BROMIDE 0.5 MG/2.5ML
500 SOLUTION RESPIRATORY (INHALATION) EVERY 30 MIN PRN
Status: DISCONTINUED | OUTPATIENT
Start: 2024-01-13 | End: 2024-01-13 | Stop reason: HOSPADM

## 2024-01-13 RX ORDER — SODIUM CHLORIDE, SODIUM LACTATE, POTASSIUM CHLORIDE, CALCIUM CHLORIDE 600; 310; 30; 20 MG/100ML; MG/100ML; MG/100ML; MG/100ML
40 INJECTION, SOLUTION INTRAVENOUS CONTINUOUS
Status: DISCONTINUED | OUTPATIENT
Start: 2024-01-13 | End: 2024-01-13 | Stop reason: HOSPADM

## 2024-01-13 RX ORDER — DIGOXIN 0.25 MG/ML
250 INJECTION INTRAMUSCULAR; INTRAVENOUS DAILY
Status: DISCONTINUED | OUTPATIENT
Start: 2024-01-13 | End: 2024-01-15

## 2024-01-13 RX ORDER — PROPOFOL 10 MG/ML
INJECTION, EMULSION INTRAVENOUS AS NEEDED
Status: DISCONTINUED | OUTPATIENT
Start: 2024-01-13 | End: 2024-01-13

## 2024-01-13 RX ORDER — SODIUM CHLORIDE 9 MG/ML
100 INJECTION, SOLUTION INTRAVENOUS CONTINUOUS
Status: DISCONTINUED | OUTPATIENT
Start: 2024-01-13 | End: 2024-01-13

## 2024-01-13 RX ADMIN — SODIUM CHLORIDE, POTASSIUM CHLORIDE, SODIUM LACTATE AND CALCIUM CHLORIDE: 600; 310; 30; 20 INJECTION, SOLUTION INTRAVENOUS at 08:43

## 2024-01-13 RX ADMIN — METOPROLOL TARTRATE 3 MG: 1 INJECTION, SOLUTION INTRAVENOUS at 09:46

## 2024-01-13 RX ADMIN — APIXABAN 2.5 MG: 2.5 TABLET, FILM COATED ORAL at 21:04

## 2024-01-13 RX ADMIN — OXYCODONE AND ACETAMINOPHEN 1 TABLET: 325; 5 TABLET ORAL at 17:28

## 2024-01-13 RX ADMIN — DIGOXIN 250 MCG: 0.25 INJECTION INTRAMUSCULAR; INTRAVENOUS at 11:25

## 2024-01-13 RX ADMIN — GABAPENTIN 200 MG: 100 CAPSULE ORAL at 21:04

## 2024-01-13 RX ADMIN — METOPROLOL TARTRATE 2 MG: 5 INJECTION, SOLUTION INTRAVENOUS at 08:53

## 2024-01-13 RX ADMIN — OXYCODONE AND ACETAMINOPHEN 1 TABLET: 325; 5 TABLET ORAL at 22:24

## 2024-01-13 RX ADMIN — SOTALOL HYDROCHLORIDE 80 MG: 80 TABLET ORAL at 11:05

## 2024-01-13 RX ADMIN — MIDAZOLAM 1 MG: 1 INJECTION INTRAMUSCULAR; INTRAVENOUS at 08:48

## 2024-01-13 RX ADMIN — APIXABAN 2.5 MG: 2.5 TABLET, FILM COATED ORAL at 11:05

## 2024-01-13 RX ADMIN — MORPHINE SULFATE 2 MG: 2 INJECTION, SOLUTION INTRAMUSCULAR; INTRAVENOUS at 05:21

## 2024-01-13 RX ADMIN — SOTALOL HYDROCHLORIDE 80 MG: 80 TABLET ORAL at 21:05

## 2024-01-13 RX ADMIN — FENTANYL CITRATE 50 MCG: 0.05 INJECTION, SOLUTION INTRAMUSCULAR; INTRAVENOUS at 08:48

## 2024-01-13 RX ADMIN — ACETAMINOPHEN 650 MG: 325 TABLET ORAL at 17:27

## 2024-01-13 RX ADMIN — Medication: at 08:30

## 2024-01-13 RX ADMIN — MORPHINE SULFATE 2 MG: 2 INJECTION, SOLUTION INTRAMUSCULAR; INTRAVENOUS at 01:42

## 2024-01-13 RX ADMIN — FAMOTIDINE 20 MG: 20 TABLET ORAL at 21:04

## 2024-01-13 RX ADMIN — METOPROLOL TARTRATE 3 MG: 5 INJECTION, SOLUTION INTRAVENOUS at 09:01

## 2024-01-13 RX ADMIN — Medication 200 MCG: at 09:01

## 2024-01-13 RX ADMIN — ACETAMINOPHEN 650 MG: 325 TABLET ORAL at 12:35

## 2024-01-13 RX ADMIN — OXYCODONE AND ACETAMINOPHEN 1 TABLET: 325; 5 TABLET ORAL at 10:57

## 2024-01-13 RX ADMIN — FAMOTIDINE 20 MG: 20 TABLET ORAL at 09:00

## 2024-01-13 RX ADMIN — ESMOLOL HYDROCHLORIDE 30 MG: 10 INJECTION, SOLUTION INTRAVENOUS at 08:46

## 2024-01-13 RX ADMIN — PROPOFOL 40 MG: 10 INJECTION, EMULSION INTRAVENOUS at 08:57

## 2024-01-13 SDOH — HEALTH STABILITY: MENTAL HEALTH: CURRENT SMOKER: 0

## 2024-01-13 ASSESSMENT — COGNITIVE AND FUNCTIONAL STATUS - GENERAL
MOVING TO AND FROM BED TO CHAIR: A LITTLE
MOBILITY SCORE: 16
TOILETING: A LITTLE
DAILY ACTIVITIY SCORE: 18
DRESSING REGULAR LOWER BODY CLOTHING: A LOT
MOVING FROM LYING ON BACK TO SITTING ON SIDE OF FLAT BED WITH BEDRAILS: A LITTLE
WALKING IN HOSPITAL ROOM: A LITTLE
TURNING FROM BACK TO SIDE WHILE IN FLAT BAD: A LITTLE
MOVING TO AND FROM BED TO CHAIR: A LITTLE
DRESSING REGULAR UPPER BODY CLOTHING: A LITTLE
MOBILITY SCORE: 16
TURNING FROM BACK TO SIDE WHILE IN FLAT BAD: A LITTLE
CLIMB 3 TO 5 STEPS WITH RAILING: TOTAL
CLIMB 3 TO 5 STEPS WITH RAILING: A LOT
STANDING UP FROM CHAIR USING ARMS: A LITTLE
DAILY ACTIVITIY SCORE: 19
PERSONAL GROOMING: A LITTLE
DRESSING REGULAR LOWER BODY CLOTHING: A LOT
MOVING FROM LYING ON BACK TO SITTING ON SIDE OF FLAT BED WITH BEDRAILS: A LITTLE
DRESSING REGULAR UPPER BODY CLOTHING: A LITTLE
HELP NEEDED FOR BATHING: A LITTLE
WALKING IN HOSPITAL ROOM: A LOT
HELP NEEDED FOR BATHING: A LITTLE
STANDING UP FROM CHAIR USING ARMS: A LITTLE
TOILETING: A LITTLE

## 2024-01-13 ASSESSMENT — PAIN SCALES - GENERAL
PAINLEVEL_OUTOF10: 7
PAINLEVEL_OUTOF10: 4
PAINLEVEL_OUTOF10: 3
PAINLEVEL_OUTOF10: 0 - NO PAIN
PAINLEVEL_OUTOF10: 3
PAINLEVEL_OUTOF10: 3
PAIN_LEVEL: 0
PAINLEVEL_OUTOF10: 8
PAINLEVEL_OUTOF10: 3
PAINLEVEL_OUTOF10: 6
PAINLEVEL_OUTOF10: 6
PAINLEVEL_OUTOF10: 1
PAINLEVEL_OUTOF10: 1
PAINLEVEL_OUTOF10: 6
PAINLEVEL_OUTOF10: 5 - MODERATE PAIN

## 2024-01-13 ASSESSMENT — PAIN DESCRIPTION - LOCATION
LOCATION: HIP

## 2024-01-13 ASSESSMENT — PAIN DESCRIPTION - ORIENTATION
ORIENTATION: LEFT

## 2024-01-13 NOTE — NURSING NOTE
Patient took all meds whole with water. Answered all questions. Patient NPO at midnight for OR in AM. Patient in agreement. Up to BSC with 1 assist and walker. Placed back in bed without issue. Medicated for pain as requested. Call light within reach. Bed alarm in use.

## 2024-01-13 NOTE — PROGRESS NOTES
Occupational Therapy                 Therapy Communication Note    Patient Name: Ruth Veloz  MRN: [id number]  Today's Date: 1/13/2024     Discipline: Occupational Therapy    Missed Visit Reason: Missed Visit Reason: Patient placed on medical hold, Other (Comment) (Patient placed on medical hold (pt returning to the OR 1/12/24 with Dr Medina due to left lateral hip worsening subluxation. Will cancel and proceed per post-op orders 1/13/24 as appropriate.))    Missed Time: Cancel    Comment:  Therapist approaching [facility] [place] at 0849

## 2024-01-13 NOTE — ANESTHESIA POSTPROCEDURE EVALUATION
Patient: Ruth Veloz    Procedure Summary       Date: 01/13/24 Room / Location: [facility] OR 08 / [facility] OR    Anesthesia Start: 0843 Anesthesia Stop: 0915    Procedure: Hip Dislocation Total Closed Reduction (Left: Hip) Diagnosis:       Dislocation of hip joint prosthesis, initial encounter (CMS/[facility])      (Dislocation of hip joint prosthesis, initial encounter (CMS/[facility]) [T84.029A, Z96.649])    Surgeons: Tirso Medina MD Responsible Provider: Joshua Foote DO    Anesthesia Type: general ASA Status: 3            Anesthesia Type: general    Vitals Value Taken Time   BP 92/61 01/13/24 0921   Temp 36 01/13/24 0925   Pulse 118 01/13/24 0924   Resp 25 01/13/24 0924   SpO2 97 01/13/24 0925       Anesthesia Post Evaluation    Patient location during evaluation: bedside  Patient participation: complete - patient participated  Level of consciousness: awake and alert  Pain score: 0  Pain management: adequate  Multimodal analgesia pain management approach  Airway patency: patent  Two or more strategies used to mitigate risk of obstructive sleep apnea  Cardiovascular status: tachycardic (Pt with chronic Afib with rate from low 100s to 130s.  Dr Henderson Consulted, pt will return to floor with telemetry.)  Respiratory status: acceptable  Hydration status: acceptable  Postoperative Nausea and Vomiting: none        There were no known notable events for this encounter.

## 2024-01-13 NOTE — PROGRESS NOTES
"Ruth Veloz is a 73 y.o. female on day 3 of admission presenting with Primary osteoarthritis of left hip.    Subjective   Alert oriented x 3, denies complaints chest pain or pressure, shortness of breath.  Denies palpitations.  Patient did have spontaneous hip dislocation and return to surgery this morning for a closed reduction.  Intraprocedure patient developed rapid atrial fibrillation.       Objective     Physical Exam    Last Recorded Vitals  Blood pressure 126/61, pulse (!) 132, temperature 36.5 °C (97.7 °F), temperature source Oral, resp. rate 20, height 1.549 m (5' 0.98\"), weight 58 kg (127 lb 13.9 oz), SpO2 95 %.  Intake/Output last 3 Shifts:  I/O last 3 completed shifts:  In: 890 (15.3 mL/kg) [P.O.:890]  Out: 2150 (37.1 mL/kg) [Urine:2150 (1 mL/kg/hr)]  Weight: 58 kg     Relevant Results  Results for orders placed or performed during the hospital encounter of 01/10/24 (from the past 24 hour(s))   CBC   Result Value Ref Range    WBC 8.8 4.4 - 11.3 x10*3/uL    nRBC 0.0 0.0 - 0.0 /100 WBCs    RBC 3.30 (L) 4.00 - 5.20 x10*6/uL    Hemoglobin 9.6 (L) 12.0 - 16.0 g/dL    Hematocrit 30.5 (L) 36.0 - 46.0 %    MCV 92 80 - 100 fL    MCH 29.1 26.0 - 34.0 pg    MCHC 31.5 (L) 32.0 - 36.0 g/dL    RDW 13.8 11.5 - 14.5 %    Platelets 177 150 - 450 x10*3/uL   Basic metabolic panel   Result Value Ref Range    Glucose 96 65 - 99 mg/dL    Sodium 141 133 - 145 mmol/L    Potassium 3.8 3.4 - 5.1 mmol/L    Chloride 106 97 - 107 mmol/L    Bicarbonate 23 (L) 24 - 31 mmol/L    Urea Nitrogen 8 8 - 25 mg/dL    Creatinine 0.70 0.40 - 1.60 mg/dL    eGFR >90 >60 mL/min/1.73m*2    Calcium 8.7 8.5 - 10.4 mg/dL    Anion Gap 12 <=19 mmol/L         Assessment/Plan   Principal Problem:    Primary osteoarthritis of left hip  Active Problems:    Aortic valve regurgitation    Gastroesophageal reflux disease    Paroxysmal atrial fibrillation (CMS/HCC)    Pure hypercholesterolemia    Mitral regurgitation    Dislocation of hip prosthesis " Call back to [place] & told about letter.  She will pick this up later today.    (CMS/[facility])    Status post right total hip repair  Paroxysmal atrial fibrillation  Rapid atrial fibrillation  GERD  Dislocation of hip prosthesis     Overall impression:     1/11: As above, presents for scheduled left total hip repair.  Successful procedure.  Postoperatively patient did have some episodes of rapid atrial fibrillation.  She does have a known atrial fibrillation.  Her sotalol was placed on hold for procedure, this may have exacerbated her atrial fibrillation in combination with the procedure.  Time my assessment patient is rate controlled with heart rate in the 90s.  She will resume her sotalol this morning.  She does have available as needed metoprolol if necessary for further rate control.  Given she is now in atrial fibrillation would like to resume her home Eliquis today, surgical team is in agreement with this.  Otherwise patient is chest pain-free and euvolemic.  Breathing comfortably on room air with current pulse ox of 99%.  Overall stable from a cardiac perspective for discharge once cleared by Ortho.  Will sign off.  Patient to follow-up with Dr. Watts in the outpatient setting per current outpatient schedule.     1/13: Reconsulted for atrial fibrillation intraoperatively, patient had a spontaneous dislocation of hip prosthesis, she was taken to surgery this morning where she underwent a successful closed reduction.  During procedure after propofol patient was noted to return to a rapid atrial fibrillation.  Likely relative to her acute condition as well as with holding her sotalol this morning.  Patient will receive 40 mg of extraskeletal this morning for a total of 80 mg in the morning and 80 mg in the evening.  Give her 1 dose of IV digoxin.  Will check a EKG to reassess her Qtc/JT noting she is in atrial fibrillation.  Additionally she has been placed on telemetry monitor for further monitoring.  Tomorrow if rate controlled she will revert back to her home dosing of sotalol which is 40 mg  in the morning and 80 mg at night.  On assessment she is euvolemic and chest pain-free.   she describes no palpitations or feeling of rapid heart rate.  Her blood pressure is adequate with most recent of 102/56.  She is breathing comfortably on room air.  Will follow with you      I spent 35 minutes in the professional and overall care of this patient.      Jim Malloy, APRN-CNP

## 2024-01-13 NOTE — ANESTHESIA PREPROCEDURE EVALUATION
Patient: Ruth Veloz    Procedure Information       Date/Time: 01/13/24 0830    Procedure: Hip Dislocation Total Closed Reduction,possible open reduction, possible revision (Left: Hip)    Location: [facility] / [facility]    Surgeons: Tirso Medina MD          Past Medical History:   Diagnosis Date    A-fib (CMS/HCC)     GERD (gastroesophageal reflux disease)     Hip pain, acute, left     Osteopenia     Personal history of diseases of the blood and blood-forming organs and certain disorders involving the immune mechanism     History of autoimmune disorder    Personal history of other diseases of the digestive system     History of gastroesophageal reflux (GERD)     Past Surgical History:   Procedure Laterality Date    OTHER SURGICAL HISTORY  12/13/2021    Balloon sinuplasty    SMALL INTESTINE SURGERY  2001    twisted bowel repair         Relevant Problems   Cardiovascular   (+) Aortic valve disease   (+) Aortic valve regurgitation   (+) Mitral regurgitation   (+) Paroxysmal atrial fibrillation (CMS/HCC)   (+) Pure hypercholesterolemia      GI   (+) Gastroesophageal reflux disease      Hematology   (+) Medication induced coagulopathy (CMS/HCC)      Musculoskeletal   (+) Primary osteoarthritis of left hip   (+) Spinal stenosis of lumbar region       Clinical information reviewed:   Tobacco  Allergies  Meds  Problems  Med Hx  Surg Hx   Fam Hx  Soc   Hx        NPO Detail:  No data recorded     Physical Exam    Airway  Mallampati: II  TM distance: >3 FB  Neck ROM: full     Cardiovascular   Comments: Deferred   Dental   Comments: No loose teeth   Pulmonary   Comments: Deferred   Abdominal     Comments: Deferred           Anesthesia Plan    History of general anesthesia?: yes  History of complications of general anesthesia?: no    ASA 3     general     The patient is not a current smoker.  Patient was not previously instructed to abstain from smoking on day of procedure.  Patient did not smoke on day  of procedure.  Education provided regarding risk of obstructive sleep apnea.  intravenous induction   Postoperative administration of opioids is intended.  Anesthetic plan and risks discussed with patient.  Use of blood products discussed with patient who consented to blood products.    Plan discussed with CRNA and CAA.

## 2024-01-13 NOTE — PERIOPERATIVE NURSING NOTE
Report called to floor nurse. Nurse informed of cardiology consult to Dr. Henderson and new telemetry order

## 2024-01-13 NOTE — NURSING NOTE
ASSUMED CARE OF PT NO COMPLAINTS VOICED. PT REMAINS NPO FOR OR TODAY. PT UP TO BSC W/ MIN ASSIST TOLERATED WELL. NWB LLE

## 2024-01-13 NOTE — CARE PLAN
The patient's goals for the shift include  pain control    The clinical goals for the shift include Pain control

## 2024-01-13 NOTE — BRIEF OP NOTE
Date: 2024  OR Location: [facility] OR    Name: Ruth Veloz, : 1950, Age: 73 y.o., MRN: [id number], Sex: female    Diagnosis  Pre-op Diagnosis     * Dislocation of hip joint prosthesis, initial encounter (CMS/[facility]) [T84.029A, Z96.649] Post-op Diagnosis     * Dislocation of hip joint prosthesis, initial encounter (CMS/[facility]) [T84.029A, Z96.649]     Procedures  Hip Dislocation Total Closed Reduction  24767 - [place] CLTX POST HIP ARTHRP DISLC REQ ANES      Surgeons      * Tirso Medina - Primary    Resident/Fellow/Other Assistant:  Surgeon(s) and Role:    Procedure Summary  Anesthesia: Consult  ASA: III  Anesthesia Staff: Anesthesiologist: Joshua Foote DO  C-AA: CYNDIE Russell  Estimated Blood Loss: None  Intra-op Medications:   Medication Name Total Dose   metoprolol tartrate (Lopressor) injection 3 mg 3 mg              Anesthesia Record               Intraprocedure I/O Totals          Intake    LR infusion 300.00 mL    Total Intake 300 mL       Output    Est. Blood Loss 0 mL    Total Output 0 mL       Net    Net Volume 300 mL          Specimen: No specimens collected     Staff:   Circulator: Ashley Multani RN  Scrub Person: Mahnaz Chowdhury  I had a long detailed discussion with the patient in the presence of her  yesterday on the floor and this morning's prior to bring the patient to the operating room.  She has a subluxation of her left total hip replacement.  Attempted closed reduction, possible open reduction and possible revision of subluxated left hip with indications, alternatives, potential risks, benefits, unforeseen risks, the rehabilitation involved and the fact that no guarantee can be made were all discussed with the patient in the presence of her  in detail.  Both on the floor yesterday and just prior to going to the OR this morning the patient understood, accepted the risks and benefits of the above discussed treatment and wished to proceed.  I  agree.        Findings: The patient was taken to the operating room and was placed under general anesthesia without complications.  With the assistance of the surgical assistant doing countertraction I placed straight traction on the hip with it being in slight flexion and gently internally and externally rotated the hip.  I both visibly and palpably saw the hip reduced.  I confirmed reduction on fluoroscopic x-rays.  The hip was reduced.  I then placed the hip through a full range of motion on fluoroscopic x-rays and the hip was stable.  Sequential teds and abduction pillow were again applied and the patient returned to the PACU in stable condition.  After going under anesthesia the anesthesiologist informed me that the heart rate increased in the operating room prior to the reduction but was stable prior to the patient waking from anesthesia.  This patient is being monitored in the PACU and is stable at this time.    Complications:  None; patient tolerated the procedure well.     Disposition: PACU - hemodynamically stable.  Condition: stable  Specimens Collected: No specimens collected  Attending Attestation: I performed the procedure.    Tirso Medina  Phone Number: [phone]

## 2024-01-13 NOTE — PROGRESS NOTES
Ruth Veloz is a 73 y.o. female on day 0 of admission presenting with Primary osteoarthritis of left hip.      Subjective   Was seen sitting up in bed after returning from the OR for closed reduction of her left hip.  He states that she is having pain that is controlled moderately well with the current pain control regimen.  She denies any chest pain or shortness of breath.       Objective     Last Recorded Vitals  /56   Pulse (!) 114   Temp 37.4 °C (99.3 °F) (Oral)   Resp 20   Wt 58 kg (127 lb 13.9 oz)   SpO2 (!) 89%   Intake/Output last 3 Shifts:    Intake/Output Summary (Last 24 hours) at 1/13/2024 1048  Last data filed at 1/13/2024 0914  Gross per 24 hour   Intake 1190 ml   Output 2150 ml   Net -960 ml         Admission Weight  Weight: 58 kg (127 lb 13.9 oz) (01/10/24 1857)    Daily Weight  01/10/24 : 58 kg (127 lb 13.9 oz)    Image Results  FL less than 1 hour  These images are not reportable by radiology and will not be interpreted   by  Radiologists.      Physical Exam  General: alert, no diaphoresis   Lungs: CTA BL   Heart: irregularly irregular, no LE edema BL   GI: abdomen soft, nontender, nondistended, BS present   MSK: no joint effusion or deformity   Skin: no rashes, erythema, or ecchymosis   Neuro: grossly normal cognition, motor strength, sensation    Relevant Results               Assessment/Plan        Urinary retention  -Resolved    Paroxysmal afib  - sotalol, eliquis restarted  - metoprolol prn  -Episode of rapid A-fib in OR, cardiology reconsulted    Arthroplasty of left hip  - management/pain management per Dr. Medina  -Status post left total hip arthroplasty on 1/10/2024  -Closed reduction 1/13/2024    Encourage IS  PT/OT              Alfie Kirkland, APRN-CNP

## 2024-01-13 NOTE — CARE PLAN
The patient's goals for the shift include      The clinical goals for the shift include PAIN CONTROL    Over the shift, the patient did not make progress toward the following goals. Barriers to progression include CLOSED REDUCTION TODAY. Recommendations to address these barriers include OR TIME.

## 2024-01-13 NOTE — PROGRESS NOTES
Physical Therapy                 Therapy Communication Note    Patient Name: Ruth Veloz  MRN: [id number]  Today's Date: 1/13/2024     Discipline: Physical Therapy    Missed Visit Reason: Missed Visit Reason: Patient placed on medical hold (Pt will require new PT order after surgery)    Missed Time: Cancel    Comment:

## 2024-01-13 NOTE — NURSING NOTE
Pt resting in bed. No complaints or concerns. Call light within reach.  Pt to be npo at midnight for procedure.  End of shift order review completed.

## 2024-01-13 NOTE — NURSING NOTE
Patient very pleasant and cooperative. NPO since midnight. Medicated with morphine as requested. No further needs. Call light within reach. Bed alarm in use.

## 2024-01-14 ENCOUNTER — APPOINTMENT (OUTPATIENT)
Dept: CARDIOLOGY | Facility: HOSPITAL | Age: 74
DRG: 470 | End: 2024-01-14
Payer: MEDICARE

## 2024-01-14 LAB
ANION GAP SERPL CALC-SCNC: 8 MMOL/L
BUN SERPL-MCNC: 9 MG/DL (ref 8–25)
CALCIUM SERPL-MCNC: 8.4 MG/DL (ref 8.5–10.4)
CHLORIDE SERPL-SCNC: 103 MMOL/L (ref 97–107)
CO2 SERPL-SCNC: 27 MMOL/L (ref 24–31)
CREAT SERPL-MCNC: 0.6 MG/DL (ref 0.4–1.6)
EGFRCR SERPLBLD CKD-EPI 2021: >90 ML/MIN/1.73M*2
ERYTHROCYTE [DISTWIDTH] IN BLOOD BY AUTOMATED COUNT: 14 % (ref 11.5–14.5)
GLUCOSE SERPL-MCNC: 90 MG/DL (ref 65–99)
HCT VFR BLD AUTO: 32 % (ref 36–46)
HGB BLD-MCNC: 10.1 G/DL (ref 12–16)
MCH RBC QN AUTO: 29.8 PG (ref 26–34)
MCHC RBC AUTO-ENTMCNC: 31.6 G/DL (ref 32–36)
MCV RBC AUTO: 94 FL (ref 80–100)
NRBC BLD-RTO: 0 /100 WBCS (ref 0–0)
PLATELET # BLD AUTO: 235 X10*3/UL (ref 150–450)
POTASSIUM SERPL-SCNC: 3.7 MMOL/L (ref 3.4–5.1)
RBC # BLD AUTO: 3.39 X10*6/UL (ref 4–5.2)
SODIUM SERPL-SCNC: 138 MMOL/L (ref 133–145)
WBC # BLD AUTO: 6.5 X10*3/UL (ref 4.4–11.3)

## 2024-01-14 PROCEDURE — 36415 COLL VENOUS BLD VENIPUNCTURE: CPT | Performed by: ORTHOPAEDIC SURGERY

## 2024-01-14 PROCEDURE — 2500000001 HC RX 250 WO HCPCS SELF ADMINISTERED DRUGS (ALT 637 FOR MEDICARE OP): Performed by: NURSE PRACTITIONER

## 2024-01-14 PROCEDURE — 2500000004 HC RX 250 GENERAL PHARMACY W/ HCPCS (ALT 636 FOR OP/ED): Performed by: ORTHOPAEDIC SURGERY

## 2024-01-14 PROCEDURE — 93005 ELECTROCARDIOGRAM TRACING: CPT

## 2024-01-14 PROCEDURE — 1200000002 HC GENERAL ROOM WITH TELEMETRY DAILY

## 2024-01-14 PROCEDURE — 97530 THERAPEUTIC ACTIVITIES: CPT | Mod: GP

## 2024-01-14 PROCEDURE — 85027 COMPLETE CBC AUTOMATED: CPT | Performed by: ORTHOPAEDIC SURGERY

## 2024-01-14 PROCEDURE — 82374 ASSAY BLOOD CARBON DIOXIDE: CPT | Performed by: ORTHOPAEDIC SURGERY

## 2024-01-14 PROCEDURE — 2500000001 HC RX 250 WO HCPCS SELF ADMINISTERED DRUGS (ALT 637 FOR MEDICARE OP): Performed by: ORTHOPAEDIC SURGERY

## 2024-01-14 PROCEDURE — 2500000004 HC RX 250 GENERAL PHARMACY W/ HCPCS (ALT 636 FOR OP/ED): Performed by: NURSE PRACTITIONER

## 2024-01-14 PROCEDURE — 97110 THERAPEUTIC EXERCISES: CPT | Mod: GP

## 2024-01-14 PROCEDURE — 93010 ELECTROCARDIOGRAM REPORT: CPT | Performed by: INTERNAL MEDICINE

## 2024-01-14 PROCEDURE — 97116 GAIT TRAINING THERAPY: CPT | Mod: GP

## 2024-01-14 PROCEDURE — 99232 SBSQ HOSP IP/OBS MODERATE 35: CPT | Performed by: NURSE PRACTITIONER

## 2024-01-14 RX ORDER — DIGOXIN 0.25 MG/ML
125 INJECTION INTRAMUSCULAR; INTRAVENOUS ONCE
Status: COMPLETED | OUTPATIENT
Start: 2024-01-14 | End: 2024-01-14

## 2024-01-14 RX ADMIN — OXYCODONE AND ACETAMINOPHEN 1 TABLET: 325; 5 TABLET ORAL at 16:33

## 2024-01-14 RX ADMIN — DIGOXIN 125 MCG: 0.25 INJECTION INTRAMUSCULAR; INTRAVENOUS at 16:00

## 2024-01-14 RX ADMIN — SOTALOL HYDROCHLORIDE 40 MG: 80 TABLET ORAL at 08:43

## 2024-01-14 RX ADMIN — GABAPENTIN 200 MG: 100 CAPSULE ORAL at 20:44

## 2024-01-14 RX ADMIN — OXYCODONE AND ACETAMINOPHEN 1 TABLET: 325; 5 TABLET ORAL at 03:53

## 2024-01-14 RX ADMIN — SOTALOL HYDROCHLORIDE 80 MG: 80 TABLET ORAL at 20:43

## 2024-01-14 RX ADMIN — APIXABAN 2.5 MG: 2.5 TABLET, FILM COATED ORAL at 20:43

## 2024-01-14 RX ADMIN — POLYETHYLENE GLYCOL 3350 17 G: 17 POWDER, FOR SOLUTION ORAL at 08:43

## 2024-01-14 RX ADMIN — ACETAMINOPHEN 650 MG: 325 TABLET ORAL at 23:39

## 2024-01-14 RX ADMIN — DIGOXIN 250 MCG: 0.25 INJECTION INTRAMUSCULAR; INTRAVENOUS at 08:55

## 2024-01-14 RX ADMIN — FAMOTIDINE 20 MG: 20 TABLET ORAL at 20:43

## 2024-01-14 RX ADMIN — APIXABAN 2.5 MG: 2.5 TABLET, FILM COATED ORAL at 08:38

## 2024-01-14 RX ADMIN — METOPROLOL TARTRATE 25 MG: 25 TABLET, FILM COATED ORAL at 12:25

## 2024-01-14 RX ADMIN — OXYCODONE AND ACETAMINOPHEN 1 TABLET: 325; 5 TABLET ORAL at 20:44

## 2024-01-14 RX ADMIN — FAMOTIDINE 20 MG: 20 TABLET ORAL at 08:37

## 2024-01-14 RX ADMIN — OXYCODONE AND ACETAMINOPHEN 1 TABLET: 325; 5 TABLET ORAL at 08:37

## 2024-01-14 RX ADMIN — OXYCODONE AND ACETAMINOPHEN 1 TABLET: 325; 5 TABLET ORAL at 12:20

## 2024-01-14 ASSESSMENT — COGNITIVE AND FUNCTIONAL STATUS - GENERAL
DRESSING REGULAR UPPER BODY CLOTHING: A LITTLE
MOVING TO AND FROM BED TO CHAIR: A LITTLE
MOVING TO AND FROM BED TO CHAIR: A LITTLE
MOBILITY SCORE: 16
TURNING FROM BACK TO SIDE WHILE IN FLAT BAD: A LITTLE
HELP NEEDED FOR BATHING: A LITTLE
PERSONAL GROOMING: A LITTLE
DAILY ACTIVITIY SCORE: 19
TOILETING: A LITTLE
DRESSING REGULAR LOWER BODY CLOTHING: A LITTLE
CLIMB 3 TO 5 STEPS WITH RAILING: TOTAL
MOVING FROM LYING ON BACK TO SITTING ON SIDE OF FLAT BED WITH BEDRAILS: A LITTLE
STANDING UP FROM CHAIR USING ARMS: A LITTLE
WALKING IN HOSPITAL ROOM: A LITTLE
TURNING FROM BACK TO SIDE WHILE IN FLAT BAD: A LITTLE
MOBILITY SCORE: 17
WALKING IN HOSPITAL ROOM: A LITTLE
CLIMB 3 TO 5 STEPS WITH RAILING: TOTAL
STANDING UP FROM CHAIR USING ARMS: A LITTLE

## 2024-01-14 ASSESSMENT — PAIN SCALES - GENERAL
PAINLEVEL_OUTOF10: 3
PAINLEVEL_OUTOF10: 5 - MODERATE PAIN
PAINLEVEL_OUTOF10: 6
PAINLEVEL_OUTOF10: 5 - MODERATE PAIN
PAINLEVEL_OUTOF10: 6
PAINLEVEL_OUTOF10: 0 - NO PAIN
PAINLEVEL_OUTOF10: 0 - NO PAIN
PAINLEVEL_OUTOF10: 2
PAINLEVEL_OUTOF10: 3

## 2024-01-14 ASSESSMENT — PAIN DESCRIPTION - ORIENTATION
ORIENTATION: LEFT

## 2024-01-14 ASSESSMENT — PAIN - FUNCTIONAL ASSESSMENT
PAIN_FUNCTIONAL_ASSESSMENT: 0-10
PAIN_FUNCTIONAL_ASSESSMENT: WONG-BAKER FACES
PAIN_FUNCTIONAL_ASSESSMENT: 0-10
PAIN_FUNCTIONAL_ASSESSMENT: 0-10

## 2024-01-14 ASSESSMENT — PAIN DESCRIPTION - LOCATION
LOCATION: HIP

## 2024-01-14 NOTE — PROGRESS NOTES
"Ruth Veloz is a 73 y.o. female on day 4 of admission presenting with Primary osteoarthritis of left hip.    Subjective   Alert and oriented x 3, denies complaints chest pain or pressure, palpitations feeling rapid heart rate, remains in a rapid atrial fibrillation/flutter       Objective     Physical Exam  Constitutional:       General: She is not in acute distress.     Appearance: Normal appearance. She is not ill-appearing or toxic-appearing.   HENT:      Head: Normocephalic and atraumatic.      Nose: Nose normal.      Mouth/Throat:      Mouth: Mucous membranes are moist.   Cardiovascular:      Rate and Rhythm: Tachycardia present. Rhythm irregular.      Pulses: Normal pulses.      Heart sounds: Normal heart sounds. No murmur heard.     No friction rub. No gallop.   Pulmonary:      Effort: Pulmonary effort is normal.      Breath sounds: Normal breath sounds.   Abdominal:      General: Abdomen is flat. Bowel sounds are normal.      Palpations: Abdomen is soft.   Musculoskeletal:      Cervical back: Normal range of motion.      Right lower leg: No edema.      Left lower leg: No edema.      Comments: Abductor pillow noted   Skin:     General: Skin is warm and dry.      Capillary Refill: Capillary refill takes less than 2 seconds.   Neurological:      Mental Status: She is alert and oriented to person, place, and time.   Psychiatric:         Mood and Affect: Mood normal.         Behavior: Behavior normal.         Thought Content: Thought content normal.         Judgment: Judgment normal.         Last Recorded Vitals  Blood pressure 127/80, pulse 105, temperature 36.5 °C (97.7 °F), temperature source Oral, resp. rate 17, height 1.549 m (5' 0.98\"), weight 58 kg (127 lb 13.9 oz), SpO2 94 %.  Intake/Output last 3 Shifts:  I/O last 3 completed shifts:  In: 1140 (19.7 mL/kg) [P.O.:840; I.V.:300 (5.2 mL/kg)]  Out: 2250 (38.8 mL/kg) [Urine:2250 (1.1 mL/kg/hr)]  Weight: 58 kg     Relevant Results  Results for orders " placed or performed during the hospital encounter of 01/10/24 (from the past 24 hour(s))   CBC   Result Value Ref Range    WBC 6.5 4.4 - 11.3 x10*3/uL    nRBC 0.0 0.0 - 0.0 /100 WBCs    RBC 3.39 (L) 4.00 - 5.20 x10*6/uL    Hemoglobin 10.1 (L) 12.0 - 16.0 g/dL    Hematocrit 32.0 (L) 36.0 - 46.0 %    MCV 94 80 - 100 fL    MCH 29.8 26.0 - 34.0 pg    MCHC 31.6 (L) 32.0 - 36.0 g/dL    RDW 14.0 11.5 - 14.5 %    Platelets 235 150 - 450 x10*3/uL   Basic metabolic panel   Result Value Ref Range    Glucose 90 65 - 99 mg/dL    Sodium 138 133 - 145 mmol/L    Potassium 3.7 3.4 - 5.1 mmol/L    Chloride 103 97 - 107 mmol/L    Bicarbonate 27 24 - 31 mmol/L    Urea Nitrogen 9 8 - 25 mg/dL    Creatinine 0.60 0.40 - 1.60 mg/dL    eGFR >90 >60 mL/min/1.73m*2    Calcium 8.4 (L) 8.5 - 10.4 mg/dL    Anion Gap 8 <=19 mmol/L       Assessment/Plan   Principal Problem:    Primary osteoarthritis of left hip  Active Problems:    Aortic valve regurgitation    Gastroesophageal reflux disease    Paroxysmal atrial fibrillation (CMS/HCC)    Pure hypercholesterolemia    Mitral regurgitation    Dislocation of hip joint prosthesis, initial encounter (CMS/[facility])    Dislocation of hip prosthesis (CMS/[facility])    Status post right total hip repair  Paroxysmal atrial fibrillation  Rapid atrial fibrillation  GERD  Dislocation of hip prosthesis     Overall impression:     1/11: As above, presents for scheduled left total hip repair.  Successful procedure.  Postoperatively patient did have some episodes of rapid atrial fibrillation.  She does have a known atrial fibrillation.  Her sotalol was placed on hold for procedure, this may have exacerbated her atrial fibrillation in combination with the procedure.  Time my assessment patient is rate controlled with heart rate in the 90s.  She will resume her sotalol this morning.  She does have available as needed metoprolol if necessary for further rate control.  Given she is now in atrial fibrillation would like to resume  her home Eliquis today, surgical team is in agreement with this.  Otherwise patient is chest pain-free and euvolemic.  Breathing comfortably on room air with current pulse ox of 99%.  Overall stable from a cardiac perspective for discharge once cleared by Ortho.  Will sign off.  Patient to follow-up with Dr. Watts in the outpatient setting per current outpatient schedule.      1/13: Reconsulted for atrial fibrillation intraoperatively, patient had a spontaneous dislocation of hip prosthesis, she was taken to surgery this morning where she underwent a successful closed reduction.  During procedure after propofol patient was noted to return to a rapid atrial fibrillation.  Likely relative to her acute condition as well as with holding her sotalol this morning.  Patient will receive 40 mg of extraskeletal this morning for a total of 80 mg in the morning and 80 mg in the evening.  Give her 1 dose of IV digoxin.  Will check a EKG to reassess her Qtc/JT noting she is in atrial fibrillation.  Additionally she has been placed on telemetry monitor for further monitoring.  Tomorrow if rate controlled she will revert back to her home dosing of sotalol which is 40 mg in the morning and 80 mg at night.  On assessment she is euvolemic and chest pain-free.   she describes no palpitations or feeling of rapid heart rate.  Her blood pressure is adequate with most recent of 102/56.  She is breathing comfortably on room air.  Will follow with you    1/14: No significant changes over the past 24 hours.  Remains in a rapid atrial fibrillation/flutter.  She does continue to take her sotalol.  Will give her further IV digoxin.  Blood pressure remained stable with most recent of 127/80.  Room air pulse ox 94%.  She is euvolemic on examination and chest pain-free.  Creatinine stable at 0.6.  Hemoglobin stable 10.1.  Ortho is planning on discharging patient home tomorrow.  Daily the patient will achieve rate control by then.  I have  recommended the patient follow-up electrophysiology in the outpatient setting.      I spent 35 minutes in the professional and overall care of this patient.      Jim Malloy, GRACIE-CNP

## 2024-01-14 NOTE — PROGRESS NOTES
"Ruth Veloz is a 73 y.o. female on day 1 of admission presenting with Primary osteoarthritis of left hip.    Subjective   The patient is seen resting comfortably in bed.  He does not have any chest pain or shortness of breath.       Objective     Physical Exam  General: I evaluated this patient in the presence of her  and the physical therapist.  The physical therapist informed me that the patient was able to transfer out of bed and walk with a walker with supervision.  She was able to get back into bed.  She is now resting comfortably in bed.    Left hip and lower extremity: Position is clinically satisfactory.  Dressing is dry.  Left lower extremity motor and sensory examinations are intact.  The patient is able to actively dorsiflex and plantarflex her left foot.  Thigh are nontender and nonswollen.  I again reviewed the intraoperative fluoroscopy results which showed that the previously noted subluxation of the left hip has been reduced.  All labs are reviewed.  Last Recorded Vitals  Blood pressure 127/80, pulse 105, temperature 36.5 °C (97.7 °F), temperature source Oral, resp. rate 17, height 1.549 m (5' 0.98\"), weight 58 kg (127 lb 13.9 oz), SpO2 94 %.  Intake/Output last 3 Shifts:  I/O last 3 completed shifts:  In: 1140 (19.7 mL/kg) [P.O.:840; I.V.:300 (5.2 mL/kg)]  Out: 2250 (38.8 mL/kg) [Urine:2250 (1.1 mL/kg/hr)]  Weight: 58 kg     Relevant Results      Scheduled medications  acetaminophen, 650 mg, oral, q6h NEHEMIAS  apixaban, 2.5 mg, oral, q12h NEHEMIAS  digoxin, 250 mcg, intravenous, Daily  famotidine, 20 mg, oral, BID  gabapentin, 200 mg, oral, Nightly  polyethylene glycol, 17 g, oral, Daily  sotalol, 40 mg, oral, Daily  sotalol, 80 mg, oral, Daily      Continuous medications  oxygen, 2 L/min, Last Rate: 2 L/min (01/10/24 1815)  oxygen, 2 L/min, Last Rate: Stopped (01/13/24 1100)      PRN medications  PRN medications: acetaminophen, metoprolol tartrate, morphine, naloxone, " oxyCODONE-acetaminophen  Results for orders placed or performed during the hospital encounter of 01/10/24 (from the past 24 hour(s))   CBC   Result Value Ref Range    WBC 6.5 4.4 - 11.3 x10*3/uL    nRBC 0.0 0.0 - 0.0 /100 WBCs    RBC 3.39 (L) 4.00 - 5.20 x10*6/uL    Hemoglobin 10.1 (L) 12.0 - 16.0 g/dL    Hematocrit 32.0 (L) 36.0 - 46.0 %    MCV 94 80 - 100 fL    MCH 29.8 26.0 - 34.0 pg    MCHC 31.6 (L) 32.0 - 36.0 g/dL    RDW 14.0 11.5 - 14.5 %    Platelets 235 150 - 450 x10*3/uL   Basic metabolic panel   Result Value Ref Range    Glucose 90 65 - 99 mg/dL    Sodium 138 133 - 145 mmol/L    Potassium 3.7 3.4 - 5.1 mmol/L    Chloride 103 97 - 107 mmol/L    Bicarbonate 27 24 - 31 mmol/L    Urea Nitrogen 9 8 - 25 mg/dL    Creatinine 0.60 0.40 - 1.60 mg/dL    eGFR >90 >60 mL/min/1.73m*2    Calcium 8.4 (L) 8.5 - 10.4 mg/dL    Anion Gap 8 <=19 mmol/L                            Assessment/Plan   Principal Problem:    Primary osteoarthritis of left hip  Active Problems:    Aortic valve regurgitation    Gastroesophageal reflux disease    Paroxysmal atrial fibrillation (CMS/[facility])    Pure hypercholesterolemia    Mitral regurgitation    Dislocation of hip prosthesis (CMS/[facility])    Dislocation of hip joint prosthesis, initial encounter (CMS/[facility])    Assessment: #1) status post left total hip replacement #2) status post closed reduction of subluxation of left hip prosthesis    Plan: Options are discussed with the patient in detail in the presence of her .  We will continue with physical therapy.  At this time the plan continues to be for her to be discharged home with home physical therapy and home health, hopefully tomorrow after evaluated further by physical therapy.       I spent 30 minutes in the professional and overall care of this patient today.      Tirso Medina MD

## 2024-01-14 NOTE — NURSING NOTE
Patient laying in bed with eyes closed. EKG completed as ordered reading thomas. . Patient with ice pack to left hip. No needs verbalized. Call light within reach.

## 2024-01-14 NOTE — PROGRESS NOTES
Ruth Veloz is a 73 y.o. female on day 4 of admission presenting with Primary osteoarthritis of left hip.      Subjective   Patient examined after working with physical therapy.  She stated that she would like to go home with home health care and the physical therapist and myself told her the advantages of going to rehab first and she thinks that she might make the decision to go to rehab now.  She is doing well and denies any pain       Objective     Last Recorded Vitals  /80 (BP Location: Right arm, Patient Position: Lying)   Pulse 105   Temp 36.5 °C (97.7 °F) (Oral)   Resp 17   Wt 58 kg (127 lb 13.9 oz)   SpO2 94%   Intake/Output last 3 Shifts:    Intake/Output Summary (Last 24 hours) at 1/14/2024 0958  Last data filed at 1/14/2024 0818  Gross per 24 hour   Intake 600 ml   Output 1100 ml   Net -500 ml         Admission Weight  Weight: 58 kg (127 lb 13.9 oz) (01/10/24 1857)    Daily Weight  01/10/24 : 58 kg (127 lb 13.9 oz)    Image Results  FL less than 1 hour  These images are not reportable by radiology and will not be interpreted   by  Radiologists.      Physical Exam  General: alert, calm, cooperative   Lungs: CTA BL   Heart: irregularly irregular, no LE edema BL   GI: abdomen soft, nontender, nondistended, BS present   MSK: no joint effusion or deformity   Skin: no rashes, erythema, or ecchymosis   Neuro: grossly normal cognition, motor strength, sensation           Assessment/Plan        Urinary retention  -Resolved    Paroxysmal afib  - sotalol, eliquis restarted  - metoprolol prn  -Cardiology following    Arthroplasty of left hip  - management/pain management per Dr. Medina  -Status post left total hip arthroplasty on 1/10/2024  -Closed reduction 1/13/2024    Encourage IS  PT/OT              GRACIE Belle-CNP

## 2024-01-14 NOTE — PROGRESS NOTES
Physical Therapy    Physical Therapy Treatment    Patient Name: Ruth Veloz  MRN: [id number]  Today's Date: 1/14/2024  Time Calculation  Start Time: 0920  Stop Time: 1000  Time Calculation (min): 40 min       Assessment/Plan   PT Assessment  PT Assessment Results: Decreased strength, Decreased range of motion, Decreased endurance, Impaired balance, Decreased mobility, Decreased coordination, Orthopedic restrictions, Pain  Rehab Prognosis: Good  Evaluation/Treatment Tolerance: Patient limited by pain, Patient limited by fatigue  Medical Staff Made Aware: Yes  End of Session Communication: Bedside nurse, Physician (Dr. Medina)  Assessment Comment: Patient presents with improved tolerance to mobility with good pain control L hip s/p closed reduction for lateral CORINNE subluxation.  Adheres to all precautions with good understanding of mobility and safety to avoid subluxation recurrance.  Continues to require moderate assist for bed mobility.  Now CGA/Min A for tranfers and gait. Will continue to benefit from aggressive inpatient PT.  End of Session Patient Position: Bed, 2 rail up (All needs wihtin reach. ABD pillow and SCD's placed)  PT Plan  Inpatient/Swing Bed or Outpatient: Inpatient  PT Plan  Treatment/Interventions: Bed mobility, Transfer training, Gait training, Stair training, Neuromuscular re-education, Neurodevelopmental intervention, Strengthening, Balance training, Endurance training, Range of motion, Therapeutic exercise, Therapeutic activity, Home exercise program, Postural re-education  PT Plan: Skilled PT  PT Frequency: BID  PT Discharge Recommendations: Moderate intensity level of continued care  Equipment Recommended upon Discharge: Wheeled walker  PT Recommended Transfer Status: Assist x1  PT - OK to Discharge: Yes      General Visit Information:   PT  Visit  PT Received On: 01/14/24  Response to Previous Treatment: Not applicable  General  Reason for Referral: s/p LTHA then closed reduction of  lateral sublux 1/12  Referred By: Dr. Medina  Past Medical History Relevant to Rehab: A-fib, GERD, OP, AVD, R shoulder bursitis, cervicalgia, spinal stenosis, small intensitve surgery  Prior to Session Communication: Bedside nurse  Patient Position Received: Bed, 2 rail up  General Comment: New orders recieved 1/13 s/p closed reduction of L hip.  RN cleared for PT evaluation 1/14.  Patient agreeable.    Subjective   Precautions:     Vital Signs:       Objective   Pain:  Pain Assessment  Pain Assessment: 0-10  Pain Score: 2  Pain Type: Acute pain  Pain Location: Hip  Pain Orientation: Left  Pain Interventions: Medication (See MAR) (Medicated prior to PT session)  Cognition:  Cognition  Overall Cognitive Status: Within Functional Limits  Postural Control:  Postural Control  Postural Control: Within Functional Limits  Trunk Control: WFL  Static Sitting Balance  Static Sitting-Balance Support: Bilateral upper extremity supported  Static Standing Balance  Static Standing-Balance Support: Bilateral upper extremity supported  Dynamic Standing Balance  Dynamic Standing-Balance Support: Bilateral upper extremity supported  Dynamic Standing-Comments: Use of RW  Extremity/Trunk Assessments:  LLE   LLE : Exceptions to WFL  Strength LLE  LLE Overall Strength: Due to pain, Due to  precautions, Deficits  L Hip Flexion: 2-/5  L Hip Extension: 2/5  L Hip ABduction: 2-/5  L Hip ADduction:  (Not examined)  Activity Tolerance:  Activity Tolerance  Endurance: Decreased tolerance for upright activites  Activity Tolerance Comments: Good pain control  Treatments:  Therapeutic Exercise  Therapeutic Exercise Activity 1: B/L LE ankle pumps, quad sets, glute sets, hip ABD with assist x 10    Therapeutic Activity  Therapeutic Activity Performed: Yes  Therapeutic Activity 1: See Bed mobility and Transfer training (( x 15 minutes))    Bed Mobility 1  Bed Mobility 1: Supine to sitting  Level of Assistance 1: Moderate assistance  Bed Mobility  Comments 1: Assist to B/L LE's  Bed Mobility 2  Bed Mobility  2: Sitting to supine  Level of Assistance 2: Moderate assistance  Bed Mobility Comments 2: Assist to B/L LE's    Ambulation/Gait Training  Ambulation/Gait Training Performed: Yes  Ambulation/Gait Training 1  Surface 1: Level tile  Device 1: Rolling walker  Assistance 1: Contact guard  Quality of Gait 1: Narrow base of support, Decreased step length, Antalgic  Comments/Distance (ft) 1: 10' x 2 with use of 2WW and CGA via PT.  Cues for LE spacing, LLE lead and avoidance of pivot LLE.  Transfers  Transfer: Yes  Transfer 1  Transfer From 1: Sit to  Transfer to 1: Stand  Technique 1: Sit to stand  Transfer Device 1: Walker  Transfer Level of Assistance 1: Minimum assistance  Trials/Comments 1: Verbal cues for LLE position  Transfers 2  Transfer From 2: Stand to  Transfer to 2: Sit  Technique 2: Stand to sit  Transfer Device 2: Walker  Transfer Level of Assistance 2: Minimum assistance  Trials/Comments 2: Verbal cues for LLE position    Outcome Measures:  [facility] Basic Mobility  Turning from your back to your side while in a flat bed without using bedrails: A little  Moving from lying on your back to sitting on the side of a flat bed without using bedrails: A little  Moving to and from bed to chair (including a wheelchair): A little  Standing up from a chair using your arms (e.g. wheelchair or bedside chair): A little  To walk in hospital room: A little  Climbing 3-5 steps with railing: Total  Basic Mobility - Total Score: 16    Education Documentation  Precautions, taught by Anand Gardner PT at 1/14/2024 10:22 AM.  Learner: Significant Other, Patient  Readiness: Eager  Method: Explanation, Demonstration  Response: Verbalizes Understanding    Home Exercise Program, taught by Anand Gardner PT at 1/14/2024 10:22 AM.  Learner: Significant Other, Patient  Readiness: Eager  Method: Explanation, Demonstration  Response: Verbalizes Understanding    Mobility  Training, taught by Anand Gardner, PT at 1/14/2024 10:22 AM.  Learner: Significant Other, Patient  Readiness: Eager  Method: Explanation, Demonstration  Response: Verbalizes Understanding    Education Comments  No comments found.        OP EDUCATION:       Encounter Problems       Encounter Problems (Active)       Mobility       Bed mobility including supine to sit and sit to supine with supervision. (Progressing)       Start:  01/11/24    Expected End:  01/25/24            Ambulate 60 feet with rolling walker and supervision. (Progressing)       Start:  01/11/24    Expected End:  01/25/24            Negotiate 3 steps with single handrail +/- straight cane and min assist. (Progressing)       Start:  01/11/24    Expected End:  01/25/24               Pain - Adult          Transfers       Transfers including sit to stand and stand to sit with supervision. (Progressing)       Start:  01/11/24    Expected End:  01/25/24

## 2024-01-15 ENCOUNTER — APPOINTMENT (OUTPATIENT)
Dept: CARDIOLOGY | Facility: HOSPITAL | Age: 74
DRG: 470 | End: 2024-01-15
Payer: MEDICARE

## 2024-01-15 LAB
ANION GAP SERPL CALC-SCNC: 8 MMOL/L
ATRIAL RATE: 340 BPM
BUN SERPL-MCNC: 9 MG/DL (ref 8–25)
CALCIUM SERPL-MCNC: 8.4 MG/DL (ref 8.5–10.4)
CHLORIDE SERPL-SCNC: 103 MMOL/L (ref 97–107)
CO2 SERPL-SCNC: 26 MMOL/L (ref 24–31)
CREAT SERPL-MCNC: 0.5 MG/DL (ref 0.4–1.6)
EGFRCR SERPLBLD CKD-EPI 2021: >90 ML/MIN/1.73M*2
ERYTHROCYTE [DISTWIDTH] IN BLOOD BY AUTOMATED COUNT: 13.5 % (ref 11.5–14.5)
GLUCOSE SERPL-MCNC: 97 MG/DL (ref 65–99)
HCT VFR BLD AUTO: 33.4 % (ref 36–46)
HGB BLD-MCNC: 10.4 G/DL (ref 12–16)
MCH RBC QN AUTO: 29.2 PG (ref 26–34)
MCHC RBC AUTO-ENTMCNC: 31.1 G/DL (ref 32–36)
MCV RBC AUTO: 94 FL (ref 80–100)
NRBC BLD-RTO: 0 /100 WBCS (ref 0–0)
PLATELET # BLD AUTO: 263 X10*3/UL (ref 150–450)
POTASSIUM SERPL-SCNC: 3.9 MMOL/L (ref 3.4–5.1)
Q ONSET: 221 MS
QRS COUNT: 17 BEATS
QRS DURATION: 78 MS
QT INTERVAL: 376 MS
QTC CALCULATION(BAZETT): 499 MS
QTC FREDERICIA: 454 MS
R AXIS: 68 DEGREES
RBC # BLD AUTO: 3.56 X10*6/UL (ref 4–5.2)
SODIUM SERPL-SCNC: 137 MMOL/L (ref 133–145)
T AXIS: 109 DEGREES
T OFFSET: 409 MS
VENTRICULAR RATE: 106 BPM
WBC # BLD AUTO: 5.3 X10*3/UL (ref 4.4–11.3)

## 2024-01-15 PROCEDURE — 80048 BASIC METABOLIC PNL TOTAL CA: CPT | Performed by: NURSE PRACTITIONER

## 2024-01-15 PROCEDURE — 85027 COMPLETE CBC AUTOMATED: CPT | Performed by: NURSE PRACTITIONER

## 2024-01-15 PROCEDURE — 99232 SBSQ HOSP IP/OBS MODERATE 35: CPT | Performed by: NURSE PRACTITIONER

## 2024-01-15 PROCEDURE — 2500000001 HC RX 250 WO HCPCS SELF ADMINISTERED DRUGS (ALT 637 FOR MEDICARE OP): Performed by: ORTHOPAEDIC SURGERY

## 2024-01-15 PROCEDURE — 97110 THERAPEUTIC EXERCISES: CPT | Mod: GP,CQ

## 2024-01-15 PROCEDURE — 97530 THERAPEUTIC ACTIVITIES: CPT | Mod: GP,CQ

## 2024-01-15 PROCEDURE — 97116 GAIT TRAINING THERAPY: CPT | Mod: GP,CQ

## 2024-01-15 PROCEDURE — 36415 COLL VENOUS BLD VENIPUNCTURE: CPT | Performed by: NURSE PRACTITIONER

## 2024-01-15 PROCEDURE — 93005 ELECTROCARDIOGRAM TRACING: CPT

## 2024-01-15 PROCEDURE — 2500000001 HC RX 250 WO HCPCS SELF ADMINISTERED DRUGS (ALT 637 FOR MEDICARE OP): Performed by: NURSE PRACTITIONER

## 2024-01-15 PROCEDURE — 2500000004 HC RX 250 GENERAL PHARMACY W/ HCPCS (ALT 636 FOR OP/ED): Performed by: ORTHOPAEDIC SURGERY

## 2024-01-15 PROCEDURE — 97168 OT RE-EVAL EST PLAN CARE: CPT | Mod: GO

## 2024-01-15 PROCEDURE — 97535 SELF CARE MNGMENT TRAINING: CPT | Mod: GO

## 2024-01-15 PROCEDURE — 1200000002 HC GENERAL ROOM WITH TELEMETRY DAILY

## 2024-01-15 PROCEDURE — 2500000004 HC RX 250 GENERAL PHARMACY W/ HCPCS (ALT 636 FOR OP/ED): Performed by: NURSE PRACTITIONER

## 2024-01-15 PROCEDURE — 93010 ELECTROCARDIOGRAM REPORT: CPT | Performed by: INTERNAL MEDICINE

## 2024-01-15 RX ORDER — DIGOXIN 125 MCG
125 TABLET ORAL DAILY
Status: DISCONTINUED | OUTPATIENT
Start: 2024-01-16 | End: 2024-01-16 | Stop reason: HOSPADM

## 2024-01-15 RX ADMIN — OXYCODONE AND ACETAMINOPHEN 1 TABLET: 325; 5 TABLET ORAL at 20:55

## 2024-01-15 RX ADMIN — FAMOTIDINE 20 MG: 20 TABLET ORAL at 20:52

## 2024-01-15 RX ADMIN — SOTALOL HYDROCHLORIDE 40 MG: 80 TABLET ORAL at 08:12

## 2024-01-15 RX ADMIN — OXYCODONE AND ACETAMINOPHEN 1 TABLET: 325; 5 TABLET ORAL at 08:19

## 2024-01-15 RX ADMIN — IRON SUCROSE 100 MG: 20 INJECTION, SOLUTION INTRAVENOUS at 17:36

## 2024-01-15 RX ADMIN — SOTALOL HYDROCHLORIDE 80 MG: 80 TABLET ORAL at 20:52

## 2024-01-15 RX ADMIN — ACETAMINOPHEN 650 MG: 325 TABLET ORAL at 23:00

## 2024-01-15 RX ADMIN — OXYCODONE AND ACETAMINOPHEN 1 TABLET: 325; 5 TABLET ORAL at 16:05

## 2024-01-15 RX ADMIN — ACETAMINOPHEN 650 MG: 325 TABLET ORAL at 12:03

## 2024-01-15 RX ADMIN — DIGOXIN 250 MCG: 0.25 INJECTION INTRAMUSCULAR; INTRAVENOUS at 08:12

## 2024-01-15 RX ADMIN — APIXABAN 2.5 MG: 2.5 TABLET, FILM COATED ORAL at 08:12

## 2024-01-15 RX ADMIN — ACETAMINOPHEN 650 MG: 325 TABLET ORAL at 17:36

## 2024-01-15 RX ADMIN — GABAPENTIN 200 MG: 100 CAPSULE ORAL at 20:52

## 2024-01-15 RX ADMIN — FAMOTIDINE 20 MG: 20 TABLET ORAL at 08:12

## 2024-01-15 RX ADMIN — APIXABAN 2.5 MG: 2.5 TABLET, FILM COATED ORAL at 20:52

## 2024-01-15 RX ADMIN — OXYCODONE AND ACETAMINOPHEN 1 TABLET: 325; 5 TABLET ORAL at 12:03

## 2024-01-15 RX ADMIN — POLYETHYLENE GLYCOL 3350 17 G: 17 POWDER, FOR SOLUTION ORAL at 08:12

## 2024-01-15 RX ADMIN — ACETAMINOPHEN 650 MG: 325 TABLET ORAL at 05:53

## 2024-01-15 ASSESSMENT — COGNITIVE AND FUNCTIONAL STATUS - GENERAL
STANDING UP FROM CHAIR USING ARMS: A LITTLE
HELP NEEDED FOR BATHING: A LOT
DAILY ACTIVITIY SCORE: 17
TURNING FROM BACK TO SIDE WHILE IN FLAT BAD: A LITTLE
TOILETING: A LITTLE
TOILETING: A LITTLE
STANDING UP FROM CHAIR USING ARMS: A LITTLE
CLIMB 3 TO 5 STEPS WITH RAILING: TOTAL
PERSONAL GROOMING: A LITTLE
WALKING IN HOSPITAL ROOM: A LITTLE
CLIMB 3 TO 5 STEPS WITH RAILING: A LITTLE
CLIMB 3 TO 5 STEPS WITH RAILING: A LOT
DRESSING REGULAR LOWER BODY CLOTHING: A LITTLE
DAILY ACTIVITIY SCORE: 19
MOBILITY SCORE: 19
MOVING TO AND FROM BED TO CHAIR: A LITTLE
DRESSING REGULAR UPPER BODY CLOTHING: A LITTLE
WALKING IN HOSPITAL ROOM: A LITTLE
MOVING TO AND FROM BED TO CHAIR: A LITTLE
MOVING TO AND FROM BED TO CHAIR: A LITTLE
TURNING FROM BACK TO SIDE WHILE IN FLAT BAD: A LITTLE
MOBILITY SCORE: 17
HELP NEEDED FOR BATHING: A LITTLE
WALKING IN HOSPITAL ROOM: A LITTLE
MOBILITY SCORE: 18
DRESSING REGULAR LOWER BODY CLOTHING: A LOT
DRESSING REGULAR UPPER BODY CLOTHING: A LITTLE
TURNING FROM BACK TO SIDE WHILE IN FLAT BAD: A LITTLE
STANDING UP FROM CHAIR USING ARMS: A LITTLE
PERSONAL GROOMING: A LITTLE

## 2024-01-15 ASSESSMENT — PAIN DESCRIPTION - LOCATION
LOCATION: HIP

## 2024-01-15 ASSESSMENT — PAIN - FUNCTIONAL ASSESSMENT
PAIN_FUNCTIONAL_ASSESSMENT: 0-10

## 2024-01-15 ASSESSMENT — PAIN SCALES - GENERAL
PAINLEVEL_OUTOF10: 3
PAINLEVEL_OUTOF10: 7
PAINLEVEL_OUTOF10: 4
PAINLEVEL_OUTOF10: 6
PAINLEVEL_OUTOF10: 7
PAINLEVEL_OUTOF10: 8
PAINLEVEL_OUTOF10: 3
PAINLEVEL_OUTOF10: 4
PAINLEVEL_OUTOF10: 3
PAINLEVEL_OUTOF10: 6

## 2024-01-15 ASSESSMENT — PAIN DESCRIPTION - ORIENTATION
ORIENTATION: LEFT

## 2024-01-15 ASSESSMENT — ACTIVITIES OF DAILY LIVING (ADL)
BATHING_ASSISTANCE: MODERATE
ADL_ASSISTANCE: INDEPENDENT
HOME_MANAGEMENT_TIME_ENTRY: 25

## 2024-01-15 NOTE — PROGRESS NOTES
Physical Therapy    Physical Therapy Treatment    Patient Name: Ruth Veloz  MRN: [id number]  Today's Date: 1/15/2024  Time Calculation  Start Time: 1423  Stop Time: 1450  Time Calculation (min): 27 min       Assessment/Plan   PT Assessment  End of Session Communication: Bedside nurse  End of Session Patient Position: Bed, 2 rail up  PT Plan  Inpatient/Swing Bed or Outpatient: Inpatient  PT Plan  Treatment/Interventions: Bed mobility, Transfer training, Gait training, Stair training, Neuromuscular re-education, Neurodevelopmental intervention, Strengthening, Balance training, Endurance training, Range of motion, Therapeutic exercise, Therapeutic activity, Home exercise program, Postural re-education  PT Plan: Skilled PT  PT Frequency: BID  PT Discharge Recommendations: Moderate intensity level of continued care  Equipment Recommended upon Discharge: Wheeled walker  PT Recommended Transfer Status: Assist x1  PT - OK to Discharge: Yes      General Visit Information:   PT  Visit  PT Received On: 01/15/24  General  Family/Caregiver Present: Yes  Caregiver Feedback: Spouse present  Prior to Session Communication: Bedside nurse  Patient Position Received: Bed, 2 rail up  General Comment: Cleared by nursing to be seen for therapy, pt agreeable with tx, supine in bed upon arrival.    Subjective   Precautions:  Precautions  LE Weight Bearing Status: Weight Bearing as Tolerated  Post-Surgical Precautions: Left hip precautions  Precautions Comment: Pt able to recall 3/3 hip precautions.       Objective   Pain:  Pain Assessment  Pain Assessment: 0-10  Pain Score: 6  Pain Type: Surgical pain  Pain Location: Hip  Pain Orientation: Left  Pain Interventions: Repositioned, Cold applied (RN aware, no pain medication due at this time.)    Treatments:  Therapeutic Exercise  Therapeutic Exercise Performed: No    Therapeutic Activity  Therapeutic Activity Performed: No    Balance/Neuromuscular Re-Education  Balance/Neuromuscular  Re-Education Activity Performed: No    Bed Mobility  Bed Mobility: Yes  Bed Mobility 1  Bed Mobility 1: Supine to sitting  Level of Assistance 1: Close supervision  Bed Mobility Comments 1: Increased time and effort to complete bed mobility.  Bed Mobility 2  Bed Mobility  2: Sitting to supine  Level of Assistance 2: Minimum assistance  Bed Mobility Comments 2: Min assist for bilateral LE's during sit to supine.    Ambulation/Gait Training  Ambulation/Gait Training Performed: Yes  Ambulation/Gait Training 1  Surface 1: Level tile  Device 1: Rolling walker  Assistance 1: Contact guard  Comments/Distance (ft) 1: 50' with wheeled walker, presents with slow macho, step to gait pattern, cues for upright posture.  Transfers  Transfer: Yes  Transfer 1  Transfer From 1: Sit to  Transfer to 1: Stand  Transfer Level of Assistance 1: Contact guard  Trials/Comments 1: Cues for proper hand placement, cues for safety during eccentric control in sitting.    Stairs  Stairs: Yes  Stairs  Rails 1: Right  Assistance 1: Minimum assistance  Comment/Number of Steps 1: Ascend/Descend 3 steps, 1 rail (right)  assist on left. Cues for sequence, non-reciprocating pattern.    Outcome Measures:  [facility] Basic Mobility  Turning from your back to your side while in a flat bed without using bedrails: None  Moving from lying on your back to sitting on the side of a flat bed without using bedrails: A little  Moving to and from bed to chair (including a wheelchair): A little  Standing up from a chair using your arms (e.g. wheelchair or bedside chair): A little  To walk in hospital room: A little  Climbing 3-5 steps with railing: A little  Basic Mobility - Total Score: 19        Encounter Problems       Encounter Problems (Active)       Mobility       Bed mobility including supine to sit and sit to supine with supervision. (Progressing)       Start:  01/11/24    Expected End:  01/25/24            Ambulate 60 feet with rolling walker and  supervision. (Progressing)       Start:  01/11/24    Expected End:  01/25/24            Negotiate 3 steps with single handrail +/- straight cane and min assist. (Progressing)       Start:  01/11/24    Expected End:  01/25/24               Pain - Adult          Transfers       Transfers including sit to stand and stand to sit with supervision. (Progressing)       Start:  01/11/24    Expected End:  01/25/24

## 2024-01-15 NOTE — PROGRESS NOTES
"Ruth Veloz is a 73 y.o. female on day 2 of admission presenting with Primary osteoarthritis of left hip.    Subjective   Patient states that her left hip pain is improving       Objective   I evaluated this patient in the presence of her .  I specifically had the patient, with careful assistance of her , get out of bed and walk with a walker.  The patient is able to get out of bed safely stand and walk with a walker.  She is able to return back to the bed and get safely back in bed with the assistance of her .  Left calf and thigh are nontender and nonswollen.  Left lower extremity neurovascular examination is intact.  Physical Exam    Last Recorded Vitals  Blood pressure (!) 131/48, pulse 71, temperature 36.6 °C (97.9 °F), temperature source Oral, resp. rate 17, height 1.549 m (5' 0.98\"), weight 58 kg (127 lb 13.9 oz), SpO2 100 %.  Intake/Output last 3 Shifts:  I/O last 3 completed shifts:  In: 240 (4.1 mL/kg) [P.O.:240]  Out: 1400 (24.1 mL/kg) [Urine:1400 (0.7 mL/kg/hr)]  Weight: 58 kg     Relevant Results      Scheduled medications  acetaminophen, 650 mg, oral, q6h NEHEMIAS  apixaban, 2.5 mg, oral, q12h NEHEMIAS  [START ON 1/16/2024] digoxin, 125 mcg, oral, Daily  famotidine, 20 mg, oral, BID  gabapentin, 200 mg, oral, Nightly  polyethylene glycol, 17 g, oral, Daily  sotalol, 40 mg, oral, Daily  sotalol, 80 mg, oral, Daily      Continuous medications  oxygen, 2 L/min, Last Rate: 2 L/min (01/10/24 1815)  oxygen, 2 L/min, Last Rate: Stopped (01/13/24 1100)      PRN medications  PRN medications: acetaminophen, metoprolol tartrate, morphine, naloxone, oxyCODONE-acetaminophen  Results for orders placed or performed during the hospital encounter of 01/10/24 (from the past 24 hour(s))   Basic metabolic panel   Result Value Ref Range    Glucose 97 65 - 99 mg/dL    Sodium 137 133 - 145 mmol/L    Potassium 3.9 3.4 - 5.1 mmol/L    Chloride 103 97 - 107 mmol/L    Bicarbonate 26 24 - 31 mmol/L    Urea " Nitrogen 9 8 - 25 mg/dL    Creatinine 0.50 0.40 - 1.60 mg/dL    eGFR >90 >60 mL/min/1.73m*2    Calcium 8.4 (L) 8.5 - 10.4 mg/dL    Anion Gap 8 <=19 mmol/L   CBC   Result Value Ref Range    WBC 5.3 4.4 - 11.3 x10*3/uL    nRBC 0.0 0.0 - 0.0 /100 WBCs    RBC 3.56 (L) 4.00 - 5.20 x10*6/uL    Hemoglobin 10.4 (L) 12.0 - 16.0 g/dL    Hematocrit 33.4 (L) 36.0 - 46.0 %    MCV 94 80 - 100 fL    MCH 29.2 26.0 - 34.0 pg    MCHC 31.1 (L) 32.0 - 36.0 g/dL    RDW 13.5 11.5 - 14.5 %    Platelets 263 150 - 450 x10*3/uL   ECG 12 lead   Result Value Ref Range    Ventricular Rate 70 BPM    Atrial Rate 70 BPM    RI Interval 144 ms    QRS Duration 74 ms    QT Interval 364 ms    QTC Calculation(Bazett) 393 ms    P Axis -3 degrees    R Axis -14 degrees    T Axis -23 degrees    QRS Count 11 beats    Q Onset 221 ms    P Onset 149 ms    P Offset 213 ms    T Offset 403 ms    QTC Fredericia 383 ms   ECG 12 Lead   Result Value Ref Range    Ventricular Rate 73 BPM    Atrial Rate 73 BPM    RI Interval 142 ms    QRS Duration 74 ms    QT Interval 360 ms    QTC Calculation(Bazett) 396 ms    P Axis 53 degrees    R Axis 47 degrees    T Axis 59 degrees    QRS Count 12 beats    Q Onset 220 ms    P Onset 149 ms    P Offset 212 ms    T Offset 400 ms    QTC Fredericia 384 ms                            Assessment/Plan   Principal Problem:    Primary osteoarthritis of left hip  Active Problems:    Aortic valve regurgitation    Gastroesophageal reflux disease    Paroxysmal atrial fibrillation (CMS/[facility])    Pure hypercholesterolemia    Mitral regurgitation    Dislocation of hip prosthesis (CMS/[facility])    Dislocation of hip joint prosthesis, initial encounter (CMS/[facility])  Assessment: Satisfactory post reduction of dislocated left hip    Plan: I had a long detailed discussion with the patient in the presence of her  in detail.  Based on my evaluation of this patient transferring out of the bed, walking and transferring back into bed with the supervision and  minimal assistance of her  it is my opinion that the safest course for this patient is to go home with home health and home physical therapy after being further evaluated with physical therapy and instructed regarding stairs.  The patient is somewhat apprehensive regarding this and we will again address this tomorrow morning after physical therapy.       I spent 30 minutes in the professional and overall care of this patient today      Tirso Medina MD

## 2024-01-15 NOTE — CARE PLAN
Pt has decided that she wants to go home with  HHC/PT/OT  Dr Les TERRELL notified.  Notified [facility] of todays discharge.  Pt needs PT OT;  to assist at home      DISCHARGE PLAN: HOME WITH  AND  HHC/PT/OT

## 2024-01-15 NOTE — PROGRESS NOTES
"Ruth Veloz is a 73 y.o. female on day 4 of admission presenting with Primary osteoarthritis of left hip.    Subjective   Alert and oriented x 3, denies complaints chest pain or pressure, palpitations or feeling rapid heart rate.  Spontaneously converted to a sinus rhythm at approximate 03 100 this morning.  Has remained in sinus rhythm since that time       Objective     Physical Exam  Vitals and nursing note reviewed.   Constitutional:       General: She is not in acute distress.     Appearance: Normal appearance. She is not ill-appearing or toxic-appearing.   HENT:      Head: Normocephalic and atraumatic.      Nose: Nose normal.      Mouth/Throat:      Mouth: Mucous membranes are moist.   Cardiovascular:      Rate and Rhythm: Normal rate and regular rhythm.      Pulses: Normal pulses.      Heart sounds: Normal heart sounds. No murmur heard.     No friction rub. No gallop.   Pulmonary:      Effort: Pulmonary effort is normal.      Breath sounds: Normal breath sounds. No wheezing, rhonchi or rales.   Abdominal:      General: Abdomen is flat. Bowel sounds are normal.      Palpations: Abdomen is soft.   Musculoskeletal:         General: Normal range of motion.      Cervical back: Normal range of motion.      Right lower leg: No edema.      Left lower leg: No edema.   Skin:     General: Skin is warm and dry.      Capillary Refill: Capillary refill takes less than 2 seconds.   Neurological:      Mental Status: She is alert and oriented to person, place, and time.   Psychiatric:         Mood and Affect: Mood normal.         Behavior: Behavior normal.         Thought Content: Thought content normal.         Judgment: Judgment normal.         Last Recorded Vitals  Blood pressure (!) 122/48, pulse 73, temperature 36.5 °C (97.7 °F), temperature source Oral, resp. rate 17, height 1.549 m (5' 0.98\"), weight 58 kg (127 lb 13.9 oz), SpO2 100 %.  Intake/Output last 3 Shifts:  I/O last 3 completed shifts:  In: 240 (4.1 " mL/kg) [P.O.:240]  Out: 1400 (24.1 mL/kg) [Urine:1400 (0.7 mL/kg/hr)]  Weight: 58 kg     Relevant Results  Results for orders placed or performed during the hospital encounter of 01/10/24 (from the past 24 hour(s))   Basic metabolic panel   Result Value Ref Range    Glucose 97 65 - 99 mg/dL    Sodium 137 133 - 145 mmol/L    Potassium 3.9 3.4 - 5.1 mmol/L    Chloride 103 97 - 107 mmol/L    Bicarbonate 26 24 - 31 mmol/L    Urea Nitrogen 9 8 - 25 mg/dL    Creatinine 0.50 0.40 - 1.60 mg/dL    eGFR >90 >60 mL/min/1.73m*2    Calcium 8.4 (L) 8.5 - 10.4 mg/dL    Anion Gap 8 <=19 mmol/L   CBC   Result Value Ref Range    WBC 5.3 4.4 - 11.3 x10*3/uL    nRBC 0.0 0.0 - 0.0 /100 WBCs    RBC 3.56 (L) 4.00 - 5.20 x10*6/uL    Hemoglobin 10.4 (L) 12.0 - 16.0 g/dL    Hematocrit 33.4 (L) 36.0 - 46.0 %    MCV 94 80 - 100 fL    MCH 29.2 26.0 - 34.0 pg    MCHC 31.1 (L) 32.0 - 36.0 g/dL    RDW 13.5 11.5 - 14.5 %    Platelets 263 150 - 450 x10*3/uL       Assessment/Plan   Principal Problem:    Primary osteoarthritis of left hip  Active Problems:    Aortic valve regurgitation    Gastroesophageal reflux disease    Paroxysmal atrial fibrillation (CMS/[facility])    Pure hypercholesterolemia    Mitral regurgitation    Dislocation of hip joint prosthesis, initial encounter (CMS/[facility])    Dislocation of hip prosthesis (CMS/[facility])    Status post right total hip repair  Paroxysmal atrial fibrillation  Rapid atrial fibrillation  GERD  Dislocation of hip prosthesis     Overall impression:     1/11: As above, presents for scheduled left total hip repair.  Successful procedure.  Postoperatively patient did have some episodes of rapid atrial fibrillation.  She does have a known atrial fibrillation.  Her sotalol was placed on hold for procedure, this may have exacerbated her atrial fibrillation in combination with the procedure.  Time my assessment patient is rate controlled with heart rate in the 90s.  She will resume her sotalol this morning.  She does have  available as needed metoprolol if necessary for further rate control.  Given she is now in atrial fibrillation would like to resume her home Eliquis today, surgical team is in agreement with this.  Otherwise patient is chest pain-free and euvolemic.  Breathing comfortably on room air with current pulse ox of 99%.  Overall stable from a cardiac perspective for discharge once cleared by Ortho.  Will sign off.  Patient to follow-up with Dr. Watts in the outpatient setting per current outpatient schedule.      1/13: Reconsulted for atrial fibrillation intraoperatively, patient had a spontaneous dislocation of hip prosthesis, she was taken to surgery this morning where she underwent a successful closed reduction.  During procedure after propofol patient was noted to return to a rapid atrial fibrillation.  Likely relative to her acute condition as well as with holding her sotalol this morning.  Patient will receive 40 mg of extraskeletal this morning for a total of 80 mg in the morning and 80 mg in the evening.  Give her 1 dose of IV digoxin.  Will check a EKG to reassess her Qtc/JT noting she is in atrial fibrillation.  Additionally she has been placed on telemetry monitor for further monitoring.  Tomorrow if rate controlled she will revert back to her home dosing of sotalol which is 40 mg in the morning and 80 mg at night.  On assessment she is euvolemic and chest pain-free.   she describes no palpitations or feeling of rapid heart rate.  Her blood pressure is adequate with most recent of 102/56.  She is breathing comfortably on room air.  Will follow with you     1/14: No significant changes over the past 24 hours.  Remains in a rapid atrial fibrillation/flutter.  She does continue to take her sotalol.  Will give her further IV digoxin.  Blood pressure remained stable with most recent of 127/80.  Room air pulse ox 94%.  She is euvolemic on examination and chest pain-free.  Creatinine stable at 0.6.  Hemoglobin stable  10.1.  Ortho is planning on discharging patient home tomorrow.  Daily the patient will achieve rate control by then.  I have recommended the patient follow-up electrophysiology in the outpatient setting.    1/15: Stable overnight.  Denies complaints chest pain or pressure, palpitations or feeling rapid heart rate.  At approximately 0300 this morning patient spontaneously converted back to a sinus rhythm.  She has remained in a sinus rhythm since that time.  She did receive 1 further dose of IV digoxin this morning.  EKG while in sinus rhythm with a QTc of 360; unchanged from initation of digoxin in combo with home sotolol. at this point she will continue on a combination of low-dose sotalol  utilizing 40 mg in the morning and 8 mg at night in combination with 125 mg of digoxin daily.  She will follow-up with Dr. Watts in the outpatient setting in next 1 to 2 weeks for reassessment of her rhythm, additionally I have recommended patient follow-up with Dr. Holt for further electrophysiology management.  Otherwise stable from the cardiac perspective.  Will sign off.      I spent 35 minutes in the professional and overall care of this patient.      Jim Malloy, APRN-CNP

## 2024-01-15 NOTE — NURSING NOTE
Pt asleep. No s/s of pain/discomfort noted. No change noted from initial shift assessment. Dsg to lt hip remains C/D/I. Call light in reach.

## 2024-01-15 NOTE — PROGRESS NOTES
Occupational Therapy    Evaluation/Treatment    Patient Name: Ruth Veloz  MRN: [id number]  : 1950  Today's Date: 01/15/24  Time Calculation  Start Time: 103  Stop Time: 5  Time Calculation (min): 40 min       Assessment:  OT Assessment: OT order received, chart reviewed, re-evaluation completed. Pt demonstrated deficits in ADLs and functional mobility requiring min A and would benefit from acute OT services.  Medical Staff Made Aware: Yes  End of Session Communication: Bedside nurse  End of Session Patient Position: Up in chair  OT Assessment Results: Decreased ADL status, Decreased upper extremity strength, Decreased endurance, Decreased functional mobility, Decreased IADLs  Medical Staff Made Aware: Yes  Plan:  Treatment Interventions: ADL retraining, UE strengthening/ROM, Endurance training, Functional transfer training, Patient/family training, Equipment evaluation/education  OT Frequency: 4 times per week  OT Discharge Recommendations: Moderate intensity level of continued care  Equipment Recommended upon Discharge: Wheeled walker  OT Recommended Transfer Status: Moderate assist  OT - OK to Discharge: Yes  Treatment Interventions: ADL retraining, UE strengthening/ROM, Endurance training, Functional transfer training, Patient/family training, Equipment evaluation/education    Subjective     General:   OT Received On: 01/15/24  General  Reason for Referral: s/p LTHA then closed reduction of lateral sublux   Referred By: Dr. Medina  Past Medical History Relevant to Rehab: A-fib, GERD, OP, AVD, R shoulder bursitis, cervicalgia, spinal stenosis, small intensitve surgery  Family/Caregiver Present: No  Prior to Session Communication: Bedside nurse  Patient Position Received: Bed, 2 rail up  Preferred Learning Style: verbal  General Comment: re-eval s/p reduction of L hip subluxation from initial hip replacement  Precautions:  LE Weight Bearing Status: Weight Bearing as Tolerated  Medical  Precautions: Fall precautions  Post-Surgical Precautions: Left hip precautions  Precautions Comment: provided with hip handout and overviewed with patient and spouse    Pain:  Pain Assessment  Pain Assessment: 0-10  Pain Score: 3  Pain Type: Surgical pain  Pain Location: Hip  Pain Orientation: Left  Pain Interventions: Repositioned    Objective   Cognition:  Overall Cognitive Status: Within Functional Limits       Home Living:  Type of Home: House  Lives With: Spouse  Home Adaptive Equipment: Walker rolling or standard  Home Layout: Two level  Home Access: Stairs to enter with rails  Entrance Stairs-Rails: Both  Entrance Stairs-Number of Steps: 3  Bathroom Shower/Tub: Tub/shower unit, Walk-in shower  Bathroom Toilet: Adaptive toilet seating  Bathroom Equipment: Grab bars in shower  Prior Function:  Level of [place]: Independent with ADLs and functional transfers, Independent with homemaking with ambulation  Receives Help From: Family  ADL Assistance: Independent  Homemaking Assistance: Independent  Ambulatory Assistance: Independent  Vocational: Retired    ADL:  Eating Assistance: Independent  Grooming Assistance: Minimal  Bathing Assistance: Moderate  UE Dressing Assistance: Minimal  LE Dressing Assistance: Maximal  LE Dressing Deficit: Thread RLE into pants, Thread LLE into pants, Don/doff L sock, Don/doff R sock, Supervision/safety, Pull up over hips, Use of adaptive equipment, Requires assistive device for steadying  Toileting Assistance with Device: Moderate  Toileting Deficit: Clothing management up, Clothing management down  Activities of Daily Living:      Grooming  Grooming Level of Assistance: Setup  Grooming Where Assessed: Chair  Grooming Comments: washing face and hands declined oral care and hair combing    LE Bathing  LE Bathing Comments: educated on tub transfer bench and log sponge    LE Dressing  LE Dressing: Yes  LE Dressing Adaptive Equipment: Reacher, Sock aide  Pants Level of Assistance:  Moderate assistance, Moderate verbal cues  Sock Level of Assistance: Minimum assistance, Moderate verbal cues  LE Dressing Where Assessed: Chair  LE Dressing Comments: Pt educated on reacher and sock aide use, able to don pants and underwear with mod A post education    Toileting  Toileting Level of Assistance: Minimum assistance  Where Assessed: Toilet  Toileting Comments: assist for clothing management, no hygiene needed pt unable to urinate  Activity Tolerance:  Endurance: Decreased tolerance for upright activites  Functional Standing Tolerance:     Bed Mobility/Transfers: Bed Mobility  Bed Mobility: Yes  Bed Mobility 1  Bed Mobility 1: Supine to sitting  Level of Assistance 1: Close supervision  Bed Mobility Comments 1: HOB elevated and use of rail with cues for transfer technique to maintain precautions, required seated rest break    Transfers  Transfer: Yes  Transfer 1  Transfer From 1: Sit to  Transfer to 1: Stand  Technique 1: Sit to stand  Transfer Device 1: Walker  Transfer Level of Assistance 1: Minimum assistance  Trials/Comments 1: Pt stood from bed with min A and cues for transfer technique. Pt performed functional mobility with mod VCs and min A to and from bathrom in room. Frequent cuing for sequencing, and Rw management. Pt returned to chair in room.  Transfers 2  Transfer From 2: Stand to  Transfer to 2: Toilet  Technique 2: Stand to sit  Transfer Device 2: Walker  Transfer Level of Assistance 2: Minimum assistance  Trials/Comments 2: cues for transfer techinque to and from toilet with commode with grab bars over toilet.  Transfers 3  Transfer From 3: Stand to  Transfer to 3: Sit, Chair with arms  Technique 3: Stand to sit  Transfer Device 3: Walker  Transfer Level of Assistance 3: Minimum assistance  Trials/Comments 3: Pt transferred to bedside chair with min A and cues for technique to maintain precautions. performed additional sit to stand to sit transfer during dressing with min A.    Vision:    Sensation:  Light Touch: Partial deficits in the LLE (reports paresthesia with sitting on toilet with L LE)  Strength:  Strength Comments: 4/5 B Ues    Coordination:  Movements are Fluid and Coordinated: Yes (for upper body)   Hand Function:  Hand Function  Gross Grasp: Functional  Coordination: Functional  Extremities: RUE   RUE : Within Functional Limits and LUE   LUE: Within Functional Limits    Outcome Measures: [facility] Daily Activity  Putting on and taking off regular lower body clothing: A lot  Bathing (including washing, rinsing, drying): A lot  Putting on and taking off regular upper body clothing: A little  Toileting, which includes using toilet, bedpan or urinal: A little  Taking care of personal grooming such as brushing teeth: A little  Eating Meals: None  Daily Activity - Total Score: 17        Education Documentation  Handouts, taught by Latisha Sandoval OT at 1/15/2024  1:00 PM.  Learner: Patient  Readiness: Acceptance  Method: Explanation  Response: Verbalizes Understanding  Comment: Pt and spouse educated on precautions and OT POC    Precautions, taught by Latisha Sandoval OT at 1/15/2024  1:00 PM.  Learner: Patient  Readiness: Acceptance  Method: Explanation  Response: Verbalizes Understanding  Comment: Pt and spouse educated on precautions and OT POC    ADL Training, taught by Latisha Sandoval OT at 1/15/2024  1:00 PM.  Learner: Patient  Readiness: Acceptance  Method: Explanation  Response: Verbalizes Understanding  Comment: Pt and spouse educated on precautions and OT POC    Education Comments  No comments found.        Goals:  Encounter Problems         Encounter Problems (Resolved)       OT Goals       Pt will complete all functional transfers and mobility with mod indep using a RW. (Not met)       Start:  01/11/24    Expected End:  01/25/24    Resolved:  01/15/24         Pt will complete all ADL's/IADL's with mod indep using a device as needed. (Not met)       Start:  01/11/24    Expected End:   01/25/24    Resolved:  01/15/24         Pt will tolerate functional mobility for a household distance using a RW with mod indep. (Not met)       Start:  01/11/24    Expected End:  01/25/24    Resolved:  01/15/24

## 2024-01-15 NOTE — PROGRESS NOTES
Ruth Veloz is a 73 y.o. female on day 2 of admission presenting with Primary osteoarthritis of left hip.      Subjective   Patient seen and examined.   bedside.  Complains of pain related to procedure.  Denies chest pain or shortness of breath.  No acute distress.    Objective     Last Recorded Vitals  BP (!) 122/48 (BP Location: Right arm, Patient Position: Lying)   Pulse 73   Temp 36.5 °C (97.7 °F) (Oral)   Resp 17   Wt 58 kg (127 lb 13.9 oz)   SpO2 100%   Intake/Output last 3 Shifts:    Intake/Output Summary (Last 24 hours) at 1/15/2024 1045  Last data filed at 1/15/2024 0800  Gross per 24 hour   Intake 360 ml   Output 800 ml   Net -440 ml       Admission Weight  Weight: 58 kg (127 lb 13.9 oz) (01/10/24 1857)    Daily Weight  01/10/24 : 58 kg (127 lb 13.9 oz)    Image Results  ECG 12 lead  Atrial flutter with variable AV block with premature ventricular or aberrantly conducted complexes  Minimal voltage criteria for LVH, may be normal variant  Nonspecific T wave abnormality  Abnormal ECG  No previous ECGs available  ECG 12 Lead  Normal sinus rhythm  Nonspecific T wave abnormality  Abnormal ECG  When compared with ECG of 15-SATINDER-2024 06:15, (unconfirmed)  Questionable change in QRS axis  Nonspecific T wave abnormality now evident in Anterior leads  ECG 12 lead  Normal sinus rhythm  Minimal voltage criteria for LVH, may be normal variant  Borderline ECG  When compared with ECG of 14-JAN-2024 06:04, (unconfirmed)  Sinus rhythm has replaced Atrial flutter  Vent. rate has decreased BY  36 BPM  Questionable change in QRS axis  QT has shortened      Physical Exam  Constitutional:       Appearance: Normal appearance.   HENT:      Head: Normocephalic and atraumatic.      Nose: Nose normal.      Mouth/Throat:      Mouth: Mucous membranes are moist.   Eyes:      Pupils: Pupils are equal, round, and reactive to light.   Cardiovascular:      Rate and Rhythm: Normal rate.      Pulses: Normal pulses.       Heart sounds: Normal heart sounds.   Pulmonary:      Effort: Pulmonary effort is normal.      Breath sounds: Normal breath sounds.   Abdominal:      General: Bowel sounds are normal.      Palpations: Abdomen is soft.   Musculoskeletal:      Cervical back: Normal range of motion.      Comments: Range of motion left lower extremity limited due to hip replacement   Skin:     General: Skin is warm and dry.      Capillary Refill: Capillary refill takes less than 2 seconds.      Comments: Left hip dressing clean dry intact   Neurological:      Mental Status: She is alert and oriented to person, place, and time.   Psychiatric:         Mood and Affect: Mood normal.         Relevant Results               Assessment/Plan        Principal Problem:    Primary osteoarthritis of left hip  Active Problems:    Aortic valve regurgitation    Gastroesophageal reflux disease    Paroxysmal atrial fibrillation (CMS/HCC)    Pure hypercholesterolemia    Mitral regurgitation    Dislocation of hip joint prosthesis, initial encounter (CMS/[facility])    Dislocation of hip prosthesis (CMS/[facility])    Urinary retention  -Resolved     Paroxysmal afib  -Converted to normal sinus rhythm overnight  - sotalol, eliquis restarted  - metoprolol prn  -Cardiology following, have now signed off     Arthroplasty of left hip  - management/pain management per Dr. Medina  -Status post left total hip arthroplasty on 1/10/2024  -Closed reduction 1/13/2024     Encourage IS  PT/OT        Mariela Trevino APRN-CNP

## 2024-01-15 NOTE — CARE PLAN
Per yesterdays notes by NP Sky Malloy; pt was in rapid Afib/ Flutter. Care Coordinator to follow up with pt. Pt requested home with [facility]--PT evaluated pt as moderate    10:00 Spoke with pt and her  this morning about discharge plans; Pt is unsure if she wants Skilled or home with [facility]; she wants to work with PT today before she decides. Pt would like for referrals to be placed to [facility] [place] [facility]) Anshul Travis just in case she needs to go to a facility for rehab.   said that he is available at home to assist her if she decides to go home with [facility]      DISCHARGE PLAN: TBD; PT DOES NOT YET HAVE A SAFE DISCHARGE PLAN IN PLACE. DO NOT DISCHARGE PT BEFORE SPEAKING WITH CARE COORDINATION

## 2024-01-15 NOTE — PROGRESS NOTES
Physical Therapy    Physical Therapy Treatment    Patient Name: Ruth Veloz  MRN: [id number]  Today's Date: 1/15/2024  Time Calculation  Start Time: 1230  Stop Time: 1254  Time Calculation (min): 24 min       Assessment/Plan   PT Assessment  End of Session Communication: Bedside nurse  End of Session Patient Position: Up in chair  PT Plan  Inpatient/Swing Bed or Outpatient: Inpatient  PT Plan  Treatment/Interventions: Bed mobility, Transfer training, Gait training, Stair training, Neuromuscular re-education, Neurodevelopmental intervention, Strengthening, Balance training, Endurance training, Range of motion, Therapeutic exercise, Therapeutic activity, Home exercise program, Postural re-education  PT Plan: Skilled PT  PT Frequency: BID  PT Discharge Recommendations: Moderate intensity level of continued care  Equipment Recommended upon Discharge: Wheeled walker  PT Recommended Transfer Status: Assist x1  PT - OK to Discharge: Yes      General Visit Information:   PT  Visit  PT Received On: 01/15/24  General  Prior to Session Communication: Bedside nurse  Patient Position Received: Bed, 2 rail up  General Comment: Cleared by nursing to be seen for therapy, pt agreeable with tx, supine in bed upon arrival.    Subjective   Precautions:  Precautions  LE Weight Bearing Status: Weight Bearing as Tolerated  Post-Surgical Precautions: Left hip precautions  Precautions Comment: Pt able to recall 3/3 hip precautions.       Objective   Pain:  Pain Assessment  Pain Assessment: 0-10  Pain Score: 4  Pain Type: Surgical pain  Pain Location: Hip  Pain Orientation: Left  Pain Interventions: Repositioned (Pain medication take ~45 minutes prior to tx per patient.)    Treatments:  Therapeutic Exercise  Therapeutic Exercise Performed: Yes  Therapeutic Exercise Activity 1: Bilateral ankle pumps x15  Therapeutic Exercise Activity 2: Right hip flexion x15  Therapeutic Exercise Activity 3: Bilateral knee extension x15  Therapeutic  Exercise Activity 4: Resisted hip abd/add 15  Therapeutic Exercise Activity 5: Gluteal sets x15    Therapeutic Activity  Therapeutic Activity Performed: No    Balance/Neuromuscular Re-Education  Balance/Neuromuscular Re-Education Activity Performed: No    Bed Mobility  Bed Mobility: Yes  Bed Mobility 1  Bed Mobility 1: Supine to sitting  Level of Assistance 1: Close supervision  Bed Mobility Comments 1: Increased time and effort to complete bed mobility.    Ambulation/Gait Training  Ambulation/Gait Training Performed: Yes  Ambulation/Gait Training 1  Surface 1: Level tile  Device 1: Rolling walker  Assistance 1: Contact guard  Comments/Distance (ft) 1: 20' with wheeled walker, presents with slow macho, step to gait pattern, mild flexed posture, decreased left heel strike/toe off.  Transfers  Transfer: Yes  Transfer 1  Transfer From 1: Sit to  Transfer to 1: Stand  Transfer Level of Assistance 1: Contact guard  Trials/Comments 1: Cues for proper hand placement, cues for safety during eccentric control in sitting.    Stairs  Stairs: No    Outcome Measures:  [facility] Basic Mobility  Turning from your back to your side while in a flat bed without using bedrails: None  Moving from lying on your back to sitting on the side of a flat bed without using bedrails: A little  Moving to and from bed to chair (including a wheelchair): A little  Standing up from a chair using your arms (e.g. wheelchair or bedside chair): A little  To walk in hospital room: A little  Climbing 3-5 steps with railing: A lot  Basic Mobility - Total Score: 18      Encounter Problems       Encounter Problems (Active)       Mobility       Bed mobility including supine to sit and sit to supine with supervision. (Progressing)       Start:  01/11/24    Expected End:  01/25/24            Ambulate 60 feet with rolling walker and supervision. (Progressing)       Start:  01/11/24    Expected End:  01/25/24            Negotiate 3 steps with single handrail +/-  straight cane and min assist. (Progressing)       Start:  01/11/24    Expected End:  01/25/24               Pain - Adult          Transfers       Transfers including sit to stand and stand to sit with supervision. (Progressing)       Start:  01/11/24    Expected End:  01/25/24

## 2024-01-15 NOTE — CARE PLAN
The patient's goals for the shift include      The clinical goals for the shift include PAIN CONTROL    Over the shift, the patient did not make progress toward the following goals. Barriers to progression include UP W/ ASSIST. Recommendations to address these barriers include DC W/ HHC.

## 2024-01-16 ENCOUNTER — DOCUMENTATION (OUTPATIENT)
Dept: HOME HEALTH SERVICES | Facility: HOME HEALTH | Age: 74
End: 2024-01-16
Payer: MEDICARE

## 2024-01-16 ENCOUNTER — HOME HEALTH ADMISSION (OUTPATIENT)
Dept: HOME HEALTH SERVICES | Facility: HOME HEALTH | Age: 74
End: 2024-01-16
Payer: MEDICARE

## 2024-01-16 ENCOUNTER — PHARMACY VISIT (OUTPATIENT)
Dept: PHARMACY | Facility: CLINIC | Age: 74
End: 2024-01-16
Payer: COMMERCIAL

## 2024-01-16 VITALS
SYSTOLIC BLOOD PRESSURE: 132 MMHG | TEMPERATURE: 97.9 F | WEIGHT: 127.87 LBS | DIASTOLIC BLOOD PRESSURE: 43 MMHG | OXYGEN SATURATION: 100 % | BODY MASS INDEX: 24.14 KG/M2 | HEART RATE: 60 BPM | RESPIRATION RATE: 16 BRPM | HEIGHT: 61 IN

## 2024-01-16 PROCEDURE — 2500000001 HC RX 250 WO HCPCS SELF ADMINISTERED DRUGS (ALT 637 FOR MEDICARE OP): Performed by: ORTHOPAEDIC SURGERY

## 2024-01-16 PROCEDURE — 99024 POSTOP FOLLOW-UP VISIT: CPT | Performed by: ORTHOPAEDIC SURGERY

## 2024-01-16 PROCEDURE — 97116 GAIT TRAINING THERAPY: CPT | Mod: GP,CQ

## 2024-01-16 PROCEDURE — RXMED WILLOW AMBULATORY MEDICATION CHARGE

## 2024-01-16 PROCEDURE — 97110 THERAPEUTIC EXERCISES: CPT | Mod: GP,CQ

## 2024-01-16 PROCEDURE — 2500000001 HC RX 250 WO HCPCS SELF ADMINISTERED DRUGS (ALT 637 FOR MEDICARE OP): Performed by: NURSE PRACTITIONER

## 2024-01-16 PROCEDURE — 2500000004 HC RX 250 GENERAL PHARMACY W/ HCPCS (ALT 636 FOR OP/ED): Performed by: ORTHOPAEDIC SURGERY

## 2024-01-16 PROCEDURE — 97535 SELF CARE MNGMENT TRAINING: CPT | Mod: GO,CO

## 2024-01-16 PROCEDURE — 97530 THERAPEUTIC ACTIVITIES: CPT | Mod: GP,CQ

## 2024-01-16 RX ORDER — DIGOXIN 125 MCG
125 TABLET ORAL DAILY
Qty: 30 TABLET | Refills: 1 | Status: SHIPPED | OUTPATIENT
Start: 2024-01-17 | End: 2024-02-28 | Stop reason: SDUPTHER

## 2024-01-16 RX ORDER — OXYCODONE AND ACETAMINOPHEN 5; 325 MG/1; MG/1
1 TABLET ORAL EVERY 6 HOURS PRN
Qty: 28 TABLET | Refills: 0 | Status: SHIPPED | OUTPATIENT
Start: 2024-01-16 | End: 2024-01-22 | Stop reason: SDUPTHER

## 2024-01-16 RX ADMIN — POLYETHYLENE GLYCOL 3350 17 G: 17 POWDER, FOR SOLUTION ORAL at 09:12

## 2024-01-16 RX ADMIN — ACETAMINOPHEN 650 MG: 325 TABLET ORAL at 12:43

## 2024-01-16 RX ADMIN — OXYCODONE AND ACETAMINOPHEN 1 TABLET: 325; 5 TABLET ORAL at 14:50

## 2024-01-16 RX ADMIN — OXYCODONE AND ACETAMINOPHEN 1 TABLET: 325; 5 TABLET ORAL at 09:12

## 2024-01-16 RX ADMIN — SOTALOL HYDROCHLORIDE 40 MG: 80 TABLET ORAL at 09:12

## 2024-01-16 RX ADMIN — DIGOXIN 125 MCG: 125 TABLET ORAL at 09:12

## 2024-01-16 RX ADMIN — OXYCODONE AND ACETAMINOPHEN 1 TABLET: 325; 5 TABLET ORAL at 04:35

## 2024-01-16 RX ADMIN — APIXABAN 2.5 MG: 2.5 TABLET, FILM COATED ORAL at 09:12

## 2024-01-16 RX ADMIN — FAMOTIDINE 20 MG: 20 TABLET ORAL at 09:12

## 2024-01-16 ASSESSMENT — ACTIVITIES OF DAILY LIVING (ADL)
LACK_OF_TRANSPORTATION: NO
HOME_MANAGEMENT_TIME_ENTRY: 55

## 2024-01-16 ASSESSMENT — COGNITIVE AND FUNCTIONAL STATUS - GENERAL
CLIMB 3 TO 5 STEPS WITH RAILING: A LITTLE
HELP NEEDED FOR BATHING: A LITTLE
DAILY ACTIVITIY SCORE: 17
DAILY ACTIVITIY SCORE: 17
TOILETING: A LITTLE
TURNING FROM BACK TO SIDE WHILE IN FLAT BAD: A LITTLE
TOILETING: A LITTLE
HELP NEEDED FOR BATHING: A LOT
STANDING UP FROM CHAIR USING ARMS: A LITTLE
WALKING IN HOSPITAL ROOM: A LITTLE
DRESSING REGULAR UPPER BODY CLOTHING: A LITTLE
HELP NEEDED FOR BATHING: A LOT
MOBILITY SCORE: 19
PERSONAL GROOMING: A LITTLE
MOVING TO AND FROM BED TO CHAIR: A LITTLE
MOVING TO AND FROM BED TO CHAIR: A LITTLE
PERSONAL GROOMING: A LITTLE
CLIMB 3 TO 5 STEPS WITH RAILING: A LITTLE
MOBILITY SCORE: 19
TOILETING: A LITTLE
STANDING UP FROM CHAIR USING ARMS: A LITTLE
DRESSING REGULAR UPPER BODY CLOTHING: A LITTLE
CLIMB 3 TO 5 STEPS WITH RAILING: A LITTLE
MOVING TO AND FROM BED TO CHAIR: A LITTLE
DRESSING REGULAR LOWER BODY CLOTHING: A LITTLE
TURNING FROM BACK TO SIDE WHILE IN FLAT BAD: A LITTLE
TURNING FROM BACK TO SIDE WHILE IN FLAT BAD: A LITTLE
DRESSING REGULAR UPPER BODY CLOTHING: A LITTLE
WALKING IN HOSPITAL ROOM: A LITTLE
MOBILITY SCORE: 19
DAILY ACTIVITIY SCORE: 20
DRESSING REGULAR LOWER BODY CLOTHING: A LOT
WALKING IN HOSPITAL ROOM: A LITTLE
STANDING UP FROM CHAIR USING ARMS: A LITTLE
DRESSING REGULAR LOWER BODY CLOTHING: A LOT

## 2024-01-16 ASSESSMENT — PAIN SCALES - GENERAL
PAINLEVEL_OUTOF10: 6
PAINLEVEL_OUTOF10: 0 - NO PAIN
PAINLEVEL_OUTOF10: 3
PAINLEVEL_OUTOF10: 6
PAINLEVEL_OUTOF10: 7
PAINLEVEL_OUTOF10: 0 - NO PAIN
PAINLEVEL_OUTOF10: 2
PAINLEVEL_OUTOF10: 6
PAINLEVEL_OUTOF10: 7

## 2024-01-16 ASSESSMENT — PAIN - FUNCTIONAL ASSESSMENT
PAIN_FUNCTIONAL_ASSESSMENT: 0-10
PAIN_FUNCTIONAL_ASSESSMENT: FLACC (FACE, LEGS, ACTIVITY, CRY, CONSOLABILITY)
PAIN_FUNCTIONAL_ASSESSMENT: 0-10
PAIN_FUNCTIONAL_ASSESSMENT: FLACC (FACE, LEGS, ACTIVITY, CRY, CONSOLABILITY)
PAIN_FUNCTIONAL_ASSESSMENT: 0-10
PAIN_FUNCTIONAL_ASSESSMENT: 0-10

## 2024-01-16 ASSESSMENT — PAIN DESCRIPTION - LOCATION: LOCATION: HIP

## 2024-01-16 ASSESSMENT — PAIN DESCRIPTION - ORIENTATION: ORIENTATION: LEFT

## 2024-01-16 NOTE — PROGRESS NOTES
01/16/24 1121   Current Planned Discharge Disposition   Current Planned Discharge Disposition Home H

## 2024-01-16 NOTE — HH CARE COORDINATION
Home Care received a Referral for Physical Therapy. We have processed the referral for a Start of Care on 01/18-01/19.     If you have any questions or concerns, please feel free to contact us at [phone]. Follow the prompts, enter your five digit zip code, and you will be directed to your care team on [facility].

## 2024-01-16 NOTE — PROGRESS NOTES
Physical Therapy    Physical Therapy Treatment    Patient Name: Ruth Veloz  MRN: [id number]  Today's Date: 1/16/2024  Time Calculation  Start Time: 0852  Stop Time: 0938  Time Calculation (min): 46 min       Assessment/Plan   PT Assessment  End of Session Communication: Bedside nurse  End of Session Patient Position: Up in chair  PT Plan  Inpatient/Swing Bed or Outpatient: Inpatient  PT Plan  Treatment/Interventions: Bed mobility, Transfer training, Gait training, Stair training, Neuromuscular re-education, Neurodevelopmental intervention, Strengthening, Balance training, Endurance training, Range of motion, Therapeutic exercise, Therapeutic activity, Home exercise program, Postural re-education  PT Plan: Skilled PT  PT Frequency: BID  PT Discharge Recommendations: Moderate intensity level of continued care  Equipment Recommended upon Discharge: Wheeled walker  PT Recommended Transfer Status: Assist x1  PT - OK to Discharge: Yes      General Visit Information:   PT  Visit  PT Received On: 01/16/24  General  Family/Caregiver Present: Yes  Caregiver Feedback: Spouse present  Prior to Session Communication: Bedside nurse  Patient Position Received: Bed, 2 rail up  General Comment: Cleared by nursing to be seen for therapy, pt agreeable with tx, supine in bed upon arrival.    Subjective   Precautions:  Precautions  LE Weight Bearing Status: Weight Bearing as Tolerated  Post-Surgical Precautions: Left hip precautions  Precautions Comment: Pt able to recall 3/3 precautions.       Objective   Pain:  Pain Assessment  Pain Assessment: 0-10  Pain Score: 6  Pain Type: Surgical pain  Pain Location: Hip  Pain Orientation: Left  Pain Interventions: Cold applied (Rn notified and in to give pain medication.)    Treatments:  Therapeutic Exercise  Therapeutic Exercise Performed: Yes  Therapeutic Exercise Activity 1: Bilateral ankle pumps x15  Therapeutic Exercise Activity 2: Bilateral hip flexion x15  Therapeutic Exercise  Activity 3: Bilateral knee extension x15  Therapeutic Exercise Activity 4: Resisted hip abd/add 15 (Issued therex sheet for home going.)  Therapeutic Exercise Activity 5: Gluteal sets x15    Therapeutic Activity  Therapeutic Activity Performed: No    Balance/Neuromuscular Re-Education  Balance/Neuromuscular Re-Education Activity Performed: No    Bed Mobility  Bed Mobility: Yes  Bed Mobility 1  Bed Mobility 1: Supine to sitting  Level of Assistance 1: Close supervision  Bed Mobility Comments 1: Increased time and effort to complete bed mobility.  Bed Mobility 2  Bed Mobility  2: Sitting to supine  Level of Assistance 2: Minimum assistance  Bed Mobility Comments 2: Min assist for bilateral LE's during sit to supine.    Ambulation/Gait Training  Ambulation/Gait Training Performed: Yes  Ambulation/Gait Training 1  Surface 1: Level tile  Device 1: Rolling walker  Assistance 1: Close supervision  Comments/Distance (ft) 1: 50' with wheeled walker, presents with slow macho, step to gait pattern, cues for upright posture.  Transfers  Transfer: Yes  Transfer 1  Transfer From 1: Sit to  Transfer to 1: Stand  Transfer Level of Assistance 1: Close supervision  Trials/Comments 1: Cues for proper hand placement, cues for safety during eccentric control in sitting.    Stairs  Stairs: Yes  Stairs  Rails 1: Right  Assistance 1: Minimum assistance  Comment/Number of Steps 1: Ascend/Descend 3 steps, 1 rail (right) therapist assist on left. Cues for sequence, non-reciprocating pattern    Outcome Measures:  [facility] Basic Mobility  Turning from your back to your side while in a flat bed without using bedrails: None  Moving from lying on your back to sitting on the side of a flat bed without using bedrails: A little  Moving to and from bed to chair (including a wheelchair): A little  Standing up from a chair using your arms (e.g. wheelchair or bedside chair): A little  To walk in hospital room: A little  Climbing 3-5 steps with railing: A  darren  Basic Mobility - Total Score: 19         Encounter Problems       Encounter Problems (Active)       Mobility       Bed mobility including supine to sit and sit to supine with supervision. (Met)       Start:  01/11/24    Expected End:  01/25/24    Resolved:  01/16/24         Ambulate 60 feet with rolling walker and supervision. (Progressing)       Start:  01/11/24    Expected End:  01/25/24            Negotiate 3 steps with single handrail +/- straight cane and min assist. (Met)       Start:  01/11/24    Expected End:  01/25/24    Resolved:  01/16/24            Pain - Adult             Encounter Problems (Resolved)       Transfers       Transfers including sit to stand and stand to sit with supervision. (Met)       Start:  01/11/24    Expected End:  01/25/24    Resolved:  01/16/24

## 2024-01-16 NOTE — PROGRESS NOTES
Pt has an active discharge order. Dr. Medina placed an internal referral for home care. Sent message to daily intake RN for [facility] to get acceptance and SOC date. Waiting for insurance approval and SOC with [facility]. Patient does not have a secure discharge plan at this time.     PENDING ACCEPTANCE AND SOC DATE, PATIENT WILL RETURN HOME WITH [facility]. PLAN IS NOT YET SECURED.   PLEASE CONTACT CARE COORDINATION PRIOR TO DISCHARGE.     [id number] UPDATE: [facility] ACCEPTED PATIENT, SOC 01/18/24-01/19/24.     PATIENT HAS A SAFE DISCHARGE PLAN TO RETURN HOME WITH [facility] SOC 24-48HRS.          01/16/24 1117   Discharge Planning   Living Arrangements Spouse/significant other   Support Systems Spouse/significant other   Assistance Needed [facility]   Type of Residence Private residence   Number of Stairs to Enter Residence 3   Number of Stairs Within Residence 13   Do you have animals or pets at home? No   Who is requesting discharge planning? Patient   Home or Post Acute Services In home services   Type of Home Care Services Home OT;Home PT   Patient expects to be discharged to: home with [facility]   Does the patient need discharge transport arranged? No   Financial Resource Strain   How hard is it for you to pay for the very basics like food, housing, medical care, and heating? Not hard   Housing Stability   In the last 12 months, was there a time when you were not able to pay the mortgage or rent on time? N   In the last 12 months, how many places have you lived? 1   In the last 12 months, was there a time when you did not have a steady place to sleep or slept in a shelter (including now)? N   Transportation Needs   In the past 12 months, has lack of transportation kept you from medical appointments or from getting medications? no   In the past 12 months, has lack of transportation kept you from meetings, work, or from getting things needed for daily living? No   Patient Choice   Provider Choice list and CMS website  (https://medicare.gov/care-compare#search) for post-acute Quality and Resource Measure Data were provided and reviewed with: Patient   Patient / Family choosing to utilize agency / facility established prior to hospitalization No

## 2024-01-16 NOTE — NURSING NOTE
Pt A&O x3 currently resting in bed. No complaints or concerns. Call light within reach.  Dsg dry and intact to left hip.

## 2024-01-16 NOTE — CARE PLAN
The patient's goals for the shift include  comfort and dc planning    The clinical goals for the shift include Pain control, increased mobility

## 2024-01-16 NOTE — PROGRESS NOTES
"Ruth Veloz is a 73 y.o. female on day 3 of admission presenting with Primary osteoarthritis of left hip.    Subjective   Patient states that her left hip pain is improving       Objective   I evaluated this patient in the presence of her   In the physical therapist  I specifically had the patient, with careful assistance of her , get out of bed and walk with a walker.  The patient is able to get out of bed safely stand and walk with a walker.  She is able to return back to the bed and get safely back in bed with the assistance of her .  Left calf and thigh are nontender and nonswollen.  Left lower extremity neurovascular examination is intact.  Physical Exam   General: The patient is sitting comfortably in a chair.  Position of the left hip is clinically  satisfactory.  Left calf and thigh are nontender and nonswollen  Last Recorded Vitals  Blood pressure (!) 102/42, pulse 62, temperature 36.3 °C (97.3 °F), temperature source Oral, resp. rate 14, height 1.549 m (5' 0.98\"), weight 58 kg (127 lb 13.9 oz), SpO2 95 %.  Intake/Output last 3 Shifts:  I/O last 3 completed shifts:  In: 965 (16.6 mL/kg) [P.O.:960; IV Piggyback:5]  Out: 2300 (39.7 mL/kg) [Urine:2300 (1.1 mL/kg/hr)]  Weight: 58 kg     Relevant Results      Scheduled medications  acetaminophen, 650 mg, oral, q6h NEHEMIAS  apixaban, 2.5 mg, oral, q12h NEHEMIAS  digoxin, 125 mcg, oral, Daily  famotidine, 20 mg, oral, BID  gabapentin, 200 mg, oral, Nightly  polyethylene glycol, 17 g, oral, Daily  sotalol, 40 mg, oral, Daily  sotalol, 80 mg, oral, Daily      Continuous medications  oxygen, 2 L/min, Last Rate: 2 L/min (01/10/24 1815)  oxygen, 2 L/min, Last Rate: Stopped (01/13/24 1100)      PRN medications  PRN medications: acetaminophen, metoprolol tartrate, morphine, naloxone, oxyCODONE-acetaminophen  No results found for this or any previous visit (from the past 24 hour(s)).                           Assessment/Plan   Principal Problem:    " Primary osteoarthritis of left hip  Active Problems:    Aortic valve regurgitation    Gastroesophageal reflux disease    Paroxysmal atrial fibrillation (CMS/[facility])    Pure hypercholesterolemia    Mitral regurgitation    Dislocation of hip prosthesis (CMS/[facility])    Dislocation of hip joint prosthesis, initial encounter (CMS/[facility])  Assessment: Satisfactory post reduction of dislocated left hip    Plan: I had a long detailed discussion with the patient in the presence of her  in detail.  Based on my evaluation of this patient transferring out of the bed, walking and transferring back into bed with the supervision and minimal assistance of her  it is my opinion that the safest course for this patient is to go home with home health and home physical therapy after being further evaluated with physical therapy and instructed regarding stairs.  The patient  has been evaluated by physical therapy and she and her  are agreeable to the patient going home today with home physical therapy and home health and the physical therapist and I agree.       I spent 30 minutes in the professional and overall care of this patient today      Tirso Medina MD

## 2024-01-16 NOTE — PROGRESS NOTES
Occupational Therapy    OT Treatment    Patient Name: Ruth Veloz  MRN: [id number]  Today's Date: 1/16/2024  Time Calculation  Start Time: 1139  Stop Time: 1234  Time Calculation (min): 55 min         Assessment:  OT Assessment: Fair progress made towards OT goals. Continue with current OT POC to increase strength, balance and functional tolerance to maximize safety and independence in the last restrictive environment.  Evaluation/Treatment Tolerance: Patient tolerated treatment well  End of Session Communication: Bedside nurse  End of Session Patient Position: Up in chair, Alarm off, not on at start of session  OT Assessment Results: Decreased ADL status, Decreased upper extremity strength, Decreased endurance, Decreased functional mobility, Decreased IADLs  Evaluation/Treatment Tolerance: Patient tolerated treatment well  Plan:  Treatment Interventions: ADL retraining, UE strengthening/ROM, Endurance training, Functional transfer training, Patient/family training, Equipment evaluation/education  OT Frequency: 4 times per week  OT Discharge Recommendations: Moderate intensity level of continued care  Equipment Recommended upon Discharge: Wheeled walker  OT Recommended Transfer Status: Moderate assist  OT - OK to Discharge: Yes  Treatment Interventions: ADL retraining, UE strengthening/ROM, Endurance training, Functional transfer training, Patient/family training, Equipment evaluation/education    Subjective   Previous Visit Info:  OT Last Visit  OT Received On: 01/16/24  General:  General  Reason for Referral: s/p LTHA then closed reduction of lateral sublux 1/12  Past Medical History Relevant to Rehab: A-fib, GERD, OP, AVD, R shoulder bursitis, cervicalgia, spinal stenosis, small intensitve surgery  Family/Caregiver Present: Yes  Caregiver Feedback: Spouse present- questions, comments and concerns addressed regarding OT  Prior to Session Communication: Bedside nurse  Patient Position Received: Bed, 2 rail  up, Alarm off, not on at start of session  General Comment: Pt cleared for OT session per nursing, cooperative throughout session.  Precautions:  LE Weight Bearing Status: Weight Bearing as Tolerated  Medical Precautions: Fall precautions  Post-Surgical Precautions: Left hip precautions  Precautions Comment: Pt able to independently recall 3/3 posterior hip precautions this date.  Vital Signs:     Pain:  Pain Assessment  Pain Assessment: 0-10  Pain Score: 7  Pain Type: Surgical pain  Pain Location: Hip  Pain Orientation: Left  Clinical Progression: Not changed  Pain Interventions: Repositioned, Ambulation/increased activity (RN made aware)    Objective    Cognition:  Cognition  Overall Cognitive Status: Within Functional Limits  Orientation Level: Oriented X4  Insight: Within function limits  Impulsive: Within functional limits  Coordination:  Movements are Fluid and Coordinated: No  Upper Body Coordination: WFL  Lower Body Coordination: slower rate of movement left LE  Activities of Daily Living: Grooming  Grooming Comments: hand hygiene completed standing at sink with FWW and distant supervision    UE Dressing  UE Dressing Comments: pt modified independent to don bra and pull-over garment from seated position with set-up    LE Dressing  LE Dressing: Yes  LE Dressing Adaptive Equipment: Reacher, Sock aide  Pants Level of Assistance: Minimum assistance  Sock Level of Assistance: Close supervision, Minimal verbal cues  LE Dressing Where Assessed: Edge of bed  LE Dressing Comments: verbal instruction and demonstration provided for use of AE. Pt able to verbalize and demonstrate understanding by don/doff pants and socks from seated position with increased time. Education provided to pt and spouse on adaptic bag technique for donning of compression stockings. Pt would benefit from elastic shoe strings upon home-going.    Toileting  Toileting Comments: verbal instruction and demonstration provided for compensatory  movements for posterior hygiene completion with hip precautions. Pt able to complete all aspects of toileting with distant supervision and increased time  Functional Standing Tolerance:     Bed Mobility/Transfers: Bed Mobility  Bed Mobility: Yes  Bed Mobility 1  Bed Mobility 1: Supine to sitting  Level of Assistance 1: Close supervision  Bed Mobility Comments 1: increased time required for task completion- HOB elevated to maximum height    Transfers  Transfer: Yes  Transfer 1  Trials/Comments 1: sit<>stands completed from various surface heights such as standard EOB, chair and minimally elevated toilet seat heights with FWW and close supervision       Tub Transfers  Tub Transfers Comments: education and simulation completed on tub bench transfer    Car Transfers  Car Transfers Comments: verbal instruction and simulation completed for car transfer. Pt and spouse able to verbalize and demonstrate understanding      Outcome Measures:[facility] Daily Activity  Putting on and taking off regular lower body clothing: A little  Bathing (including washing, rinsing, drying): A little  Putting on and taking off regular upper body clothing: A little  Toileting, which includes using toilet, bedpan or urinal: A little  Taking care of personal grooming such as brushing teeth: None  Eating Meals: None  Daily Activity - Total Score: 20        Education Documentation  Handouts, taught by ABENA Campbell at 1/16/2024  1:50 PM.  Learner: Significant Other, Patient  Readiness: Acceptance  Method: Explanation, Demonstration  Response: Verbalizes Understanding, Demonstrated Understanding    Precautions, taught by ABENA Campbell at 1/16/2024  1:50 PM.  Learner: Significant Other, Patient  Readiness: Acceptance  Method: Explanation, Demonstration  Response: Verbalizes Understanding, Demonstrated Understanding    ADL Training, taught by ABENA Campbell at 1/16/2024  1:50 PM.  Learner: Significant Other, Patient  Readiness:  Acceptance  Method: Explanation, Demonstration  Response: Verbalizes Understanding, Demonstrated Understanding    Education Comments  Education reviewed on role of OT/ POC, importance of OT to maximize safety and independence during functional tasks and safety awareness throughout functional tasks/ transfers. Questions, comments and concerns addressed regarding OT.      Goals:  Encounter Problems       Encounter Problems (Active)       Mobility       Bed mobility including supine to sit and sit to supine with supervision. (Met)       Start:  01/11/24    Expected End:  01/25/24    Resolved:  01/16/24         Ambulate 60 feet with rolling walker and supervision. (Progressing)       Start:  01/11/24    Expected End:  01/25/24            Negotiate 3 steps with single handrail +/- straight cane and min assist. (Met)       Start:  01/11/24    Expected End:  01/25/24    Resolved:  01/16/24            OT Goals       Pt will perform functional mobility household distance at mod indep level with RW  (Progressing)       Start:  01/15/24    Expected End:  01/27/24            Pt will complete ADL tasks with Mod I, using AE as needed, in order to complete self-care tasks.  (Progressing)       Start:  01/15/24    Expected End:  01/27/24            Pt will perform functional transfers at mod ind level with RW  (Progressing)       Start:  01/15/24    Expected End:  01/27/24                  Encounter Problems (Resolved)       OT Goals       Pt will complete all functional transfers and mobility with mod indep using a RW. (Not met)       Start:  01/11/24    Expected End:  01/25/24    Resolved:  01/15/24         Pt will complete all ADL's/IADL's with mod indep using a device as needed. (Not met)       Start:  01/11/24    Expected End:  01/25/24    Resolved:  01/15/24         Pt will tolerate functional mobility for a household distance using a RW with mod indep. (Not met)       Start:  01/11/24    Expected End:  01/25/24    Resolved:   01/15/24            Transfers       Transfers including sit to stand and stand to sit with supervision. (Met)       Start:  01/11/24    Expected End:  01/25/24    Resolved:  01/16/24

## 2024-01-16 NOTE — CARE PLAN
The patient's goals for the shift include      The clinical goals for the shift include Pain control, increased mobility    Over the shift, the patient did not make progress toward the following goals. Barriers to progression include . Recommendations to address these barriers include .

## 2024-01-16 NOTE — DISCHARGE SUMMARY
Discharge Diagnosis  Primary osteoarthritis of left hip    Issues Requiring Follow-Up    left total hip replacement    Test Results Pending At Discharge  Pending Labs       No current pending labs.            Hospital Course    History of left total hip replacement with subsequent subluxation which required closed reduction.  This was reduced successfully in a closed manner and the patient has improved with physical    Pertinent Physical Exam At Time of Discharge  Physical Exam   see progress note of 1-  Home Medications     Medication List      START taking these medications     digoxin 125 MCG tablet; Commonly known as: Lanoxin; Take 1 tablet (125   mcg) by mouth once daily. Do not start before January 17, 2024.; Start   taking on: January 17, 2024   oxyCODONE-acetaminophen 5-325 mg tablet; Commonly known as: Percocet;   Take 1 tablet by mouth every 6 hours if needed for moderate pain (4 - 6)   for up to 7 days.     CONTINUE taking these medications     Eliquis 5 mg tablet; Generic drug: apixaban   fluticasone 50 mcg/actuation nasal spray; Commonly known as: Flonase   metoprolol tartrate 25 mg tablet; Commonly known as: Lopressor   sotalol 80 mg tablet; Commonly known as: Betapace     STOP taking these medications     chlorhexidine 0.12 % solution; Commonly known as: Peridex   cholecalciferol 50 MCG (2000 UT) tablet; Commonly known as: Vitamin D-3   famotidine 20 mg tablet; Commonly known as: Pepcid   gabapentin 100 mg capsule; Commonly known as: Neurontin   Prolia 60 mg/mL syringe; Generic drug: denosumab       Outpatient Follow-Up  Future Appointments   Date Time Provider Department Center   1/31/2024 10:00 AM Brigida Mckeon PA-C [id number] [facility]   4/16/2024 10:30 AM German Espinoza MD [id number] [facility]       Tirso Medina MD

## 2024-01-16 NOTE — NURSING NOTE
No change from previous assessment. Pt resting comfortably in bed with call light in reach. Dressing intact to left hip.

## 2024-01-16 NOTE — PROGRESS NOTES
Ruth Veloz is a 73 y.o. female on day 3 of admission presenting with Primary osteoarthritis of left hip.      Subjective   Patient seen and examined.   bedside.   Denies chest pain or shortness of breath.  No acute distress.  Is scheduled to go home with home health care today.    Objective     Last Recorded Vitals  BP (!) 132/43 (BP Location: Right arm, Patient Position: Lying)   Pulse 60   Temp 36.6 °C (97.9 °F) (Oral)   Resp 16   Wt 58 kg (127 lb 13.9 oz)   SpO2 100%   Intake/Output last 3 Shifts:    Intake/Output Summary (Last 24 hours) at 1/16/2024 1344  Last data filed at 1/16/2024 1006  Gross per 24 hour   Intake 1155 ml   Output 1900 ml   Net -745 ml         Admission Weight  Weight: 58 kg (127 lb 13.9 oz) (01/10/24 1857)    Daily Weight  01/10/24 : 58 kg (127 lb 13.9 oz)    Image Results  ECG 12 lead  Atrial flutter with variable AV block with premature ventricular or aberrantly conducted complexes  Minimal voltage criteria for LVH, may be normal variant  Nonspecific T wave abnormality  Abnormal ECG  No previous ECGs available  Confirmed by German Oneal ([id number]) on 1/15/2024 11:23:52 AM  ECG 12 Lead  Normal sinus rhythm  Nonspecific T wave abnormality  Abnormal ECG  When compared with ECG of 15-SATINDER-2024 06:15, (unconfirmed)  Questionable change in QRS axis  Nonspecific T wave abnormality now evident in Anterior leads  ECG 12 lead  Normal sinus rhythm  Minimal voltage criteria for LVH, may be normal variant  Borderline ECG  When compared with ECG of 14-JAN-2024 06:04, (unconfirmed)  Sinus rhythm has replaced Atrial flutter  Vent. rate has decreased BY  36 BPM  Questionable change in QRS axis  QT has shortened      Physical Exam  Constitutional:       Appearance: Normal appearance.   HENT:      Head: Normocephalic and atraumatic.      Nose: Nose normal.      Mouth/Throat:      Mouth: Mucous membranes are moist.   Eyes:      Pupils: Pupils are equal, round, and reactive to light.    Cardiovascular:      Rate and Rhythm: Normal rate.      Pulses: Normal pulses.      Heart sounds: Normal heart sounds.   Pulmonary:      Effort: Pulmonary effort is normal.      Breath sounds: Normal breath sounds.   Abdominal:      General: Bowel sounds are normal.      Palpations: Abdomen is soft.   Musculoskeletal:      Cervical back: Normal range of motion.      Comments: Range of motion left lower extremity limited due to hip replacement   Skin:     General: Skin is warm and dry.      Capillary Refill: Capillary refill takes less than 2 seconds.      Comments: Left hip dressing clean dry intact   Neurological:      Mental Status: She is alert and oriented to person, place, and time.   Psychiatric:         Mood and Affect: Mood normal.         Relevant Results               Assessment/Plan        Principal Problem:    Primary osteoarthritis of left hip  Active Problems:    Aortic valve regurgitation    Gastroesophageal reflux disease    Paroxysmal atrial fibrillation (CMS/[facility])    Pure hypercholesterolemia    Mitral regurgitation    Dislocation of hip joint prosthesis, initial encounter (CMS/[facility])    Dislocation of hip prosthesis (CMS/[facility])    Urinary retention  -Resolved     Paroxysmal afib  -Converted to normal sinus rhythm overnight  - sotalol, eliquis restarted  - metoprolol prn  -Cardiology following, have now signed off     Arthroplasty of left hip  - management/pain management per Dr. Medina  -Status post left total hip arthroplasty on 1/10/2024  -Closed reduction 1/13/2024     Encourage IS  PT/OT    No acute medical issues at this time.  Will sign off.  Thank you for this consultation.    GRACIE Hung-CNP

## 2024-01-17 ENCOUNTER — HOME CARE VISIT (OUTPATIENT)
Dept: HOME HEALTH SERVICES | Facility: HOME HEALTH | Age: 74
End: 2024-01-17
Payer: MEDICARE

## 2024-01-17 VITALS — SYSTOLIC BLOOD PRESSURE: 116 MMHG | DIASTOLIC BLOOD PRESSURE: 96 MMHG | TEMPERATURE: 98 F | HEART RATE: 74 BPM

## 2024-01-17 PROCEDURE — G0151 HHCP-SERV OF PT,EA 15 MIN: HCPCS | Mod: HHH

## 2024-01-17 PROCEDURE — 169592 NO-PAY CLAIM PROCEDURE

## 2024-01-17 PROCEDURE — 1090000001 HH PPS REVENUE CREDIT

## 2024-01-17 PROCEDURE — 0023 HH SOC

## 2024-01-17 PROCEDURE — 1090000002 HH PPS REVENUE DEBIT

## 2024-01-17 ASSESSMENT — PAIN SCALES - PAIN ASSESSMENT IN ADVANCED DEMENTIA (PAINAD)
FACIALEXPRESSION: 0
NEGVOCALIZATION: 0
BREATHING: 0
BODYLANGUAGE: 0 - RELAXED.
CONSOLABILITY: 0 - NO NEED TO CONSOLE.
BODYLANGUAGE: 0
CONSOLABILITY: 0
TOTALSCORE: 0
NEGVOCALIZATION: 0 - NONE.
FACIALEXPRESSION: 0 - SMILING OR INEXPRESSIVE.

## 2024-01-17 ASSESSMENT — ACTIVITIES OF DAILY LIVING (ADL)
ENTERING_EXITING_HOME: MINIMUM ASSIST
OASIS_M1830: 05
AMBULATION_DISTANCE/DURATION_TOLERATED: 50

## 2024-01-17 ASSESSMENT — ENCOUNTER SYMPTOMS
LOWEST PAIN SEVERITY IN PAST 24 HOURS: 2/10
PAIN: 1
DEPRESSION: 1
PAIN LOCATION: LEFT HIP
PERSON REPORTING PAIN: PATIENT
LOSS OF SENSATION IN FEET: 0
OCCASIONAL FEELINGS OF UNSTEADINESS: 1
PAIN LOCATION - PAIN SEVERITY: 4/10
HYPERTENSION: 1
HIGHEST PAIN SEVERITY IN PAST 24 HOURS: 7/10
PAIN LOCATION - EXACERBATING FACTORS: SITTING

## 2024-01-18 ENCOUNTER — HOME CARE VISIT (OUTPATIENT)
Dept: HOME HEALTH SERVICES | Facility: HOME HEALTH | Age: 74
End: 2024-01-18
Payer: MEDICARE

## 2024-01-18 DIAGNOSIS — I48.0 PAROXYSMAL ATRIAL FIBRILLATION (MULTI): Primary | ICD-10-CM

## 2024-01-18 PROCEDURE — 1090000001 HH PPS REVENUE CREDIT

## 2024-01-18 PROCEDURE — 1090000002 HH PPS REVENUE DEBIT

## 2024-01-19 ENCOUNTER — HOME CARE VISIT (OUTPATIENT)
Dept: HOME HEALTH SERVICES | Facility: HOME HEALTH | Age: 74
End: 2024-01-19
Payer: MEDICARE

## 2024-01-19 VITALS — TEMPERATURE: 97.6 F

## 2024-01-19 PROCEDURE — 1090000002 HH PPS REVENUE DEBIT

## 2024-01-19 PROCEDURE — 1090000001 HH PPS REVENUE CREDIT

## 2024-01-19 PROCEDURE — G0151 HHCP-SERV OF PT,EA 15 MIN: HCPCS | Mod: HHH

## 2024-01-19 ASSESSMENT — ENCOUNTER SYMPTOMS
HIGHEST PAIN SEVERITY IN PAST 24 HOURS: 8/10
LOWEST PAIN SEVERITY IN PAST 24 HOURS: 2/10
PAIN LOCATION - PAIN SEVERITY: 2/10
PAIN: 1
SUBJECTIVE PAIN PROGRESSION: GRADUALLY IMPROVING
PAIN LOCATION: LEFT HIP

## 2024-01-20 PROCEDURE — 1090000001 HH PPS REVENUE CREDIT

## 2024-01-20 PROCEDURE — 1090000002 HH PPS REVENUE DEBIT

## 2024-01-21 PROCEDURE — 1090000001 HH PPS REVENUE CREDIT

## 2024-01-21 PROCEDURE — 1090000002 HH PPS REVENUE DEBIT

## 2024-01-21 RX ORDER — APIXABAN 5 MG/1
5 TABLET, FILM COATED ORAL 2 TIMES DAILY
Qty: 180 TABLET | Refills: 3 | Status: SHIPPED | OUTPATIENT
Start: 2024-01-21

## 2024-01-22 ENCOUNTER — TELEPHONE (OUTPATIENT)
Dept: ORTHOPEDIC SURGERY | Facility: CLINIC | Age: 74
End: 2024-01-22
Payer: MEDICARE

## 2024-01-22 DIAGNOSIS — Z96.649 DISLOCATION OF HIP JOINT PROSTHESIS, INITIAL ENCOUNTER (CMS-HCC): ICD-10-CM

## 2024-01-22 DIAGNOSIS — T84.029A DISLOCATION OF HIP JOINT PROSTHESIS, INITIAL ENCOUNTER (CMS-HCC): ICD-10-CM

## 2024-01-22 PROCEDURE — 1090000001 HH PPS REVENUE CREDIT

## 2024-01-22 PROCEDURE — 1090000002 HH PPS REVENUE DEBIT

## 2024-01-22 RX ORDER — OXYCODONE AND ACETAMINOPHEN 5; 325 MG/1; MG/1
1 TABLET ORAL EVERY 6 HOURS PRN
Qty: 28 TABLET | Refills: 0 | Status: SHIPPED | OUTPATIENT
Start: 2024-01-22 | End: 2024-01-29

## 2024-01-23 ENCOUNTER — HOME CARE VISIT (OUTPATIENT)
Dept: HOME HEALTH SERVICES | Facility: HOME HEALTH | Age: 74
End: 2024-01-23
Payer: MEDICARE

## 2024-01-23 VITALS — TEMPERATURE: 97.7 F

## 2024-01-23 PROCEDURE — 1090000002 HH PPS REVENUE DEBIT

## 2024-01-23 PROCEDURE — G0151 HHCP-SERV OF PT,EA 15 MIN: HCPCS | Mod: HHH

## 2024-01-23 PROCEDURE — 1090000001 HH PPS REVENUE CREDIT

## 2024-01-23 ASSESSMENT — ENCOUNTER SYMPTOMS
PAIN: 1
SUBJECTIVE PAIN PROGRESSION: UNCHANGED
PERSON REPORTING PAIN: PATIENT
PAIN LOCATION - PAIN SEVERITY: 2/10
HIGHEST PAIN SEVERITY IN PAST 24 HOURS: 8/10
PAIN LOCATION: LEFT HIP
LOWEST PAIN SEVERITY IN PAST 24 HOURS: 2/10

## 2024-01-24 ENCOUNTER — TELEPHONE (OUTPATIENT)
Dept: ORTHOPEDIC SURGERY | Facility: CLINIC | Age: 74
End: 2024-01-24
Payer: MEDICARE

## 2024-01-24 LAB
ATRIAL RATE: 70 BPM
ATRIAL RATE: 73 BPM
P AXIS: -3 DEGREES
P AXIS: 53 DEGREES
P OFFSET: 212 MS
P OFFSET: 213 MS
P ONSET: 149 MS
P ONSET: 149 MS
PR INTERVAL: 142 MS
PR INTERVAL: 144 MS
Q ONSET: 220 MS
Q ONSET: 221 MS
QRS COUNT: 11 BEATS
QRS COUNT: 12 BEATS
QRS DURATION: 74 MS
QRS DURATION: 74 MS
QT INTERVAL: 360 MS
QT INTERVAL: 364 MS
QTC CALCULATION(BAZETT): 393 MS
QTC CALCULATION(BAZETT): 396 MS
QTC FREDERICIA: 383 MS
QTC FREDERICIA: 384 MS
R AXIS: -14 DEGREES
R AXIS: 47 DEGREES
T AXIS: -23 DEGREES
T AXIS: 59 DEGREES
T OFFSET: 400 MS
T OFFSET: 403 MS
VENTRICULAR RATE: 70 BPM
VENTRICULAR RATE: 73 BPM

## 2024-01-24 PROCEDURE — 1090000001 HH PPS REVENUE CREDIT

## 2024-01-24 PROCEDURE — 1090000002 HH PPS REVENUE DEBIT

## 2024-01-24 NOTE — TELEPHONE ENCOUNTER
Patient called and states that her physical therapy asked her to call us.  She has quarter size discharge with no redness.  She is doing well and we did go over constipation medication again.  She is to call 1/25/2024 to let us know if discharge is any worse

## 2024-01-25 PROCEDURE — 1090000001 HH PPS REVENUE CREDIT

## 2024-01-25 PROCEDURE — 1090000002 HH PPS REVENUE DEBIT

## 2024-01-25 NOTE — TELEPHONE ENCOUNTER
Patient called again this morning.  Please call her ,  she is upset due to the constipation and the drainage of the hip being quarter size and draining yellow. But will not come in due to constipation, want to talk to nurse or surgeon

## 2024-01-25 NOTE — TELEPHONE ENCOUNTER
Call to patient. She has had two bowel movements. I instructed her regarding stopping taking the narcotic as this is making her more constipated. She states that the incision is not warm, no pus, no erythema. She states that there is mild clear drainage. Offered patient sooner appointment. She cannot come in. Has appointment the 31st. Instructed to keep that appointment. Come in sooner if its worse.

## 2024-01-26 ENCOUNTER — HOME CARE VISIT (OUTPATIENT)
Dept: HOME HEALTH SERVICES | Facility: HOME HEALTH | Age: 74
End: 2024-01-26
Payer: MEDICARE

## 2024-01-26 VITALS — TEMPERATURE: 97.5 F

## 2024-01-26 PROCEDURE — G0151 HHCP-SERV OF PT,EA 15 MIN: HCPCS | Mod: HHH

## 2024-01-26 PROCEDURE — 1090000002 HH PPS REVENUE DEBIT

## 2024-01-26 PROCEDURE — G0180 MD CERTIFICATION HHA PATIENT: HCPCS | Performed by: INTERNAL MEDICINE

## 2024-01-26 PROCEDURE — 1090000001 HH PPS REVENUE CREDIT

## 2024-01-26 ASSESSMENT — ENCOUNTER SYMPTOMS
PAIN LOCATION: LEFT HIP
PAIN: 1
HIGHEST PAIN SEVERITY IN PAST 24 HOURS: 7/10
PAIN LOCATION - PAIN SEVERITY: 1/10
LOWEST PAIN SEVERITY IN PAST 24 HOURS: 1/10
PERSON REPORTING PAIN: PATIENT
SUBJECTIVE PAIN PROGRESSION: GRADUALLY IMPROVING

## 2024-01-27 PROCEDURE — 1090000001 HH PPS REVENUE CREDIT

## 2024-01-27 PROCEDURE — 1090000002 HH PPS REVENUE DEBIT

## 2024-01-28 PROCEDURE — 1090000001 HH PPS REVENUE CREDIT

## 2024-01-28 PROCEDURE — 1090000002 HH PPS REVENUE DEBIT

## 2024-01-29 ENCOUNTER — OFFICE VISIT (OUTPATIENT)
Dept: ORTHOPEDIC SURGERY | Facility: CLINIC | Age: 74
End: 2024-01-29
Payer: MEDICARE

## 2024-01-29 ENCOUNTER — HOSPITAL ENCOUNTER (OUTPATIENT)
Dept: RADIOLOGY | Facility: CLINIC | Age: 74
Discharge: HOME | End: 2024-01-29
Payer: MEDICARE

## 2024-01-29 VITALS — WEIGHT: 127.87 LBS | BODY MASS INDEX: 24.17 KG/M2

## 2024-01-29 DIAGNOSIS — Z96.642 S/P HIP REPLACEMENT, LEFT: ICD-10-CM

## 2024-01-29 DIAGNOSIS — L03.116 CELLULITIS OF LEFT HIP: ICD-10-CM

## 2024-01-29 DIAGNOSIS — M25.552 LEFT HIP PAIN: Primary | ICD-10-CM

## 2024-01-29 DIAGNOSIS — Z96.649 STATUS POST HIP REPLACEMENT: ICD-10-CM

## 2024-01-29 LAB
ATRIAL RATE: 150 BPM
Q ONSET: 222 MS
QRS COUNT: 23 BEATS
QRS DURATION: 70 MS
QT INTERVAL: 336 MS
QTC CALCULATION(BAZETT): 516 MS
QTC FREDERICIA: 448 MS
R AXIS: 73 DEGREES
T AXIS: 260 DEGREES
T OFFSET: 390 MS
VENTRICULAR RATE: 142 BPM

## 2024-01-29 PROCEDURE — 73502 X-RAY EXAM HIP UNI 2-3 VIEWS: CPT | Mod: LEFT SIDE | Performed by: ORTHOPAEDIC SURGERY

## 2024-01-29 PROCEDURE — 1090000001 HH PPS REVENUE CREDIT

## 2024-01-29 PROCEDURE — 73502 X-RAY EXAM HIP UNI 2-3 VIEWS: CPT | Mod: LT

## 2024-01-29 PROCEDURE — 99024 POSTOP FOLLOW-UP VISIT: CPT | Performed by: PHYSICIAN ASSISTANT

## 2024-01-29 PROCEDURE — 1090000002 HH PPS REVENUE DEBIT

## 2024-01-29 PROCEDURE — 1126F AMNT PAIN NOTED NONE PRSNT: CPT | Performed by: PHYSICIAN ASSISTANT

## 2024-01-29 PROCEDURE — 1160F RVW MEDS BY RX/DR IN RCRD: CPT | Performed by: PHYSICIAN ASSISTANT

## 2024-01-29 PROCEDURE — 1036F TOBACCO NON-USER: CPT | Performed by: PHYSICIAN ASSISTANT

## 2024-01-29 PROCEDURE — 1157F ADVNC CARE PLAN IN RCRD: CPT | Performed by: PHYSICIAN ASSISTANT

## 2024-01-29 PROCEDURE — 1159F MED LIST DOCD IN RCRD: CPT | Performed by: PHYSICIAN ASSISTANT

## 2024-01-29 PROCEDURE — 1111F DSCHRG MED/CURRENT MED MERGE: CPT | Performed by: PHYSICIAN ASSISTANT

## 2024-01-29 RX ORDER — CLINDAMYCIN HYDROCHLORIDE 300 MG/1
300 CAPSULE ORAL 3 TIMES DAILY
Qty: 30 CAPSULE | Refills: 0 | Status: SHIPPED | OUTPATIENT
Start: 2024-01-29 | End: 2024-02-08

## 2024-01-29 ASSESSMENT — ENCOUNTER SYMPTOMS
OCCASIONAL FEELINGS OF UNSTEADINESS: 0
DEPRESSION: 0
LOSS OF SENSATION IN FEET: 0

## 2024-01-29 ASSESSMENT — PATIENT HEALTH QUESTIONNAIRE - PHQ9
2. FEELING DOWN, DEPRESSED OR HOPELESS: NOT AT ALL
SUM OF ALL RESPONSES TO PHQ9 QUESTIONS 1 AND 2: 0
1. LITTLE INTEREST OR PLEASURE IN DOING THINGS: NOT AT ALL

## 2024-01-29 ASSESSMENT — LIFESTYLE VARIABLES: TOTAL SCORE: 0

## 2024-01-29 ASSESSMENT — PAIN - FUNCTIONAL ASSESSMENT: PAIN_FUNCTIONAL_ASSESSMENT: 0-10

## 2024-01-29 ASSESSMENT — PAIN DESCRIPTION - DESCRIPTORS: DESCRIPTORS: ACHING

## 2024-01-29 ASSESSMENT — PAIN SCALES - GENERAL: PAINLEVEL_OUTOF10: 5 - MODERATE PAIN

## 2024-01-29 NOTE — PATIENT INSTRUCTIONS
Continue to rest, and ice your hip.  Continue your pain regimen.   Continue Physical therapy at home.   Remember to call our office for antibiotics before dental work.   Remember not to internally rotate your hip to avoid dislocation.   Use a cane or walker for support.   Do not kneel, twist, or squat and avoid heavy lifting.   We are giving you antibiotics to take.   Return in 2 weeks with Dr. Medina or sooner as needed

## 2024-01-29 NOTE — PROGRESS NOTES
Subjective      Past Surgical History:   Procedure Laterality Date    OTHER SURGICAL HISTORY  12/13/2021    Balloon sinuplasty    SMALL INTESTINE SURGERY  2001    twisted bowel repair          Patient History      This patient is s/p left hip replacement  done by Dr. Medina on 1/10/2024 and subsequent closed reduction of left hip prosthesis on 1-. They present today and state that their left pain is 4/10. They are slowly resuming their normal activities of daily living. She is working with home physical therapy. She is seen today walking with a walker. They deny chest pain, shortness of breath.     There were no vitals taken for this visit.     ROS  CARDIOLOGY:   Negative for chest pain, shortness of breath.   RESPIRATORY:   Negative for chest pain, shortness of breath.   MUSCULOSKELETAL:   See HPI for details.   NEUROLOGY:   Negative for tingling, numbness, weakness.      Physical exam: Left hip: incision is clean dry and well healing. There is mild serous drainage from the mid incision. No erythema, purulent drainage or foul smell noted. Left lower extremity is in good position. Nontender in the left calf. Neurovascular is intact. The patient is seen walking today with the use of a walker for support.     ECG 12 lead    Result Date: 1/29/2024  Atrial fibrillation with rapid ventricular response with premature ventricular or aberrantly conducted complexes Minimal voltage criteria for LVH, may be normal variant Nonspecific ST and T wave abnormality Abnormal ECG No previous ECGs available    ECG 12 lead    Result Date: 1/24/2024  Normal sinus rhythm Minimal voltage criteria for LVH, may be normal variant Borderline ECG Confirmed by Dell Pena ([id number]) on 1/24/2024 6:23:05 AM    ECG 12 Lead    Result Date: 1/24/2024  Normal sinus rhythm Nonspecific T wave abnormality Abnormal ECG Confirmed by Dell Pena ([id number]) on 1/24/2024 6:21:25 AM    ECG 12 lead    Result Date: 1/15/2024  Atrial flutter with variable AV  block with premature ventricular or aberrantly conducted complexes Minimal voltage criteria for LVH, may be normal variant Nonspecific T wave abnormality Abnormal ECG No previous ECGs available Confirmed by German Oneal ([id number]) on 1/15/2024 11:23:52 AM    [place] less than 1 hour    Result Date: 1/13/2024  These images are not reportable by radiology and will not be interpreted by  Radiologists.    XR hip left with pelvis when performed 1 view    Result Date: 1/11/2024  Interpreted By:  Benji Cat, STUDY: XR HIP LEFT WITH PELVIS WHEN PERFORMED 1 VIEW; ;  1/11/2024 6:15 pm   INDICATION: Signs/Symptoms:Reevaluate.   COMPARISON: 01/10/2024   ACCESSION NUMBER(S): [id number]   ORDERING CLINICIAN: SAIDA NORMAN   FINDINGS: There is again total left hip arthroplasty. The prosthetic left femoral head appears to be subluxated laterally to the prosthetic acetabular cup; allowing for differences in technique the subluxation appears to have worsened since the prior exam. No acute fracture is seen. There is no lucency around the hardware to suggest loosening. Postsurgical gas again overlies the soft tissues.       Persistent worsening lateral subluxation of the prosthetic left femoral head in relation to the prosthetic acetabular cup.   Signed by: Benji Cat 1/11/2024 6:24 PM Dictation workstation:   [id number]    XR hip left with pelvis when performed 1 view    Result Date: 1/10/2024  Interpreted By:  Ramon Gamble, STUDY: XR HIP LEFT WITH PELVIS WHEN PERFORMED 1 VIEW; ;  1/10/2024 3:55 pm   INDICATION: Signs/Symptoms:Post op hip.   COMPARISON: 11/09/2023   ACCESSION NUMBER(S): [id number]   ORDERING CLINICIAN: SADE ALTMAN   FINDINGS: Single limited view the left hip. Interval postsurgical changes of left total hip arthroplasty. There is soft tissue swelling and gas overlying the left hip.   On this single limited radiograph there appears to be slight lateral subluxation of the femoral head with respect to  the acetabular cup. Correlation and possible repeat radiographs is recommended.       See findings.     MACRO: None   Signed by: Ramon Gamble 1/10/2024 4:13 PM Dictation workstation:   [id number]      Ruth was seen today for post-op.  Diagnoses and all orders for this visit:  Left hip pain (Primary)  S/P hip replacement, left  Options are discussed with the patient in detail. The patient is given an antibiotic. The patient is instructed regarding total hip position and infection precautions, activity modification and risk for further injury with falling or trauma and to use a walker for support while walking, ice, provider directed at home gentle strengthening and ROM exercises, and the appropriate use of Tylenol as needed for pain with its potential adverse reactions and side effects. The patient understands. Return in 2 weeks with Dr. Medina for re-evaluation or sooner as needed. Please note that this report has been produced using speech recognition software.  It may contain errors related to grammar, punctuation or spelling.  Electronically signed, but not reviewed.

## 2024-01-30 ENCOUNTER — HOME CARE VISIT (OUTPATIENT)
Dept: HOME HEALTH SERVICES | Facility: HOME HEALTH | Age: 74
End: 2024-01-30
Payer: MEDICARE

## 2024-01-30 VITALS — TEMPERATURE: 97.9 F

## 2024-01-30 PROCEDURE — 1090000001 HH PPS REVENUE CREDIT

## 2024-01-30 PROCEDURE — 1090000002 HH PPS REVENUE DEBIT

## 2024-01-30 PROCEDURE — G0151 HHCP-SERV OF PT,EA 15 MIN: HCPCS | Mod: HHH

## 2024-01-30 ASSESSMENT — ENCOUNTER SYMPTOMS
HIGHEST PAIN SEVERITY IN PAST 24 HOURS: 6/10
PAIN LOCATION: LEFT HIP
PAIN: 1
PAIN LOCATION - PAIN SEVERITY: 1/10
LOWEST PAIN SEVERITY IN PAST 24 HOURS: 1/10

## 2024-01-31 ENCOUNTER — APPOINTMENT (OUTPATIENT)
Dept: ORTHOPEDIC SURGERY | Facility: CLINIC | Age: 74
End: 2024-01-31
Payer: MEDICARE

## 2024-01-31 PROCEDURE — 1090000001 HH PPS REVENUE CREDIT

## 2024-01-31 PROCEDURE — 1090000002 HH PPS REVENUE DEBIT

## 2024-02-01 ENCOUNTER — HOME CARE VISIT (OUTPATIENT)
Dept: HOME HEALTH SERVICES | Facility: HOME HEALTH | Age: 74
End: 2024-02-01
Payer: MEDICARE

## 2024-02-01 VITALS
TEMPERATURE: 97.5 F | HEART RATE: 62 BPM | OXYGEN SATURATION: 99 % | DIASTOLIC BLOOD PRESSURE: 70 MMHG | SYSTOLIC BLOOD PRESSURE: 138 MMHG

## 2024-02-01 PROCEDURE — G0152 HHCP-SERV OF OT,EA 15 MIN: HCPCS | Mod: HHH

## 2024-02-01 PROCEDURE — 1090000001 HH PPS REVENUE CREDIT

## 2024-02-01 PROCEDURE — G0151 HHCP-SERV OF PT,EA 15 MIN: HCPCS | Mod: HHH

## 2024-02-01 PROCEDURE — 1090000002 HH PPS REVENUE DEBIT

## 2024-02-01 ASSESSMENT — ENCOUNTER SYMPTOMS
PAIN LOCATION: LEFT HIP
LOWEST PAIN SEVERITY IN PAST 24 HOURS: 1/10
PAIN: 1
PAIN: 1
LOWEST PAIN SEVERITY IN PAST 24 HOURS: 2/10
HIGHEST PAIN SEVERITY IN PAST 24 HOURS: 5/10
PAIN LOCATION - PAIN SEVERITY: 2/10
PAIN SEVERITY GOAL: 0/10
PAIN LOCATION: LEFT HIP
HIGHEST PAIN SEVERITY IN PAST 24 HOURS: 5/10
PERSON REPORTING PAIN: PATIENT
PERSON REPORTING PAIN: PATIENT
PAIN LOCATION - PAIN SEVERITY: 2/10

## 2024-02-02 PROCEDURE — 1090000002 HH PPS REVENUE DEBIT

## 2024-02-02 PROCEDURE — 1090000001 HH PPS REVENUE CREDIT

## 2024-02-03 PROCEDURE — 1090000002 HH PPS REVENUE DEBIT

## 2024-02-03 PROCEDURE — 1090000001 HH PPS REVENUE CREDIT

## 2024-02-04 DIAGNOSIS — I48.0 PAROXYSMAL ATRIAL FIBRILLATION (MULTI): Primary | ICD-10-CM

## 2024-02-04 PROCEDURE — 1090000001 HH PPS REVENUE CREDIT

## 2024-02-04 PROCEDURE — 1090000002 HH PPS REVENUE DEBIT

## 2024-02-05 PROCEDURE — 1090000001 HH PPS REVENUE CREDIT

## 2024-02-05 PROCEDURE — 1090000002 HH PPS REVENUE DEBIT

## 2024-02-05 NOTE — TELEPHONE ENCOUNTER
Patient stopped anti-biotics on 2/2/2024, she states she is feeling much better with no discharge.  The antibiotics gave her hurt burn

## 2024-02-06 ENCOUNTER — HOME CARE VISIT (OUTPATIENT)
Dept: HOME HEALTH SERVICES | Facility: HOME HEALTH | Age: 74
End: 2024-02-06
Payer: MEDICARE

## 2024-02-06 VITALS
OXYGEN SATURATION: 98 % | HEART RATE: 59 BPM | TEMPERATURE: 98 F | SYSTOLIC BLOOD PRESSURE: 132 MMHG | DIASTOLIC BLOOD PRESSURE: 70 MMHG

## 2024-02-06 PROCEDURE — G0152 HHCP-SERV OF OT,EA 15 MIN: HCPCS | Mod: HHH

## 2024-02-06 PROCEDURE — 1090000001 HH PPS REVENUE CREDIT

## 2024-02-06 PROCEDURE — 1090000002 HH PPS REVENUE DEBIT

## 2024-02-06 RX ORDER — SOTALOL HYDROCHLORIDE 80 MG/1
TABLET ORAL
Qty: 135 TABLET | Refills: 3 | Status: SHIPPED | OUTPATIENT
Start: 2024-02-06

## 2024-02-06 ASSESSMENT — ENCOUNTER SYMPTOMS
HIGHEST PAIN SEVERITY IN PAST 24 HOURS: 3/10
LOWEST PAIN SEVERITY IN PAST 24 HOURS: 1/10
SUBJECTIVE PAIN PROGRESSION: GRADUALLY IMPROVING
PERSON REPORTING PAIN: PATIENT
PAIN SEVERITY GOAL: 0/10
PAIN: 1
PAIN LOCATION - PAIN QUALITY: MILD
PAIN LOCATION - PAIN SEVERITY: 1/10
PAIN LOCATION: LEFT HIP

## 2024-02-07 ENCOUNTER — HOME CARE VISIT (OUTPATIENT)
Dept: HOME HEALTH SERVICES | Facility: HOME HEALTH | Age: 74
End: 2024-02-07
Payer: MEDICARE

## 2024-02-07 VITALS — TEMPERATURE: 98.2 F

## 2024-02-07 PROCEDURE — 1090000001 HH PPS REVENUE CREDIT

## 2024-02-07 PROCEDURE — G0151 HHCP-SERV OF PT,EA 15 MIN: HCPCS | Mod: HHH

## 2024-02-07 PROCEDURE — 1090000002 HH PPS REVENUE DEBIT

## 2024-02-07 ASSESSMENT — ENCOUNTER SYMPTOMS
PAIN: 1
MUSCLE WEAKNESS: 1
PAIN LOCATION: LEFT HIP
HIGHEST PAIN SEVERITY IN PAST 24 HOURS: 3/10
PAIN LOCATION - PAIN SEVERITY: 2/10
LOWEST PAIN SEVERITY IN PAST 24 HOURS: 0/10

## 2024-02-07 ASSESSMENT — ACTIVITIES OF DAILY LIVING (ADL)
HOME_HEALTH_OASIS: 01
OASIS_M1830: 03

## 2024-02-09 ENCOUNTER — PHARMACY VISIT (OUTPATIENT)
Dept: PHARMACY | Facility: CLINIC | Age: 74
End: 2024-02-09
Payer: COMMERCIAL

## 2024-02-09 PROBLEM — S73.109A SPRAIN OF HIP: Status: ACTIVE | Noted: 2024-02-09

## 2024-02-09 PROBLEM — Z98.890 HISTORY OF REPAIR OF HIP JOINT: Status: ACTIVE | Noted: 2024-02-09

## 2024-02-09 PROBLEM — Z96.649 DISLOCATION OF HIP JOINT PROSTHESIS (CMS-HCC): Status: ACTIVE | Noted: 2023-11-09

## 2024-02-09 PROBLEM — J33.0 POLYP OF NASAL CAVITY: Status: ACTIVE | Noted: 2023-09-17

## 2024-02-09 PROBLEM — M19.19: Status: ACTIVE | Noted: 2023-12-27

## 2024-02-09 PROBLEM — Z86.2 HISTORY OF IMMUNE DISORDER: Status: ACTIVE | Noted: 2024-02-09

## 2024-02-09 PROBLEM — M81.0 OSTEOPOROSIS: Status: ACTIVE | Noted: 2023-03-10

## 2024-02-09 PROBLEM — M25.559 ARTHRALGIA OF HIP: Status: ACTIVE | Noted: 2023-11-09

## 2024-02-09 PROBLEM — J01.90 ACUTE SINUSITIS: Status: ACTIVE | Noted: 2023-09-17

## 2024-02-09 PROBLEM — L03.116 CELLULITIS OF LEFT HIP: Status: ACTIVE | Noted: 2024-02-09

## 2024-02-09 PROBLEM — T84.029A DISLOCATION OF HIP JOINT PROSTHESIS (CMS-HCC): Status: ACTIVE | Noted: 2023-11-09

## 2024-02-09 PROCEDURE — RXMED WILLOW AMBULATORY MEDICATION CHARGE

## 2024-02-09 RX ORDER — DOXYCYCLINE 100 MG/1
CAPSULE ORAL EVERY 12 HOURS
COMMUNITY
Start: 2021-12-13 | End: 2024-02-28

## 2024-02-09 RX ORDER — DEXLANSOPRAZOLE 60 MG/1
1 CAPSULE, DELAYED RELEASE ORAL
COMMUNITY
End: 2024-02-28

## 2024-02-09 RX ORDER — METHYLPREDNISOLONE 4 MG/1
TABLET ORAL
COMMUNITY
Start: 2021-12-13 | End: 2024-02-28

## 2024-02-12 ENCOUNTER — HOSPITAL ENCOUNTER (OUTPATIENT)
Dept: RADIOLOGY | Facility: CLINIC | Age: 74
Discharge: HOME | End: 2024-02-12
Payer: MEDICARE

## 2024-02-12 ENCOUNTER — OFFICE VISIT (OUTPATIENT)
Dept: ORTHOPEDIC SURGERY | Facility: CLINIC | Age: 74
End: 2024-02-12
Payer: MEDICARE

## 2024-02-12 VITALS — WEIGHT: 127.87 LBS | BODY MASS INDEX: 24.17 KG/M2

## 2024-02-12 DIAGNOSIS — Z96.642 S/P HIP REPLACEMENT, LEFT: ICD-10-CM

## 2024-02-12 DIAGNOSIS — Z96.649 STATUS POST HIP REPLACEMENT: ICD-10-CM

## 2024-02-12 DIAGNOSIS — M25.552 LEFT HIP PAIN: Primary | ICD-10-CM

## 2024-02-12 PROCEDURE — 73502 X-RAY EXAM HIP UNI 2-3 VIEWS: CPT | Mod: LT

## 2024-02-12 PROCEDURE — 1036F TOBACCO NON-USER: CPT | Performed by: ORTHOPAEDIC SURGERY

## 2024-02-12 PROCEDURE — 1160F RVW MEDS BY RX/DR IN RCRD: CPT | Performed by: ORTHOPAEDIC SURGERY

## 2024-02-12 PROCEDURE — 1111F DSCHRG MED/CURRENT MED MERGE: CPT | Performed by: ORTHOPAEDIC SURGERY

## 2024-02-12 PROCEDURE — 73502 X-RAY EXAM HIP UNI 2-3 VIEWS: CPT | Mod: LEFT SIDE | Performed by: ORTHOPAEDIC SURGERY

## 2024-02-12 PROCEDURE — 99024 POSTOP FOLLOW-UP VISIT: CPT | Performed by: ORTHOPAEDIC SURGERY

## 2024-02-12 PROCEDURE — 1157F ADVNC CARE PLAN IN RCRD: CPT | Performed by: ORTHOPAEDIC SURGERY

## 2024-02-12 PROCEDURE — 1159F MED LIST DOCD IN RCRD: CPT | Performed by: ORTHOPAEDIC SURGERY

## 2024-02-12 PROCEDURE — 1126F AMNT PAIN NOTED NONE PRSNT: CPT | Performed by: ORTHOPAEDIC SURGERY

## 2024-02-12 ASSESSMENT — PAIN SCALES - GENERAL: PAINLEVEL_OUTOF10: 3

## 2024-02-12 ASSESSMENT — ENCOUNTER SYMPTOMS
DEPRESSION: 0
OCCASIONAL FEELINGS OF UNSTEADINESS: 0
LOSS OF SENSATION IN FEET: 0

## 2024-02-12 ASSESSMENT — LIFESTYLE VARIABLES: TOTAL SCORE: 0

## 2024-02-12 ASSESSMENT — PAIN - FUNCTIONAL ASSESSMENT: PAIN_FUNCTIONAL_ASSESSMENT: 0-10

## 2024-02-12 ASSESSMENT — PAIN DESCRIPTION - DESCRIPTORS: DESCRIPTORS: ACHING

## 2024-02-12 NOTE — PATIENT INSTRUCTIONS
Continue to rest, and ice your hip.  Continue your pain regimen.   Continue Physical therapy on your own at home. You have a prescription for outpatient if you need it.   Remember to call our office for antibiotics before dental work.   Remember not to internally rotate your hip to avoid dislocation.   Use a cane or walker for support.   Do not kneel, twist, or squat and avoid heavy lifting.   We are giving you antibiotics to take.   Return  April 15th with ANAYA

## 2024-02-12 NOTE — PROGRESS NOTES
Subjective      Past Surgical History:   Procedure Laterality Date    HIP SURGERY Left 01/10/2024    total    OTHER SURGICAL HISTORY  12/13/2021    Balloon sinuplasty    SMALL INTESTINE SURGERY  2001    twisted bowel repair          Patient History      This patient is s/p left hip replacement  done by myself on 1/10/2024 and subsequent closed reduction of left hip prosthesis on 1-.  She presents today and states that her left pain is 3/10.  She  is slowly resuming  her normal activities of daily living. She is working with home physical therapy. She is seen today walking with a cane.  She denies chest pain, shortness of breath.     There were no vitals taken for this visit.     ROS  CARDIOLOGY:   Negative for chest pain, shortness of breath.   RESPIRATORY:   Negative for chest pain, shortness of breath.   MUSCULOSKELETAL:   See HPI for details.   NEUROLOGY:   Negative for tingling, numbness, weakness.      Physical exam: Left hip: incision is clean dry and well healing. No erythema, purulent drainage or foul smell noted. Left lower extremity is in good position. Nontender in the left calf. Neurovascular is intact. The patient is seen walking today with the use of a cane for support.     ECG 12 lead    Result Date: 1/29/2024  Atrial fibrillation with rapid ventricular response with premature ventricular or aberrantly conducted complexes Minimal voltage criteria for LVH, may be normal variant Nonspecific ST and T wave abnormality Abnormal ECG No previous ECGs available    ECG 12 lead    Result Date: 1/24/2024  Normal sinus rhythm Minimal voltage criteria for LVH, may be normal variant Borderline ECG Confirmed by Dell Pena ([id number]) on 1/24/2024 6:23:05 AM    ECG 12 Lead    Result Date: 1/24/2024  Normal sinus rhythm Nonspecific T wave abnormality Abnormal ECG Confirmed by Dell Pena ([id number]) on 1/24/2024 6:21:25 AM    ECG 12 lead    Result Date: 1/15/2024  Atrial flutter with variable AV block with premature  ventricular or aberrantly conducted complexes Minimal voltage criteria for LVH, may be normal variant Nonspecific T wave abnormality Abnormal ECG No previous ECGs available Confirmed by German Oneal ([id number]) on 1/15/2024 11:23:52 AM    [place] less than 1 hour    Result Date: 1/13/2024  These images are not reportable by radiology and will not be interpreted by  Radiologists.    XR hip left with pelvis  x-ray done and read in the office today is reviewed with the patient and her  in the office today and shows that there is a left total hip replacement in overall satisfactory position and alignment.    Result Date: 1/11/2024  Interpreted By:  Benji Cat, STUDY: XR HIP LEFT WITH PELVIS WHEN PERFORMED 1 VIEW; ;  1/11/2024 6:15 pm   INDICATION: Signs/Symptoms:Reevaluate.   COMPARISON: 01/10/2024   ACCESSION NUMBER(S): [id number]   ORDERING CLINICIAN: SAIDA NORMAN   FINDINGS: There is again total left hip arthroplasty. The prosthetic left femoral head appears to be subluxated laterally to the prosthetic acetabular cup; allowing for differences in technique the subluxation appears to have worsened since the prior exam. No acute fracture is seen. There is no lucency around the hardware to suggest loosening. Postsurgical gas again overlies the soft tissues.       Persistent worsening lateral subluxation of the prosthetic left femoral head in relation to the prosthetic acetabular cup.   Signed by: Benji Cta 1/11/2024 6:24 PM Dictation workstation:   [id number]    XR hip left with pelvis when performed 1 view    Result Date: 1/10/2024  Interpreted By:  Ramon Gamble, STUDY: XR HIP LEFT WITH PELVIS WHEN PERFORMED 1 VIEW; ;  1/10/2024 3:55 pm   INDICATION: Signs/Symptoms:Post op hip.   COMPARISON: 11/09/2023   ACCESSION NUMBER(S): [id number]   ORDERING CLINICIAN: SADE ALTMAN   FINDINGS: Single limited view the left hip. Interval postsurgical changes of left total hip arthroplasty. There is soft  tissue swelling and gas overlying the left hip.   On this single limited radiograph there appears to be slight lateral subluxation of the femoral head with respect to the acetabular cup. Correlation and possible repeat radiographs is recommended.       See findings.     MACRO: None   Signed by: Ramno Gamble 1/10/2024 4:13 PM Dictation workstation:   [id number]      Ruth was seen today for post-op.  Diagnoses and all orders for this visit:  Left hip pain (Primary)  S/P hip replacement, left  Options are discussed with the patient in detail. The patient is instructed regarding total hip position and infection precautions, activity modification and risk for further injury with falling or trauma and to use a cane for support while walking, ice, provider directed at home gentle strengthening and ROM exercises, and the appropriate use of Tylenol as needed for pain with its potential adverse reactions and side effects. The patient understands. Return in 6 weeks for re-evaluation or sooner as needed. Please note that this report has been produced using speech recognition software.  It may contain errors related to grammar, punctuation or spelling.  Electronically signed, but not reviewed.

## 2024-02-28 ENCOUNTER — OFFICE VISIT (OUTPATIENT)
Dept: CARDIOLOGY | Facility: CLINIC | Age: 74
End: 2024-02-28
Payer: MEDICARE

## 2024-02-28 VITALS
DIASTOLIC BLOOD PRESSURE: 70 MMHG | BODY MASS INDEX: 23.25 KG/M2 | SYSTOLIC BLOOD PRESSURE: 118 MMHG | HEART RATE: 63 BPM | OXYGEN SATURATION: 98 % | WEIGHT: 123 LBS

## 2024-02-28 DIAGNOSIS — Z96.649 DISLOCATION OF HIP JOINT PROSTHESIS, INITIAL ENCOUNTER (CMS-HCC): ICD-10-CM

## 2024-02-28 DIAGNOSIS — I48.0 PAROXYSMAL ATRIAL FIBRILLATION (MULTI): Primary | ICD-10-CM

## 2024-02-28 DIAGNOSIS — T84.029A DISLOCATION OF HIP JOINT PROSTHESIS, INITIAL ENCOUNTER (CMS-HCC): ICD-10-CM

## 2024-02-28 PROCEDURE — 1157F ADVNC CARE PLAN IN RCRD: CPT | Performed by: INTERNAL MEDICINE

## 2024-02-28 PROCEDURE — 1126F AMNT PAIN NOTED NONE PRSNT: CPT | Performed by: INTERNAL MEDICINE

## 2024-02-28 PROCEDURE — 99213 OFFICE O/P EST LOW 20 MIN: CPT | Performed by: INTERNAL MEDICINE

## 2024-02-28 PROCEDURE — 1036F TOBACCO NON-USER: CPT | Performed by: INTERNAL MEDICINE

## 2024-02-28 PROCEDURE — 1159F MED LIST DOCD IN RCRD: CPT | Performed by: INTERNAL MEDICINE

## 2024-02-28 PROCEDURE — 1160F RVW MEDS BY RX/DR IN RCRD: CPT | Performed by: INTERNAL MEDICINE

## 2024-02-28 RX ORDER — DIGOXIN 125 MCG
125 TABLET ORAL DAILY
Qty: 90 TABLET | Refills: 3 | Status: SHIPPED | OUTPATIENT
Start: 2024-02-28 | End: 2025-02-27

## 2024-02-28 ASSESSMENT — PATIENT HEALTH QUESTIONNAIRE - PHQ9
1. LITTLE INTEREST OR PLEASURE IN DOING THINGS: NOT AT ALL
2. FEELING DOWN, DEPRESSED OR HOPELESS: NOT AT ALL
SUM OF ALL RESPONSES TO PHQ9 QUESTIONS 1 AND 2: 0

## 2024-02-28 ASSESSMENT — ENCOUNTER SYMPTOMS
LOSS OF SENSATION IN FEET: 0
DEPRESSION: 0

## 2024-02-28 ASSESSMENT — PAIN SCALES - GENERAL: PAINLEVEL: 0-NO PAIN

## 2024-02-28 NOTE — PROGRESS NOTES
Subjective      Chief Complaint   Patient presents with    medication review           She was in the hospital for elective hip surgery.  She went into rapid afib and was felt since she had not been on the sotalol.  Was restarted and was placed on digoxin.  Since she has been home she will feel the afib about twice and does not last long.  She is not complaining of chest discomfort.  No complaints of PND or orthopnea.  The legs are not swelling on her.    Since she has been on the dig the episodes seem to be short lived She has had lightheadedness and is rare           ROS     Past Surgical History:   Procedure Laterality Date    HIP SURGERY Left 01/10/2024    total    OTHER SURGICAL HISTORY  12/13/2021    Balloon sinuplasty    SMALL INTESTINE SURGERY  2001    twisted bowel repair        Active Ambulatory Problems     Diagnosis Date Noted    Acute sinusitis 09/17/2023    Allergic rhinitis 09/17/2023    Aortic valve disease 09/17/2023    Aortic valve regurgitation 09/17/2023    Bursitis of right shoulder 09/17/2023    Cervicalgia 09/17/2023    Gastroesophageal reflux disease 09/17/2023    Cervix prolapsed into vagina 09/17/2023    Acquired coagulation disorder (CMS/HCC) 09/17/2023    Menopausal symptom 09/17/2023    Polyp of nasal cavity 09/17/2023    Paroxysmal atrial fibrillation (CMS/HCC) 09/17/2023    Pure hypercholesterolemia 09/17/2023    Spinal stenosis of lumbar region 09/17/2023    Vitamin D deficiency 09/17/2023    Left hip pain 11/09/2023    Osteoarthritis of left hip 11/09/2023    Preop cardiovascular exam 12/21/2023    Mitral regurgitation 01/10/2024    Dislocation of hip prosthesis (CMS/HCC) 11/09/2023    Dislocation of hip joint prosthesis (CMS/HCC) 11/09/2023    Cellulitis of left hip 02/09/2024    History of immune disorder 02/09/2024    History of repair of hip joint 02/09/2024    Osteoporosis 03/10/2023    Post-traumatic osteoarthritis, other specified site 12/27/2023    Sprain of hip 02/09/2024     S/P hip replacement, left 02/12/2024     Resolved Ambulatory Problems     Diagnosis Date Noted    No Resolved Ambulatory Problems     Past Medical History:   Diagnosis Date    A-fib (CMS/HCC)     GERD (gastroesophageal reflux disease)     Hip pain, acute, left     Osteopenia     Personal history of diseases of the blood and blood-forming organs and certain disorders involving the immune mechanism     Personal history of other diseases of the digestive system         Visit Vitals  /70   Pulse 63   Wt 55.8 kg (123 lb)   SpO2 98%   BMI 23.25 kg/m²   OB Status Postmenopausal   Smoking Status Never   BSA 1.55 m²        Objective     Constitutional:       Appearance: Healthy appearance.   Eyes:      Pupils: Pupils are equal, round, and reactive to light.   Neck:      Vascular: JVD normal.   Pulmonary:      Breath sounds: Normal breath sounds.   Cardiovascular:      PMI at left midclavicular line. Normal rate. Regular rhythm. Normal S1. Normal S2.       Murmurs: There is no murmur.      No gallop.  No click. No rub.   Pulses:     Intact distal pulses.   Edema:     Peripheral edema present.     Ankle: bilateral trace edema of the ankle.     Feet: bilateral trace edema of the feet.  Abdominal:      Palpations: Abdomen is soft.      Tenderness: There is no abdominal tenderness.   Musculoskeletal:      Extremities: No clubbing present.Skin:     General: Skin is warm and dry.   Neurological:      General: No focal deficit present.            Lab Review:         Lab Results   Component Value Date    CHOL 206 (H) 09/08/2023    CHOL 212 (H) 09/01/2022    CHOL 215 (H) 08/23/2021     Lab Results   Component Value Date    HDL 68 09/08/2023    HDL 76 09/01/2022    HDL 66 08/23/2021     Lab Results   Component Value Date    LDLCALC 112 09/08/2023    LDLCALC 116 09/01/2022    LDLCALC 131 (H) 08/23/2021     Lab Results   Component Value Date    TRIG 132 09/08/2023    TRIG 98 09/01/2022    TRIG 91 08/23/2021     No components found  "for: \"CHOLHDL\"     Assessment/Plan     Paroxysmal atrial fibrillation (CMS/HCC)  She was in the hosp and had p afib and is better.  No angina and no chf.  She is now on low dose of the dig and will leave it alone     "

## 2024-02-28 NOTE — ASSESSMENT & PLAN NOTE
She was in the hosp and had p afib and is better.  No angina and no chf.  She is now on low dose of the dig and will leave it alone

## 2024-03-01 ENCOUNTER — TELEPHONE (OUTPATIENT)
Dept: ORTHOPEDIC SURGERY | Facility: CLINIC | Age: 74
End: 2024-03-01
Payer: MEDICARE

## 2024-03-01 NOTE — TELEPHONE ENCOUNTER
HALEY CALLED WITH A COUPLE QUESTIONS:  Top of her foot on left side feels stiff, tight, swollen like ankle was. No bruising  Lt hip, replacement side, under her buttocks is sore. She is not sure if that is from dislocation.  She has been getting up every 2 hours to go to the bathroom. No smell, no burning    Phone is

## 2024-03-05 ENCOUNTER — HOSPITAL ENCOUNTER (OUTPATIENT)
Dept: RADIOLOGY | Facility: CLINIC | Age: 74
Discharge: HOME | End: 2024-03-05
Payer: MEDICARE

## 2024-03-05 ENCOUNTER — OFFICE VISIT (OUTPATIENT)
Dept: ORTHOPEDIC SURGERY | Facility: CLINIC | Age: 74
End: 2024-03-05
Payer: MEDICARE

## 2024-03-05 VITALS — BODY MASS INDEX: 23.26 KG/M2 | WEIGHT: 123.02 LBS

## 2024-03-05 DIAGNOSIS — Z96.649 STATUS POST HIP REPLACEMENT: ICD-10-CM

## 2024-03-05 DIAGNOSIS — M25.552 LEFT HIP PAIN: Primary | ICD-10-CM

## 2024-03-05 DIAGNOSIS — Z96.642 S/P HIP REPLACEMENT, LEFT: ICD-10-CM

## 2024-03-05 PROCEDURE — 1159F MED LIST DOCD IN RCRD: CPT | Performed by: PHYSICIAN ASSISTANT

## 2024-03-05 PROCEDURE — 1160F RVW MEDS BY RX/DR IN RCRD: CPT | Performed by: PHYSICIAN ASSISTANT

## 2024-03-05 PROCEDURE — 1157F ADVNC CARE PLAN IN RCRD: CPT | Performed by: PHYSICIAN ASSISTANT

## 2024-03-05 PROCEDURE — 99024 POSTOP FOLLOW-UP VISIT: CPT | Performed by: PHYSICIAN ASSISTANT

## 2024-03-05 PROCEDURE — 73502 X-RAY EXAM HIP UNI 2-3 VIEWS: CPT | Mod: LT

## 2024-03-05 PROCEDURE — 1126F AMNT PAIN NOTED NONE PRSNT: CPT | Performed by: PHYSICIAN ASSISTANT

## 2024-03-05 PROCEDURE — 73502 X-RAY EXAM HIP UNI 2-3 VIEWS: CPT | Mod: LEFT SIDE | Performed by: ORTHOPAEDIC SURGERY

## 2024-03-05 PROCEDURE — 1036F TOBACCO NON-USER: CPT | Performed by: PHYSICIAN ASSISTANT

## 2024-03-05 ASSESSMENT — PAIN - FUNCTIONAL ASSESSMENT: PAIN_FUNCTIONAL_ASSESSMENT: 0-10

## 2024-03-05 ASSESSMENT — ENCOUNTER SYMPTOMS
LOSS OF SENSATION IN FEET: 0
DEPRESSION: 0
OCCASIONAL FEELINGS OF UNSTEADINESS: 0

## 2024-03-05 ASSESSMENT — LIFESTYLE VARIABLES: TOTAL SCORE: 0

## 2024-03-05 ASSESSMENT — PAIN DESCRIPTION - DESCRIPTORS: DESCRIPTORS: ACHING

## 2024-03-05 ASSESSMENT — PAIN SCALES - GENERAL: PAINLEVEL_OUTOF10: 2

## 2024-03-05 NOTE — PATIENT INSTRUCTIONS
Continue to rest, and ice your hip.  Continue your pain regimen.   Continue Physical therapy on your own at home.  Remember to call our office for antibiotics before dental work.   Remember not to internally rotate your hip to avoid dislocation.   Use a cane or walker for support.   Do not kneel, twist, or squat and avoid heavy lifting.     Return  as scheduled with PC

## 2024-03-05 NOTE — PROGRESS NOTES
Subjective      Past Surgical History:   Procedure Laterality Date    HIP SURGERY Left 01/10/2024    total    OTHER SURGICAL HISTORY  12/13/2021    Balloon sinuplasty    SMALL INTESTINE SURGERY  2001    twisted bowel repair          Patient History      This patient is s/p left hip replacement  done by Dr. Colmenares on 1/10/2024 and subsequent closed reduction of left hip prosthesis on 1-.  She presents today and states that her left pain is 3/10.  She  is slowly resuming  her normal activities of daily living. She has finished working with home physical therapy and is doing exercises on her own.  She is seen today walking with a cane.  She denies chest pain, shortness of breath.  She does complain of urinary frequency.    There were no vitals taken for this visit.     ROS  CARDIOLOGY:   Negative for chest pain, shortness of breath.   RESPIRATORY:   Negative for chest pain, shortness of breath.   MUSCULOSKELETAL:   See HPI for details.   NEUROLOGY:   Negative for tingling, numbness, weakness.      Physical exam: Left hip: incision is clean dry and well healing. No erythema, purulent drainage or foul smell noted. Left lower extremity is in good position. Nontender in the left calf. Neurovascular is intact. The patient is seen walking today with the use of a cane for support.     ECG 12 lead    Result Date: 1/29/2024  Atrial fibrillation with rapid ventricular response with premature ventricular or aberrantly conducted complexes Minimal voltage criteria for LVH, may be normal variant Nonspecific ST and T wave abnormality Abnormal ECG No previous ECGs available    ECG 12 lead    Result Date: 1/24/2024  Normal sinus rhythm Minimal voltage criteria for LVH, may be normal variant Borderline ECG Confirmed by Dell Pena ([id number]) on 1/24/2024 6:23:05 AM    ECG 12 Lead    Result Date: 1/24/2024  Normal sinus rhythm Nonspecific T wave abnormality Abnormal ECG Confirmed by Dell Pena ([id number]) on 1/24/2024 6:21:25 AM    ECG  12 lead    Result Date: 1/15/2024  Atrial flutter with variable AV block with premature ventricular or aberrantly conducted complexes Minimal voltage criteria for LVH, may be normal variant Nonspecific T wave abnormality Abnormal ECG No previous ECGs available Confirmed by German Oneal ([id number]) on 1/15/2024 11:23:52 AM    [place] less than 1 hour    Result Date: 1/13/2024  These images are not reportable by radiology and will not be interpreted by  Radiologists.    XR hip left with pelvis  x-ray done and read in the office today is reviewed with the patient and her  in the office today and shows that there is a left total hip replacement in overall satisfactory position and alignment.    Result Date: 1/11/2024  Interpreted By:  Benji Cat, STUDY: XR HIP LEFT WITH PELVIS WHEN PERFORMED 1 VIEW; ;  1/11/2024 6:15 pm   INDICATION: Signs/Symptoms:Reevaluate.   COMPARISON: 01/10/2024   ACCESSION NUMBER(S): [id number]   ORDERING CLINICIAN: SAIDA NORMAN   FINDINGS: There is again total left hip arthroplasty. The prosthetic left femoral head appears to be subluxated laterally to the prosthetic acetabular cup; allowing for differences in technique the subluxation appears to have worsened since the prior exam. No acute fracture is seen. There is no lucency around the hardware to suggest loosening. Postsurgical gas again overlies the soft tissues.       Persistent worsening lateral subluxation of the prosthetic left femoral head in relation to the prosthetic acetabular cup.   Signed by: Benji Cat 1/11/2024 6:24 PM Dictation workstation:   [id number]    XR hip left with pelvis when performed 1 view    Result Date: 1/10/2024  Interpreted By:  Ramon Gamble, STUDY: XR HIP LEFT WITH PELVIS WHEN PERFORMED 1 VIEW; ;  1/10/2024 3:55 pm   INDICATION: Signs/Symptoms:Post op hip.   COMPARISON: 11/09/2023   ACCESSION NUMBER(S): [id number]   ORDERING CLINICIAN: SADE ALTMAN   FINDINGS: Single limited view the  left hip. Interval postsurgical changes of left total hip arthroplasty. There is soft tissue swelling and gas overlying the left hip.   On this single limited radiograph there appears to be slight lateral subluxation of the femoral head with respect to the acetabular cup. Correlation and possible repeat radiographs is recommended.       See findings.     MACRO: None   Signed by: Ramon Gamble 1/10/2024 4:13 PM Dictation workstation:   [id number]      Ruth was seen today for follow-up.  Diagnoses and all orders for this visit:  Left hip pain (Primary)  S/P hip replacement, left  Options are discussed with the patient in detail.  The patient is instructed to follow-up with her primary care physician regarding her urinary frequency.  The patient is instructed regarding total hip position and infection precautions, activity modification and risk for further injury with falling or trauma and to use a cane for support while walking, ice, provider directed at home gentle strengthening and ROM exercises, and the appropriate use of Tylenol as needed for pain with its potential adverse reactions and side effects. The patient understands. Return in 6 weeks for re-evaluation or sooner as needed. Please note that this report has been produced using speech recognition software.  It may contain errors related to grammar, punctuation or spelling.  Electronically signed, but not reviewed.

## 2024-03-25 ENCOUNTER — APPOINTMENT (OUTPATIENT)
Dept: ORTHOPEDIC SURGERY | Facility: CLINIC | Age: 74
End: 2024-03-25
Payer: MEDICARE

## 2024-04-10 ENCOUNTER — LAB (OUTPATIENT)
Dept: LAB | Facility: LAB | Age: 74
End: 2024-04-10
Payer: MEDICARE

## 2024-04-10 DIAGNOSIS — I48.0 PAROXYSMAL ATRIAL FIBRILLATION (MULTI): ICD-10-CM

## 2024-04-10 DIAGNOSIS — E78.00 PURE HYPERCHOLESTEROLEMIA, UNSPECIFIED: ICD-10-CM

## 2024-04-10 DIAGNOSIS — E55.9 VITAMIN D DEFICIENCY, UNSPECIFIED: Primary | ICD-10-CM

## 2024-04-10 LAB
25(OH)D3 SERPL-MCNC: 39 NG/ML (ref 31–100)
ALBUMIN SERPL-MCNC: 4.3 G/DL (ref 3.5–5)
ALP BLD-CCNC: 74 U/L (ref 35–125)
ALT SERPL-CCNC: 10 U/L (ref 5–40)
ANION GAP SERPL CALC-SCNC: 12 MMOL/L
AST SERPL-CCNC: 19 U/L (ref 5–40)
BILIRUB SERPL-MCNC: 0.5 MG/DL (ref 0.1–1.2)
BUN SERPL-MCNC: 18 MG/DL (ref 8–25)
CALCIUM SERPL-MCNC: 10.1 MG/DL (ref 8.5–10.4)
CHLORIDE SERPL-SCNC: 103 MMOL/L (ref 97–107)
CO2 SERPL-SCNC: 26 MMOL/L (ref 24–31)
CREAT SERPL-MCNC: 0.8 MG/DL (ref 0.4–1.6)
EGFRCR SERPLBLD CKD-EPI 2021: 77 ML/MIN/1.73M*2
ERYTHROCYTE [DISTWIDTH] IN BLOOD BY AUTOMATED COUNT: 13.4 % (ref 11.5–14.5)
GLUCOSE SERPL-MCNC: 76 MG/DL (ref 65–99)
HCT VFR BLD AUTO: 41.7 % (ref 36–46)
HGB BLD-MCNC: 12.9 G/DL (ref 12–16)
MCH RBC QN AUTO: 29.1 PG (ref 26–34)
MCHC RBC AUTO-ENTMCNC: 30.9 G/DL (ref 32–36)
MCV RBC AUTO: 94 FL (ref 80–100)
NRBC BLD-RTO: 0 /100 WBCS (ref 0–0)
PLATELET # BLD AUTO: 274 X10*3/UL (ref 150–450)
POTASSIUM SERPL-SCNC: 4.3 MMOL/L (ref 3.4–5.1)
PROT SERPL-MCNC: 6.8 G/DL (ref 5.9–7.9)
RBC # BLD AUTO: 4.44 X10*6/UL (ref 4–5.2)
SODIUM SERPL-SCNC: 141 MMOL/L (ref 133–145)
WBC # BLD AUTO: 5.8 X10*3/UL (ref 4.4–11.3)

## 2024-04-10 PROCEDURE — 36415 COLL VENOUS BLD VENIPUNCTURE: CPT

## 2024-04-10 PROCEDURE — 82306 VITAMIN D 25 HYDROXY: CPT

## 2024-04-10 PROCEDURE — 80053 COMPREHEN METABOLIC PANEL: CPT

## 2024-04-10 PROCEDURE — 85027 COMPLETE CBC AUTOMATED: CPT

## 2024-04-16 ENCOUNTER — OFFICE VISIT (OUTPATIENT)
Dept: PRIMARY CARE | Facility: CLINIC | Age: 74
End: 2024-04-16
Payer: MEDICARE

## 2024-04-16 VITALS
BODY MASS INDEX: 23.03 KG/M2 | TEMPERATURE: 97.6 F | SYSTOLIC BLOOD PRESSURE: 140 MMHG | OXYGEN SATURATION: 99 % | DIASTOLIC BLOOD PRESSURE: 54 MMHG | WEIGHT: 122 LBS | HEART RATE: 51 BPM | HEIGHT: 61 IN

## 2024-04-16 DIAGNOSIS — E78.00 PURE HYPERCHOLESTEROLEMIA: ICD-10-CM

## 2024-04-16 DIAGNOSIS — E55.9 VITAMIN D DEFICIENCY: ICD-10-CM

## 2024-04-16 DIAGNOSIS — Z00.00 ROUTINE GENERAL MEDICAL EXAMINATION AT HEALTH CARE FACILITY: Primary | ICD-10-CM

## 2024-04-16 DIAGNOSIS — I48.0 PAROXYSMAL ATRIAL FIBRILLATION (MULTI): ICD-10-CM

## 2024-04-16 DIAGNOSIS — Z12.31 VISIT FOR SCREENING MAMMOGRAM: ICD-10-CM

## 2024-04-16 DIAGNOSIS — M81.0 AGE-RELATED OSTEOPOROSIS WITHOUT CURRENT PATHOLOGICAL FRACTURE: ICD-10-CM

## 2024-04-16 PROBLEM — D68.9 ACQUIRED COAGULATION DISORDER (MULTI): Status: RESOLVED | Noted: 2023-09-17 | Resolved: 2024-04-16

## 2024-04-16 PROCEDURE — 1160F RVW MEDS BY RX/DR IN RCRD: CPT | Performed by: INTERNAL MEDICINE

## 2024-04-16 PROCEDURE — 99215 OFFICE O/P EST HI 40 MIN: CPT | Performed by: INTERNAL MEDICINE

## 2024-04-16 PROCEDURE — 1157F ADVNC CARE PLAN IN RCRD: CPT | Performed by: INTERNAL MEDICINE

## 2024-04-16 PROCEDURE — 1159F MED LIST DOCD IN RCRD: CPT | Performed by: INTERNAL MEDICINE

## 2024-04-16 PROCEDURE — 99214 OFFICE O/P EST MOD 30 MIN: CPT | Performed by: INTERNAL MEDICINE

## 2024-04-16 PROCEDURE — 1126F AMNT PAIN NOTED NONE PRSNT: CPT | Performed by: INTERNAL MEDICINE

## 2024-04-16 PROCEDURE — G0439 PPPS, SUBSEQ VISIT: HCPCS | Performed by: INTERNAL MEDICINE

## 2024-04-16 PROCEDURE — 1036F TOBACCO NON-USER: CPT | Performed by: INTERNAL MEDICINE

## 2024-04-16 ASSESSMENT — ENCOUNTER SYMPTOMS
WEAKNESS: 0
TREMORS: 0
HEADACHES: 0
COUGH: 0
CHILLS: 0
PHOTOPHOBIA: 0
DYSURIA: 0
FREQUENCY: 0
FEVER: 0
ABDOMINAL PAIN: 0
SHORTNESS OF BREATH: 0
COLOR CHANGE: 0
DIARRHEA: 0
BRUISES/BLEEDS EASILY: 0
PALPITATIONS: 0
ARTHRALGIAS: 0

## 2024-04-16 ASSESSMENT — PATIENT HEALTH QUESTIONNAIRE - PHQ9
2. FEELING DOWN, DEPRESSED OR HOPELESS: NOT AT ALL
2. FEELING DOWN, DEPRESSED OR HOPELESS: NOT AT ALL
SUM OF ALL RESPONSES TO PHQ9 QUESTIONS 1 AND 2: 0
1. LITTLE INTEREST OR PLEASURE IN DOING THINGS: NOT AT ALL
SUM OF ALL RESPONSES TO PHQ9 QUESTIONS 1 AND 2: 0
1. LITTLE INTEREST OR PLEASURE IN DOING THINGS: NOT AT ALL

## 2024-04-16 ASSESSMENT — PAIN SCALES - GENERAL: PAINLEVEL: 0-NO PAIN

## 2024-04-16 NOTE — PROGRESS NOTES
[facility]: [facility]  MEDICARE WELLNESS EXAM      Ruth Veloz is a 73 y.o. female that is presenting today for Annual Exam (More urination at night since hip surgery, prolia shots???).    Assessment/Plan    Diagnoses and all orders for this visit:  Routine general medical examination at health care facility  Comments:  last colonoscopy due 6/24. .  Paroxysmal atrial fibrillation (Multi)  Comments:  Doing well. Recheck labs with digoxin level in summer.  Orders:  -     Digoxin level; Future  -     CBC and Auto Differential; Future  Pure hypercholesterolemia  Comments:  9/23 doing OK.  Orders:  -     Comprehensive Metabolic Panel; Future  -     Lipid Panel; Future  Visit for screening mammogram  -     BI mammo bilateral screening tomosynthesis; Future  Age-related osteoporosis without current pathological fracture  Comments:  Over due for Prolia set up at nurse clinic, [facility]. Dexa 3/23 Froearm T-2.6 next due 3/25.  Vitamin D deficiency  -     Vitamin D 25-Hydroxy,Total (for eval of Vitamin D levels); Future    ADVANCED CARE PLANNING  Advanced Care Planning was discussed with patient:  The patient has an active advanced care plan on file. The patient has an active surrogate decision-maker on file.  Encouraged the patient to confirm that Living Will and Healthcare Power of  (HCPoA) are accurate and up to date.  Encouraged the patient to confirm that our office be provided a copy of any documentation in the event that anything changes.    ACTIVITIES OF DAILY LIVING  Basic ADLs:  Bathing: Independent, Dressing: Independent, Toileting: Independent, Transferring: Independent, Continence: Independent, Feeding: Independent.    Instrumental ADLs:  Ability to use phone: Independent, Shopping: Independent, Cooking: Independent, House-keeping: Independent, Laundry: Independent, Transportation: Independent, Medication Management: Independent, Finance Management:  Independent.    Subjective   DR.Doyle MCKEON, Dr.Convery HEATON CORINNE. Wellness on no narcotics,Advanced Directive  forms on chart, vision, hearing doing well,  Whisper test 6 ft nl, Screenig reviewed, updated screening sheet. E/M code osteoprosis, over due Prolia, PAF now on dig, levels to be checked.      Review of Systems   Constitutional:  Negative for chills and fever.   HENT:  Negative for congestion.    Eyes:  Negative for photophobia and visual disturbance.   Respiratory:  Negative for cough and shortness of breath.    Cardiovascular:  Negative for chest pain and palpitations.   Gastrointestinal:  Negative for abdominal pain and diarrhea.   Endocrine: Negative for cold intolerance and heat intolerance.   Genitourinary:  Negative for dysuria and frequency.   Musculoskeletal:  Negative for arthralgias.   Skin:  Negative for color change.   Neurological:  Negative for tremors, weakness and headaches.   Hematological:  Does not bruise/bleed easily.     Objective   Vitals:    04/16/24 1049   BP: 140/54   Pulse: 51   Temp: 36.4 °C (97.6 °F)   SpO2: 99%      Body mass index is 23.43 kg/m².  Physical Exam  Constitutional:       General: She is not in acute distress.     Appearance: She is not toxic-appearing.   HENT:      Head: Normocephalic and atraumatic.      Right Ear: Tympanic membrane and ear canal normal.      Left Ear: Tympanic membrane and ear canal normal.      Nose: Nose normal.      Mouth/Throat:      Pharynx: Oropharynx is clear.   Eyes:      Extraocular Movements: Extraocular movements intact.      Pupils: Pupils are equal, round, and reactive to light.   Cardiovascular:      Rate and Rhythm: Normal rate and regular rhythm.      Heart sounds: Normal heart sounds. No murmur heard.  Pulmonary:      Breath sounds: Normal breath sounds.   Abdominal:      General: Bowel sounds are normal. There is no distension.      Palpations: Abdomen is soft. There is no mass.      Tenderness: There is no abdominal tenderness.  "  Musculoskeletal:         General: Tenderness (left hip FROM) present. No swelling.      Right lower leg: No edema.      Left lower leg: Edema (trace -1 + LTHA 1/24 no cady tenderness) present.   Skin:     General: Skin is warm and dry.   Neurological:      General: No focal deficit present.      Mental Status: She is oriented to person, place, and time.      Sensory: No sensory deficit.      Motor: No weakness.      Deep Tendon Reflexes: Reflexes normal.       Diagnostic Results   Lab Results   Component Value Date    GLUCOSE 76 04/10/2024    CALCIUM 10.1 04/10/2024     04/10/2024    K 4.3 04/10/2024    CO2 26 04/10/2024     04/10/2024    BUN 18 04/10/2024    CREATININE 0.80 04/10/2024     Lab Results   Component Value Date    ALT 10 04/10/2024    AST 19 04/10/2024    ALKPHOS 74 04/10/2024    BILITOT 0.5 04/10/2024     Lab Results   Component Value Date    WBC 5.8 04/10/2024    HGB 12.9 04/10/2024    HCT 41.7 04/10/2024    MCV 94 04/10/2024     04/10/2024     Lab Results   Component Value Date    CHOL 206 (H) 09/08/2023    CHOL 212 (H) 09/01/2022    CHOL 215 (H) 08/23/2021     Lab Results   Component Value Date    HDL 68 09/08/2023    HDL 76 09/01/2022    HDL 66 08/23/2021     Lab Results   Component Value Date    LDLCALC 112 09/08/2023    LDLCALC 116 09/01/2022    LDLCALC 131 (H) 08/23/2021     Lab Results   Component Value Date    TRIG 132 09/08/2023    TRIG 98 09/01/2022    TRIG 91 08/23/2021     No components found for: \"CHOLHDL\"  No results found for: \"HGBA1C\"  Other labs not included in the list above reviewed either before or during this encounter.    History   Past Medical History:   Diagnosis Date    A-fib (Multi)     GERD (gastroesophageal reflux disease)     Hip pain, acute, left     Osteoporosis      3/23 DExa Katty T-1.3, hip neck T-1.7, forearm T-2.6 Prolia over due 4/24    Personal history of diseases of the blood and blood-forming organs and certain disorders involving " the immune mechanism     History of autoimmune disorder    Personal history of other diseases of the digestive system     History of gastroesophageal reflux (GERD)     Past Surgical History:   Procedure Laterality Date    HIP SURGERY Left 01/10/2024    total    OTHER SURGICAL HISTORY  12/13/2021    Balloon sinuplasty    SMALL INTESTINE SURGERY  2001    twisted bowel repair     Family History   Problem Relation Name Age of Onset    Other (leaky valve) Mother      Stroke Mother      Hypertension Mother      Kidney failure Father      Cushing syndrome Sister      No Known Problems Brother      No Known Problems Daughter       Social History     Socioeconomic History    Marital status:      Spouse name: Not on file    Number of children: Not on file    Years of education: Not on file    Highest education level: Not on file   Occupational History    Not on file   Tobacco Use    Smoking status: Never     Passive exposure: Never    Smokeless tobacco: Never   Vaping Use    Vaping status: Never Used   Substance and Sexual Activity    Alcohol use: Not Currently    Drug use: Never    Sexual activity: Defer   Other Topics Concern    Not on file   Social History Narrative    Not on file     Social Determinants of Health     Financial Resource Strain: Low Risk  (1/16/2024)    Overall Financial Resource Strain (CARDIA)     Difficulty of Paying Living Expenses: Not hard at all   Food Insecurity: Not on file   Transportation Needs: No Transportation Needs (2/7/2024)    OASIS : Transportation     Lack of Transportation (Medical): No     Lack of Transportation (Non-Medical): No     Patient Unable or Declines to Respond: No   Physical Activity: Not on file   Stress: Not on file   Social Connections: Feeling Socially Integrated (2/7/2024)    OASIS : Social Isolation     Frequency of experiencing loneliness or isolation: Rarely   Intimate Partner Violence: Not on file   Housing Stability: Low Risk  (1/16/2024)    Housing  Stability Vital Sign     Unable to Pay for Housing in the Last Year: No     Number of Places Lived in the Last Year: 1     Unstable Housing in the Last Year: No     Allergies   Allergen Reactions    Amoxicillin Hives    Amoxicillin-Pot Clavulanate Hives    Flunisolide Hives    Penicillin Hives    Sulfa (Sulfonamide Antibiotics) Unknown     Current Outpatient Medications on File Prior to Visit   Medication Sig Dispense Refill    digoxin (Lanoxin) 125 MCG tablet Take 1 tablet (125 mcg) by mouth once daily. 90 tablet 3    Eliquis 5 mg tablet TAKE 1 TABLET BY MOUTH TWICE  DAILY AS DIRECTED 180 tablet 3    famotidine (Pepcid) 20 mg tablet Take 1 tablet (20 mg) by mouth early in the morning..      fluticasone (Flonase) 50 mcg/actuation nasal spray Administer 1-2 sprays into each nostril once daily.      metoprolol tartrate (Lopressor) 25 mg tablet Take 1 tablet (25 mg) by mouth 2 times a day as needed.      sotalol (Betapace) 80 mg tablet TAKE ONE-HALF TABLET BY MOUTH IN THE MORNING AND 1 TABLET BY  MOUTH AT NIGHT 135 tablet 3     No current facility-administered medications on file prior to visit.     Immunization History   Administered Date(s) Administered    Flu vaccine, quadrivalent, high-dose, preservative free, age 65y+ (FLUZONE) 10/05/2020, 12/07/2021, 10/25/2022, 09/12/2023    Influenza, High Dose Seasonal, Preservative Free 09/25/2017, 10/01/2018, 10/07/2019, 10/19/2020    Influenza, injectable, quadrivalent 11/01/2016    Influenza, seasonal, injectable 10/25/2010, 09/27/2011, 10/23/2012, 09/18/2013, 09/26/2014, 10/28/2015, 10/03/2016    Novel influenza-H1N1-09, preservative-free 12/06/2009    Pfizer COVID-19 vaccine, Fall 2023, 12 years and older, (30mcg/0.3mL) 12/01/2023    Pfizer Purple Cap SARS-CoV-2 03/08/2021, 03/24/2021, 10/01/2021    Pneumococcal conjugate vaccine, 13-valent (PREVNAR 13) 09/21/2015    Pneumococcal polysaccharide vaccine, 23-valent, age 2 years and older (PNEUMOVAX 23) 09/26/2014,  08/17/2021    RSV, 60 Years And Older (AREXVY) 12/16/2023    Tdap vaccine, age 7 year and older (BOOSTRIX, ADACEL) 06/01/2007, 07/07/2017    Zoster vaccine, recombinant, adult (SHINGRIX) 11/07/2019, 01/06/2020    Zoster, live 05/20/2010     Patient's medical history was reviewed and updated either before or during this encounter.     German Espinoza MD

## 2024-04-16 NOTE — PATIENT INSTRUCTIONS
September follow up, labs August    Diagnoses and all orders for this visit:  Routine general medical examination at health care facility  Comments:  last colonoscopy due 6/24. .  Paroxysmal atrial fibrillation (Multi)  Comments:  Doing well. Recheck labs with digoxin level in summer.  Orders:  -     Digoxin level; Future  -     CBC and Auto Differential; Future  Pure hypercholesterolemia  Comments:  9/23 doing OK.  Orders:  -     Comprehensive Metabolic Panel; Future  -     Lipid Panel; Future  Visit for screening mammogram  -     BI mammo bilateral screening tomosynthesis; Future  Age-related osteoporosis without current pathological fracture  Comments:  Over due for Prolia set up at nurse clinic, [facility]. Dexa 3/23 Froearm T-2.6 next due 3/25.  Vitamin D deficiency  -     Vitamin D 25-Hydroxy,Total (for eval of Vitamin D levels); Future

## 2024-04-25 ENCOUNTER — APPOINTMENT (OUTPATIENT)
Dept: ORTHOPEDIC SURGERY | Facility: CLINIC | Age: 74
End: 2024-04-25
Payer: MEDICARE

## 2024-04-26 ENCOUNTER — OFFICE VISIT (OUTPATIENT)
Dept: ORTHOPEDIC SURGERY | Facility: CLINIC | Age: 74
End: 2024-04-26
Payer: MEDICARE

## 2024-04-26 ENCOUNTER — HOSPITAL ENCOUNTER (OUTPATIENT)
Dept: RADIOLOGY | Facility: CLINIC | Age: 74
Discharge: HOME | End: 2024-04-26
Payer: MEDICARE

## 2024-04-26 VITALS — WEIGHT: 121.91 LBS | BODY MASS INDEX: 23.42 KG/M2

## 2024-04-26 DIAGNOSIS — Z96.649 STATUS POST HIP REPLACEMENT: ICD-10-CM

## 2024-04-26 DIAGNOSIS — Z96.642 S/P HIP REPLACEMENT, LEFT: ICD-10-CM

## 2024-04-26 DIAGNOSIS — M25.552 LEFT HIP PAIN: Primary | ICD-10-CM

## 2024-04-26 PROCEDURE — 1157F ADVNC CARE PLAN IN RCRD: CPT | Performed by: ORTHOPAEDIC SURGERY

## 2024-04-26 PROCEDURE — 99213 OFFICE O/P EST LOW 20 MIN: CPT | Performed by: ORTHOPAEDIC SURGERY

## 2024-04-26 PROCEDURE — 1159F MED LIST DOCD IN RCRD: CPT | Performed by: ORTHOPAEDIC SURGERY

## 2024-04-26 PROCEDURE — 73502 X-RAY EXAM HIP UNI 2-3 VIEWS: CPT | Mod: LEFT SIDE | Performed by: ORTHOPAEDIC SURGERY

## 2024-04-26 PROCEDURE — 73502 X-RAY EXAM HIP UNI 2-3 VIEWS: CPT | Mod: LT

## 2024-04-26 PROCEDURE — 1160F RVW MEDS BY RX/DR IN RCRD: CPT | Performed by: ORTHOPAEDIC SURGERY

## 2024-04-26 PROCEDURE — 1036F TOBACCO NON-USER: CPT | Performed by: ORTHOPAEDIC SURGERY

## 2024-04-26 ASSESSMENT — LIFESTYLE VARIABLES: TOTAL SCORE: 0

## 2024-04-26 ASSESSMENT — PAIN DESCRIPTION - DESCRIPTORS: DESCRIPTORS: ACHING

## 2024-04-26 ASSESSMENT — ENCOUNTER SYMPTOMS
OCCASIONAL FEELINGS OF UNSTEADINESS: 0
LOSS OF SENSATION IN FEET: 0
DEPRESSION: 0

## 2024-04-26 ASSESSMENT — PAIN SCALES - GENERAL: PAINLEVEL_OUTOF10: 5 - MODERATE PAIN

## 2024-04-26 ASSESSMENT — PATIENT HEALTH QUESTIONNAIRE - PHQ9
1. LITTLE INTEREST OR PLEASURE IN DOING THINGS: NOT AT ALL
SUM OF ALL RESPONSES TO PHQ9 QUESTIONS 1 AND 2: 0
2. FEELING DOWN, DEPRESSED OR HOPELESS: NOT AT ALL

## 2024-04-26 ASSESSMENT — PAIN - FUNCTIONAL ASSESSMENT: PAIN_FUNCTIONAL_ASSESSMENT: 0-10

## 2024-04-26 NOTE — PROGRESS NOTES
Subjective      Past Surgical History:   Procedure Laterality Date    HIP SURGERY Left 01/10/2024    total    OTHER SURGICAL HISTORY  12/13/2021    Balloon sinuplasty    SMALL INTESTINE SURGERY  2001    twisted bowel repair          Patient History      This patient is s/p left hip replacement  done by myself on 1/10/2024 and subsequent closed reduction of left hip prosthesis on 1-.  She presents today in the presence of her  and states that her left pain is now markedly improved from preoperatively and is a 1/10 at most.  She  is slowly resuming  her normal activities of daily living. She has finished working with home physical therapy and is doing exercises on her own.  She is seen today walking independently out support and with a normal gait. she denies chest pain, shortness of breath.     There were no vitals taken for this visit.     ROS  CARDIOLOGY:   Negative for chest pain, shortness of breath.   RESPIRATORY:   Negative for chest pain, shortness of breath.   MUSCULOSKELETAL:   See HPI for details.   NEUROLOGY:   Negative for tingling, numbness, weakness.      Physical exam: Left hip: incision is clean dry and well healing. No erythema, purulent drainage or foul smell noted. Left lower extremity is in good position. Nontender in the left calf. Neurovascular is intact. The patient is seen walking independently without support.     ECG 12 lead    Result Date: 1/29/2024  Atrial fibrillation with rapid ventricular response with premature ventricular or aberrantly conducted complexes Minimal voltage criteria for LVH, may be normal variant Nonspecific ST and T wave abnormality Abnormal ECG No previous ECGs available    ECG 12 lead    Result Date: 1/24/2024  Normal sinus rhythm Minimal voltage criteria for LVH, may be normal variant Borderline ECG Confirmed by Dell Pena ([id number]) on 1/24/2024 6:23:05 AM    ECG 12 Lead    Result Date: 1/24/2024  Normal sinus rhythm Nonspecific T wave abnormality  Abnormal ECG Confirmed by Dell Pena ([id number]) on 1/24/2024 6:21:25 AM    ECG 12 lead    Result Date: 1/15/2024  Atrial flutter with variable AV block with premature ventricular or aberrantly conducted complexes Minimal voltage criteria for LVH, may be normal variant Nonspecific T wave abnormality Abnormal ECG No previous ECGs available Confirmed by German Oneal ([id number]) on 1/15/2024 11:23:52 AM    FL less than 1 hour    Result Date: 1/13/2024  These images are not reportable by radiology and will not be interpreted by  Radiologists.    XR hip left with pelvis  x-ray done and read in the office today is reviewed with the patient and her  in the office today and shows that there is a left total hip replacement in overall satisfactory position and alignment.    Result Date: 1/11/2024  Interpreted By:  Benji Cat, STUDY: XR HIP LEFT WITH PELVIS WHEN PERFORMED 1 VIEW; ;  1/11/2024 6:15 pm   INDICATION: Signs/Symptoms:Reevaluate.   COMPARISON: 01/10/2024   ACCESSION NUMBER(S): [id number]   ORDERING CLINICIAN: SAIDA NORMAN   FINDINGS: There is again total left hip arthroplasty. The prosthetic left femoral head appears to be subluxated laterally to the prosthetic acetabular cup; allowing for differences in technique the subluxation appears to have worsened since the prior exam. No acute fracture is seen. There is no lucency around the hardware to suggest loosening. Postsurgical gas again overlies the soft tissues.       Persistent worsening lateral subluxation of the prosthetic left femoral head in relation to the prosthetic acetabular cup.   Signed by: Benji Cat 1/11/2024 6:24 PM Dictation workstation:   [id number]    XR hip left with pelvis when performed 1 view    Result Date: 1/10/2024  Interpreted By:  Ramon Gamble, STUDY: XR HIP LEFT WITH PELVIS WHEN PERFORMED 1 VIEW; ;  1/10/2024 3:55 pm   INDICATION: Signs/Symptoms:Post op hip.   COMPARISON: 11/09/2023   ACCESSION NUMBER(S):  [id number]   ORDERING CLINICIAN: SADE ALTMAN   FINDINGS: Single limited view the left hip. Interval postsurgical changes of left total hip arthroplasty. There is soft tissue swelling and gas overlying the left hip.   On this single limited radiograph there appears to be slight lateral subluxation of the femoral head with respect to the acetabular cup. Correlation and possible repeat radiographs is recommended.       See findings.     MACRO: None   Signed by: Ramon Gamble 1/10/2024 4:13 PM Dictation workstation:   [id number]      Ruth was seen today for follow-up.  Diagnoses and all orders for this visit:  Left hip pain (Primary)  S/P hip replacement, left  Options are discussed with the patient in detail.  The patient is instructed regarding total hip position and infection precautions, activity modification and risk for further injury with falling or trauma and to use a cane for support while walking, ice, provider directed at home gentle strengthening and ROM exercises, and the appropriate use of Tylenol as needed for pain with its potential adverse reactions and side effects. The patient understands. Return as needed. Please note that this report has been produced using speech recognition software.  It may contain errors related to grammar, punctuation or spelling.  Electronically signed, but not reviewed.

## 2024-04-26 NOTE — PATIENT INSTRUCTIONS
Continue to rest, and ice your hip.  Continue your pain regimen.   Continue Physical therapy on your own at home.  Remember to call our office for antibiotics before dental work.   Remember not to internally rotate your hip to avoid dislocation.   Use a cane or walker for support.   Do not kneel, twist, or squat and avoid heavy lifting.     Return as needed

## 2024-05-02 ENCOUNTER — TELEPHONE (OUTPATIENT)
Dept: PRIMARY CARE | Facility: CLINIC | Age: 74
End: 2024-05-02
Payer: MEDICARE

## 2024-05-02 NOTE — TELEPHONE ENCOUNTER
Buy and bill in office fully covered through patient's insurance. Called patient and set up appointment. Will need prolia ordered for 5/16/24. Thanks!

## 2024-05-13 ENCOUNTER — HOSPITAL ENCOUNTER (OUTPATIENT)
Dept: RADIOLOGY | Facility: HOSPITAL | Age: 74
Discharge: HOME | End: 2024-05-13
Payer: MEDICARE

## 2024-05-13 VITALS — BODY MASS INDEX: 23.03 KG/M2 | WEIGHT: 122 LBS | HEIGHT: 61 IN

## 2024-05-13 DIAGNOSIS — Z12.31 VISIT FOR SCREENING MAMMOGRAM: ICD-10-CM

## 2024-05-13 PROCEDURE — 77067 SCR MAMMO BI INCL CAD: CPT | Performed by: RADIOLOGY

## 2024-05-13 PROCEDURE — 77067 SCR MAMMO BI INCL CAD: CPT

## 2024-05-13 PROCEDURE — 77063 BREAST TOMOSYNTHESIS BI: CPT | Performed by: RADIOLOGY

## 2024-05-16 ENCOUNTER — CLINICAL SUPPORT (OUTPATIENT)
Dept: PRIMARY CARE | Facility: CLINIC | Age: 74
End: 2024-05-16
Payer: MEDICARE

## 2024-05-16 ENCOUNTER — TELEPHONE (OUTPATIENT)
Dept: ORTHOPEDIC SURGERY | Facility: CLINIC | Age: 74
End: 2024-05-16

## 2024-05-16 DIAGNOSIS — M16.12 PRIMARY OSTEOARTHRITIS OF LEFT HIP: ICD-10-CM

## 2024-05-16 PROCEDURE — 2500000004 HC RX 250 GENERAL PHARMACY W/ HCPCS (ALT 636 FOR OP/ED): Mod: JZ | Performed by: INTERNAL MEDICINE

## 2024-05-16 PROCEDURE — 96372 THER/PROPH/DIAG INJ SC/IM: CPT | Performed by: INTERNAL MEDICINE

## 2024-05-16 RX ADMIN — DENOSUMAB 60 MG: 60 INJECTION SUBCUTANEOUS at 09:08

## 2024-05-20 NOTE — TELEPHONE ENCOUNTER
Patient had total hip surgery [id number] and would like to know when she can bend at 90 degrees   
SPOKE TO PATIENT AND EXPLAINED 90 DEGREE.  SHE AGREES AND THANKS US   
The patient can bend her hip to 90 degrees but needs to avoid any rotation of the hip that was operated on.  In addition she needs to call me and speak with me directly with any further questions.  
- liver lesion: Images reviewed with radiology (Dr. Caballero): prior CT was double phase; significant claustrophobia; consider triple phase CT as inpatient as he is nonocompliant and has not seen a hepatologist in the past.  - ascites due to cirrhosis, small: diagnostic paracentesis if enough fluid. Will likely resolve if he manages to stay abstinent.  - should follow-up with us in the liver center within 2 weeks after discharge.

## 2024-08-20 NOTE — PROGRESS NOTES
Subjective      Chief Complaint   Patient presents with    6 month follow up          She has a history of paroxysmal atrial fibrillation.  In 2019 she had a cardiac calcium score of 0.  Echocardiogram in past showed left atrium is mildly enlarged.  She is active and will walk 1-3 miles a day 3 times a week.  Since on the dig the heart rate is much better and does not seem to race as much.  Is not getting dizzy or lightheaded  She is not complaining of chest discomfort.  No complaints of PND or orthopnea.  The legs are not swelling on her.             Review of Systems   Constitutional: Negative. Negative for chills and fever.   HENT: Negative.     Eyes: Negative.    Respiratory: Negative.  Negative for cough.    Endocrine: Negative.    Skin: Negative.    Musculoskeletal: Negative.  Negative for falls.   Gastrointestinal: Negative.    Genitourinary: Negative.    Neurological: Negative.    All other systems reviewed and are negative.       Past Surgical History:   Procedure Laterality Date    HIP SURGERY Left 01/10/2024    total    OTHER SURGICAL HISTORY  12/13/2021    Balloon sinuplasty    SMALL INTESTINE SURGERY  2001    twisted bowel repair        Active Ambulatory Problems     Diagnosis Date Noted    Acute sinusitis 09/17/2023    Allergic rhinitis 09/17/2023    Aortic valve disease 09/17/2023    Aortic valve regurgitation 09/17/2023    Bursitis of right shoulder 09/17/2023    Cervicalgia 09/17/2023    Gastroesophageal reflux disease 09/17/2023    Cervix prolapsed into vagina 09/17/2023    Menopausal symptom 09/17/2023    Polyp of nasal cavity 09/17/2023    Paroxysmal atrial fibrillation (Multi) 09/17/2023    Pure hypercholesterolemia 09/17/2023    Spinal stenosis of lumbar region 09/17/2023    Vitamin D deficiency 09/17/2023    Left hip pain 11/09/2023    Osteoarthritis of left hip 11/09/2023    Preop cardiovascular exam 12/21/2023    Mitral regurgitation 01/10/2024    Dislocation of hip prosthesis (CMS-[facility])  11/09/2023    Dislocation of hip joint prosthesis (CMS-[facility]) 11/09/2023    Cellulitis of left hip 02/09/2024    History of immune disorder 02/09/2024    History of repair of hip joint 02/09/2024    Osteoporosis 03/10/2023    Post-traumatic osteoarthritis, other specified site 12/27/2023    Sprain of hip 02/09/2024    S/P hip replacement, left 02/12/2024     Resolved Ambulatory Problems     Diagnosis Date Noted    Acquired coagulation disorder (Multi) 09/17/2023     Past Medical History:   Diagnosis Date    A-fib (Multi)     GERD (gastroesophageal reflux disease)     Hip pain, acute, left     Personal history of diseases of the blood and blood-forming organs and certain disorders involving the immune mechanism     Personal history of other diseases of the digestive system         Visit Vitals  /85   Pulse 54   Wt 55.8 kg (123 lb)   SpO2 97%   BMI 23.24 kg/m²   OB Status Postmenopausal   Smoking Status Never   BSA 1.55 m²        Objective     Constitutional:       Appearance: Healthy appearance.   Eyes:      Pupils: Pupils are equal, round, and reactive to light.   Neck:      Vascular: JVD normal.   Pulmonary:      Breath sounds: Normal breath sounds.   Cardiovascular:      PMI at left midclavicular line. Normal rate. Regular rhythm. Normal S1. Normal S2.       Murmurs: There is no murmur.      No gallop.  No click. No rub.   Pulses:     Intact distal pulses.   Edema:     Peripheral edema absent.   Abdominal:      Palpations: Abdomen is soft.      Tenderness: There is no abdominal tenderness.   Musculoskeletal:      Extremities: No clubbing present.Skin:     General: Skin is warm and dry.   Neurological:      General: No focal deficit present.            Lab Review:         Lab Results   Component Value Date    CHOL 206 (H) 09/08/2023    CHOL 212 (H) 09/01/2022    CHOL 215 (H) 08/23/2021     Lab Results   Component Value Date    HDL 68 09/08/2023    HDL 76 09/01/2022    HDL 66 08/23/2021     Lab Results  "  Component Value Date    LDLCALC 112 09/08/2023    LDLCALC 116 09/01/2022    LDLCALC 131 (H) 08/23/2021     Lab Results   Component Value Date    TRIG 132 09/08/2023    TRIG 98 09/01/2022    TRIG 91 08/23/2021     No components found for: \"CHOLHDL\"     Assessment/Plan     Paroxysmal atrial fibrillation (Multi)  is doing well and is active.  No angina and no chf.  The afib is rare.  Will see in 6 month    Pure hypercholesterolemia  Is controlled     "

## 2024-08-21 ENCOUNTER — OFFICE VISIT (OUTPATIENT)
Dept: CARDIOLOGY | Facility: CLINIC | Age: 74
End: 2024-08-21
Payer: MEDICARE

## 2024-08-21 VITALS
HEART RATE: 54 BPM | DIASTOLIC BLOOD PRESSURE: 85 MMHG | WEIGHT: 123 LBS | SYSTOLIC BLOOD PRESSURE: 124 MMHG | OXYGEN SATURATION: 97 % | BODY MASS INDEX: 23.24 KG/M2

## 2024-08-21 DIAGNOSIS — E78.00 PURE HYPERCHOLESTEROLEMIA: ICD-10-CM

## 2024-08-21 DIAGNOSIS — I48.0 PAROXYSMAL ATRIAL FIBRILLATION (MULTI): Primary | ICD-10-CM

## 2024-08-21 PROCEDURE — 1159F MED LIST DOCD IN RCRD: CPT | Performed by: INTERNAL MEDICINE

## 2024-08-21 PROCEDURE — 99212 OFFICE O/P EST SF 10 MIN: CPT | Performed by: INTERNAL MEDICINE

## 2024-08-21 PROCEDURE — 1157F ADVNC CARE PLAN IN RCRD: CPT | Performed by: INTERNAL MEDICINE

## 2024-08-21 PROCEDURE — 1126F AMNT PAIN NOTED NONE PRSNT: CPT | Performed by: INTERNAL MEDICINE

## 2024-08-21 PROCEDURE — 1036F TOBACCO NON-USER: CPT | Performed by: INTERNAL MEDICINE

## 2024-08-21 ASSESSMENT — ENCOUNTER SYMPTOMS
FALLS: 0
OCCASIONAL FEELINGS OF UNSTEADINESS: 0
RESPIRATORY NEGATIVE: 1
GASTROINTESTINAL NEGATIVE: 1
NEUROLOGICAL NEGATIVE: 1
CHILLS: 0
DEPRESSION: 0
COUGH: 0
ENDOCRINE NEGATIVE: 1
LOSS OF SENSATION IN FEET: 0
FEVER: 0
CONSTITUTIONAL NEGATIVE: 1
EYES NEGATIVE: 1
MUSCULOSKELETAL NEGATIVE: 1

## 2024-08-21 ASSESSMENT — PATIENT HEALTH QUESTIONNAIRE - PHQ9
2. FEELING DOWN, DEPRESSED OR HOPELESS: NOT AT ALL
1. LITTLE INTEREST OR PLEASURE IN DOING THINGS: NOT AT ALL
SUM OF ALL RESPONSES TO PHQ9 QUESTIONS 1 AND 2: 0

## 2024-08-21 ASSESSMENT — PAIN SCALES - GENERAL: PAINLEVEL: 0-NO PAIN

## 2024-09-23 ENCOUNTER — APPOINTMENT (OUTPATIENT)
Dept: PRIMARY CARE | Facility: CLINIC | Age: 74
End: 2024-09-23
Payer: MEDICARE

## 2024-09-27 ENCOUNTER — OFFICE VISIT (OUTPATIENT)
Dept: ORTHOPEDIC SURGERY | Facility: CLINIC | Age: 74
End: 2024-09-27
Payer: MEDICARE

## 2024-09-27 ENCOUNTER — HOSPITAL ENCOUNTER (OUTPATIENT)
Dept: RADIOLOGY | Facility: CLINIC | Age: 74
Discharge: HOME | End: 2024-09-27
Payer: MEDICARE

## 2024-09-27 VITALS — HEIGHT: 61 IN | BODY MASS INDEX: 23.22 KG/M2 | WEIGHT: 123 LBS

## 2024-09-27 DIAGNOSIS — S93.402A MODERATE LEFT ANKLE SPRAIN, INITIAL ENCOUNTER: ICD-10-CM

## 2024-09-27 DIAGNOSIS — M25.552 LEFT HIP PAIN: ICD-10-CM

## 2024-09-27 DIAGNOSIS — M25.572 LEFT ANKLE PAIN, UNSPECIFIED CHRONICITY: Primary | ICD-10-CM

## 2024-09-27 DIAGNOSIS — Z96.642 S/P HIP REPLACEMENT, LEFT: ICD-10-CM

## 2024-09-27 DIAGNOSIS — R52 PAIN: ICD-10-CM

## 2024-09-27 PROCEDURE — 1159F MED LIST DOCD IN RCRD: CPT | Performed by: ORTHOPAEDIC SURGERY

## 2024-09-27 PROCEDURE — 3008F BODY MASS INDEX DOCD: CPT | Performed by: ORTHOPAEDIC SURGERY

## 2024-09-27 PROCEDURE — 1036F TOBACCO NON-USER: CPT | Performed by: ORTHOPAEDIC SURGERY

## 2024-09-27 PROCEDURE — 73610 X-RAY EXAM OF ANKLE: CPT | Mod: LT

## 2024-09-27 PROCEDURE — 1157F ADVNC CARE PLAN IN RCRD: CPT | Performed by: ORTHOPAEDIC SURGERY

## 2024-09-27 PROCEDURE — 99213 OFFICE O/P EST LOW 20 MIN: CPT | Performed by: ORTHOPAEDIC SURGERY

## 2024-09-27 ASSESSMENT — LIFESTYLE VARIABLES
HOW OFTEN DURING THE LAST YEAR HAVE YOU BEEN UNABLE TO REMEMBER WHAT HAPPENED THE NIGHT BEFORE BECAUSE YOU HAD BEEN DRINKING: NEVER
SKIP TO QUESTIONS 9-10: 1
HAVE YOU OR SOMEONE ELSE BEEN INJURED AS A RESULT OF YOUR DRINKING: NO
HAS A RELATIVE, FRIEND, DOCTOR, OR ANOTHER HEALTH PROFESSIONAL EXPRESSED CONCERN ABOUT YOUR DRINKING OR SUGGESTED YOU CUT DOWN: NO
HOW OFTEN DO YOU HAVE SIX OR MORE DRINKS ON ONE OCCASION: NEVER
HOW OFTEN DURING THE LAST YEAR HAVE YOU HAD A FEELING OF GUILT OR REMORSE AFTER DRINKING: NEVER
HOW MANY STANDARD DRINKS CONTAINING ALCOHOL DO YOU HAVE ON A TYPICAL DAY: PATIENT DOES NOT DRINK
AUDIT TOTAL SCORE: 0
HOW OFTEN DURING THE LAST YEAR HAVE YOU FOUND THAT YOU WERE NOT ABLE TO STOP DRINKING ONCE YOU HAD STARTED: NEVER
HOW OFTEN DURING THE LAST YEAR HAVE YOU FAILED TO DO WHAT WAS NORMALLY EXPECTED FROM YOU BECAUSE OF DRINKING: NEVER
HOW OFTEN DO YOU HAVE A DRINK CONTAINING ALCOHOL: NEVER
AUDIT-C TOTAL SCORE: 0

## 2024-09-27 ASSESSMENT — COLUMBIA-SUICIDE SEVERITY RATING SCALE - C-SSRS
1. IN THE PAST MONTH, HAVE YOU WISHED YOU WERE DEAD OR WISHED YOU COULD GO TO SLEEP AND NOT WAKE UP?: NO
6. HAVE YOU EVER DONE ANYTHING, STARTED TO DO ANYTHING, OR PREPARED TO DO ANYTHING TO END YOUR LIFE?: NO
2. HAVE YOU ACTUALLY HAD ANY THOUGHTS OF KILLING YOURSELF?: NO

## 2024-09-27 ASSESSMENT — ENCOUNTER SYMPTOMS
LOSS OF SENSATION IN FEET: 0
OCCASIONAL FEELINGS OF UNSTEADINESS: 0
DEPRESSION: 0

## 2024-09-27 ASSESSMENT — PAIN SCALES - GENERAL
PAINLEVEL_OUTOF10: 0 - NO PAIN
PAINLEVEL: 0-NO PAIN

## 2024-09-27 ASSESSMENT — PAIN - FUNCTIONAL ASSESSMENT: PAIN_FUNCTIONAL_ASSESSMENT: 0-10

## 2024-09-27 NOTE — PATIENT INSTRUCTIONS
Continue to rest, and ice your hip.    Remember to call our office for antibiotics before dental work.   Remember not to internally rotate your hip to avoid dislocation.     Do not kneel, twist, or squat and avoid heavy lifting.   Return in 6 months or sooner as needed.

## 2024-09-27 NOTE — PROGRESS NOTES
Subjective      Past Surgical History:   Procedure Laterality Date    HIP SURGERY Left 01/10/2024    total    OTHER SURGICAL HISTORY  12/13/2021    Balloon sinuplasty    SMALL INTESTINE SURGERY  2001    twisted bowel repair          Patient History      This patient is s/p left hip replacement  done by myself on 1/10/2024 and subsequent closed reduction of left hip prosthesis on 1-.  She presents today and states that her left pain is now markedly improved from preoperatively and is a 1/10 at most.  She  is slowly resumed her normal activities of daily living. She has finished working with home physical therapy and is doing exercises on her own.  She is seen today walking independently out support and with a normal gait. She notices tightness at the left ankle, but denies pain.  she denies chest pain, shortness of breath.     There were no vitals taken for this visit.     ROS  CARDIOLOGY:   Negative for chest pain, shortness of breath.   RESPIRATORY:   Negative for chest pain, shortness of breath.   MUSCULOSKELETAL:   See HPI for details.   NEUROLOGY:   Negative for tingling, numbness, weakness.      Physical exam: Left hip: incision is clean dry and well healed. No erythema, purulent drainage or foul smell noted. Left lower extremity is in good position. Nontender in the left calf. Neurovascular is intact. No pain with gentle flexion and extension at the left ankle. No swelling at the left ankle. There is full active and passive painless range of motionThe patient is seen walking independently without support.     ECG 12 lead    Result Date: 1/29/2024  Atrial fibrillation with rapid ventricular response with premature ventricular or aberrantly conducted complexes Minimal voltage criteria for LVH, may be normal variant Nonspecific ST and T wave abnormality Abnormal ECG No previous ECGs available    ECG 12 lead    Result Date: 1/24/2024  Normal sinus rhythm Minimal voltage criteria for LVH, may be normal  variant Borderline ECG Confirmed by Dell Pena ([id number]) on 1/24/2024 6:23:05 AM    ECG 12 Lead    Result Date: 1/24/2024  Normal sinus rhythm Nonspecific T wave abnormality Abnormal ECG Confirmed by Dell Pena ([id number]) on 1/24/2024 6:21:25 AM    ECG 12 lead    Result Date: 1/15/2024  Atrial flutter with variable AV block with premature ventricular or aberrantly conducted complexes Minimal voltage criteria for LVH, may be normal variant Nonspecific T wave abnormality Abnormal ECG No previous ECGs available Confirmed by German Oneal ([id number]) on 1/15/2024 11:23:52 AM    FL less than 1 hour    Result Date: 1/13/2024  These images are not reportable by radiology and will not be interpreted by  Radiologists.    XR hip left with pelvis  x-ray done and read in the office today is reviewed with the patient and her  in the office today and shows that there is a left total hip replacement in overall satisfactory position and alignment.    Result Date: 1/11/2024  Interpreted By:  Benji Cat, STUDY: XR HIP LEFT WITH PELVIS WHEN PERFORMED 1 VIEW; ;  1/11/2024 6:15 pm   INDICATION: Signs/Symptoms:Reevaluate.   COMPARISON: 01/10/2024   ACCESSION NUMBER(S): [id number]   ORDERING CLINICIAN: SAIDA NORMAN   FINDINGS: There is again total left hip arthroplasty. The prosthetic left femoral head appears to be subluxated laterally to the prosthetic acetabular cup; allowing for differences in technique the subluxation appears to have worsened since the prior exam. No acute fracture is seen. There is no lucency around the hardware to suggest loosening. Postsurgical gas again overlies the soft tissues.       Persistent worsening lateral subluxation of the prosthetic left femoral head in relation to the prosthetic acetabular cup.   Signed by: Benji Cat 1/11/2024 6:24 PM Dictation workstation:   [id number]    XR hip left with pelvis when performed 1 view    Result Date: 1/10/2024  Interpreted By:  Ramon Gamble,  STUDY: XR HIP LEFT WITH PELVIS WHEN PERFORMED 1 VIEW; ;  1/10/2024 3:55 pm   INDICATION: Signs/Symptoms:Post op hip.   COMPARISON: 11/09/2023   ACCESSION NUMBER(S): [id number]   ORDERING CLINICIAN: SADE ALTMAN   FINDINGS: Single limited view the left hip. Interval postsurgical changes of left total hip arthroplasty. There is soft tissue swelling and gas overlying the left hip.   On this single limited radiograph there appears to be slight lateral subluxation of the femoral head with respect to the acetabular cup. Correlation and possible repeat radiographs is recommended.      x-rays of the left ankle done and read in the office today as well as all x-rays listed above are reviewed with the patient in the office today.  See findings.     MACRO: None   Signed by: Ramon Gamble 1/10/2024 4:13 PM Dictation workstation:   [id number]      Ruth was seen today for follow-up.  Diagnoses and all orders for this visit:  Left ankle pain, unspecified chronicity (Primary)  Left hip pain  S/P hip replacement, left  Moderate left ankle sprain, initial encounter    Options are discussed with the patient in detail.  The patient is instructed regarding total hip position and infection precautions, activity modification and risk for further injury with falling or trauma and to use a cane for support while walking, ice, provider directed at home gentle strengthening and ROM exercises, and the appropriate use of Tylenol as needed for pain with its potential adverse reactions and side effects. The patient understands. Return as needed. Please note that this report has been produced using speech recognition software.  It may contain errors related to grammar, punctuation or spelling.  Electronically signed, but not reviewed. Tirso Medina MD

## 2024-10-18 ENCOUNTER — TELEPHONE (OUTPATIENT)
Dept: PRIMARY CARE | Facility: CLINIC | Age: 74
End: 2024-10-18

## 2024-10-18 ENCOUNTER — LAB (OUTPATIENT)
Dept: LAB | Facility: LAB | Age: 74
End: 2024-10-18
Payer: MEDICARE

## 2024-10-18 DIAGNOSIS — E55.9 VITAMIN D DEFICIENCY: ICD-10-CM

## 2024-10-18 DIAGNOSIS — I48.0 PAROXYSMAL ATRIAL FIBRILLATION (MULTI): ICD-10-CM

## 2024-10-18 DIAGNOSIS — E78.00 PURE HYPERCHOLESTEROLEMIA: ICD-10-CM

## 2024-10-18 LAB
25(OH)D3 SERPL-MCNC: 37 NG/ML (ref 30–100)
ALBUMIN SERPL BCP-MCNC: 4.4 G/DL (ref 3.4–5)
ALP SERPL-CCNC: 40 U/L (ref 33–136)
ALT SERPL W P-5'-P-CCNC: 13 U/L (ref 7–45)
ANION GAP SERPL CALCULATED.3IONS-SCNC: 10 MMOL/L (ref 10–20)
AST SERPL W P-5'-P-CCNC: 18 U/L (ref 9–39)
BASOPHILS # BLD AUTO: 0.06 X10*3/UL (ref 0–0.1)
BASOPHILS NFR BLD AUTO: 1.1 %
BILIRUB SERPL-MCNC: 0.8 MG/DL (ref 0–1.2)
BUN SERPL-MCNC: 19 MG/DL (ref 6–23)
CALCIUM SERPL-MCNC: 9.7 MG/DL (ref 8.6–10.3)
CHLORIDE SERPL-SCNC: 106 MMOL/L (ref 98–107)
CHOLEST SERPL-MCNC: 216 MG/DL (ref 0–199)
CHOLEST/HDLC SERPL: 3.2 {RATIO}
CO2 SERPL-SCNC: 30 MMOL/L (ref 21–32)
CREAT SERPL-MCNC: 0.72 MG/DL (ref 0.5–1.05)
DIGOXIN SERPL-MCNC: 2.01 NG/ML (ref 0.8–?)
EGFRCR SERPLBLD CKD-EPI 2021: 87 ML/MIN/1.73M*2
EOSINOPHIL # BLD AUTO: 0.43 X10*3/UL (ref 0–0.4)
EOSINOPHIL NFR BLD AUTO: 7.7 %
ERYTHROCYTE [DISTWIDTH] IN BLOOD BY AUTOMATED COUNT: 13.8 % (ref 11.5–14.5)
GLUCOSE SERPL-MCNC: 83 MG/DL (ref 74–99)
HCT VFR BLD AUTO: 46 % (ref 36–46)
HDLC SERPL-MCNC: 68.3 MG/DL
HGB BLD-MCNC: 14.2 G/DL (ref 12–16)
IMM GRANULOCYTES # BLD AUTO: 0.01 X10*3/UL (ref 0–0.5)
IMM GRANULOCYTES NFR BLD AUTO: 0.2 % (ref 0–0.9)
LDLC SERPL CALC-MCNC: 123 MG/DL
LYMPHOCYTES # BLD AUTO: 2.12 X10*3/UL (ref 0.8–3)
LYMPHOCYTES NFR BLD AUTO: 37.7 %
MCH RBC QN AUTO: 29.2 PG (ref 26–34)
MCHC RBC AUTO-ENTMCNC: 30.9 G/DL (ref 32–36)
MCV RBC AUTO: 95 FL (ref 80–100)
MONOCYTES # BLD AUTO: 0.81 X10*3/UL (ref 0.05–0.8)
MONOCYTES NFR BLD AUTO: 14.4 %
NEUTROPHILS # BLD AUTO: 2.19 X10*3/UL (ref 1.6–5.5)
NEUTROPHILS NFR BLD AUTO: 38.9 %
NON HDL CHOLESTEROL: 148 MG/DL (ref 0–149)
NRBC BLD-RTO: 0 /100 WBCS (ref 0–0)
PLATELET # BLD AUTO: 284 X10*3/UL (ref 150–450)
POTASSIUM SERPL-SCNC: 4.6 MMOL/L (ref 3.5–5.3)
PROT SERPL-MCNC: 6.9 G/DL (ref 6.4–8.2)
RBC # BLD AUTO: 4.87 X10*6/UL (ref 4–5.2)
SODIUM SERPL-SCNC: 141 MMOL/L (ref 136–145)
TRIGL SERPL-MCNC: 125 MG/DL (ref 0–149)
VLDL: 25 MG/DL (ref 0–40)
WBC # BLD AUTO: 5.6 X10*3/UL (ref 4.4–11.3)

## 2024-10-18 PROCEDURE — 85025 COMPLETE CBC W/AUTO DIFF WBC: CPT

## 2024-10-18 PROCEDURE — 82306 VITAMIN D 25 HYDROXY: CPT

## 2024-10-18 PROCEDURE — 36415 COLL VENOUS BLD VENIPUNCTURE: CPT

## 2024-10-18 PROCEDURE — 80061 LIPID PANEL: CPT

## 2024-10-18 PROCEDURE — 80053 COMPREHEN METABOLIC PANEL: CPT

## 2024-10-18 PROCEDURE — 80162 ASSAY OF DIGOXIN TOTAL: CPT

## 2024-10-21 RX ORDER — SULFACETAMIDE SODIUM 100 MG/ML
LOTION TOPICAL
COMMUNITY
Start: 2024-07-08

## 2024-10-21 RX ORDER — PIMECROLIMUS 10 MG/G
CREAM TOPICAL
COMMUNITY
Start: 2024-07-10

## 2024-10-22 ENCOUNTER — HOSPITAL ENCOUNTER (EMERGENCY)
Facility: HOSPITAL | Age: 74
Discharge: HOME | End: 2024-10-22
Attending: EMERGENCY MEDICINE
Payer: MEDICARE

## 2024-10-22 ENCOUNTER — APPOINTMENT (OUTPATIENT)
Dept: RADIOLOGY | Facility: HOSPITAL | Age: 74
End: 2024-10-22
Payer: MEDICARE

## 2024-10-22 ENCOUNTER — APPOINTMENT (OUTPATIENT)
Dept: CARDIOLOGY | Facility: HOSPITAL | Age: 74
End: 2024-10-22
Payer: MEDICARE

## 2024-10-22 ENCOUNTER — OFFICE VISIT (OUTPATIENT)
Dept: PRIMARY CARE | Facility: CLINIC | Age: 74
End: 2024-10-22
Payer: MEDICARE

## 2024-10-22 VITALS
RESPIRATION RATE: 18 BRPM | HEART RATE: 108 BPM | DIASTOLIC BLOOD PRESSURE: 57 MMHG | HEIGHT: 61 IN | WEIGHT: 125 LBS | OXYGEN SATURATION: 97 % | BODY MASS INDEX: 23.6 KG/M2 | SYSTOLIC BLOOD PRESSURE: 117 MMHG

## 2024-10-22 VITALS
SYSTOLIC BLOOD PRESSURE: 144 MMHG | HEART RATE: 99 BPM | DIASTOLIC BLOOD PRESSURE: 90 MMHG | OXYGEN SATURATION: 98 % | TEMPERATURE: 97.5 F | HEIGHT: 62 IN | BODY MASS INDEX: 23 KG/M2 | WEIGHT: 125 LBS

## 2024-10-22 DIAGNOSIS — I48.91 RAPID ATRIAL FIBRILLATION (MULTI): Primary | ICD-10-CM

## 2024-10-22 DIAGNOSIS — K21.9 GASTROESOPHAGEAL REFLUX DISEASE WITHOUT ESOPHAGITIS: ICD-10-CM

## 2024-10-22 DIAGNOSIS — M81.0 AGE-RELATED OSTEOPOROSIS WITHOUT CURRENT PATHOLOGICAL FRACTURE: ICD-10-CM

## 2024-10-22 DIAGNOSIS — I48.0 PAROXYSMAL ATRIAL FIBRILLATION (MULTI): Primary | ICD-10-CM

## 2024-10-22 DIAGNOSIS — E55.9 VITAMIN D DEFICIENCY: ICD-10-CM

## 2024-10-22 DIAGNOSIS — I35.1 AORTIC VALVE INSUFFICIENCY, ETIOLOGY OF CARDIAC VALVE DISEASE UNSPECIFIED: ICD-10-CM

## 2024-10-22 DIAGNOSIS — Z23 ENCOUNTER FOR IMMUNIZATION: ICD-10-CM

## 2024-10-22 LAB
ALBUMIN SERPL BCP-MCNC: 4.6 G/DL (ref 3.4–5)
ALP SERPL-CCNC: 45 U/L (ref 33–136)
ALT SERPL W P-5'-P-CCNC: 13 U/L (ref 7–45)
ANION GAP SERPL CALCULATED.3IONS-SCNC: 13 MMOL/L (ref 10–20)
AST SERPL W P-5'-P-CCNC: 22 U/L (ref 9–39)
BASOPHILS # BLD AUTO: 0.07 X10*3/UL (ref 0–0.1)
BASOPHILS NFR BLD AUTO: 1 %
BILIRUB SERPL-MCNC: 0.5 MG/DL (ref 0–1.2)
BUN SERPL-MCNC: 17 MG/DL (ref 6–23)
CALCIUM SERPL-MCNC: 9.8 MG/DL (ref 8.6–10.3)
CARDIAC TROPONIN I PNL SERPL HS: 3 NG/L (ref 0–13)
CARDIAC TROPONIN I PNL SERPL HS: 4 NG/L (ref 0–13)
CHLORIDE SERPL-SCNC: 105 MMOL/L (ref 98–107)
CO2 SERPL-SCNC: 25 MMOL/L (ref 21–32)
CREAT SERPL-MCNC: 0.69 MG/DL (ref 0.5–1.05)
DIGOXIN SERPL-MCNC: 0.54 NG/ML (ref 0.8–?)
EGFRCR SERPLBLD CKD-EPI 2021: >90 ML/MIN/1.73M*2
EOSINOPHIL # BLD AUTO: 0.46 X10*3/UL (ref 0–0.4)
EOSINOPHIL NFR BLD AUTO: 6.7 %
ERYTHROCYTE [DISTWIDTH] IN BLOOD BY AUTOMATED COUNT: 13.7 % (ref 11.5–14.5)
GLUCOSE SERPL-MCNC: 85 MG/DL (ref 74–99)
HCT VFR BLD AUTO: 45 % (ref 36–46)
HGB BLD-MCNC: 14.6 G/DL (ref 12–16)
IMM GRANULOCYTES # BLD AUTO: 0.01 X10*3/UL (ref 0–0.5)
IMM GRANULOCYTES NFR BLD AUTO: 0.1 % (ref 0–0.9)
LYMPHOCYTES # BLD AUTO: 2.79 X10*3/UL (ref 0.8–3)
LYMPHOCYTES NFR BLD AUTO: 40.8 %
MAGNESIUM SERPL-MCNC: 2.25 MG/DL (ref 1.6–2.4)
MCH RBC QN AUTO: 30 PG (ref 26–34)
MCHC RBC AUTO-ENTMCNC: 32.4 G/DL (ref 32–36)
MCV RBC AUTO: 93 FL (ref 80–100)
MONOCYTES # BLD AUTO: 0.92 X10*3/UL (ref 0.05–0.8)
MONOCYTES NFR BLD AUTO: 13.5 %
NEUTROPHILS # BLD AUTO: 2.58 X10*3/UL (ref 1.6–5.5)
NEUTROPHILS NFR BLD AUTO: 37.9 %
NRBC BLD-RTO: 0 /100 WBCS (ref 0–0)
PLATELET # BLD AUTO: 276 X10*3/UL (ref 150–450)
POTASSIUM SERPL-SCNC: 4.1 MMOL/L (ref 3.5–5.3)
PROT SERPL-MCNC: 7.7 G/DL (ref 6.4–8.2)
RBC # BLD AUTO: 4.86 X10*6/UL (ref 4–5.2)
SODIUM SERPL-SCNC: 139 MMOL/L (ref 136–145)
WBC # BLD AUTO: 6.8 X10*3/UL (ref 4.4–11.3)

## 2024-10-22 PROCEDURE — 96374 THER/PROPH/DIAG INJ IV PUSH: CPT

## 2024-10-22 PROCEDURE — 36415 COLL VENOUS BLD VENIPUNCTURE: CPT | Performed by: EMERGENCY MEDICINE

## 2024-10-22 PROCEDURE — 1160F RVW MEDS BY RX/DR IN RCRD: CPT | Performed by: INTERNAL MEDICINE

## 2024-10-22 PROCEDURE — 93005 ELECTROCARDIOGRAM TRACING: CPT

## 2024-10-22 PROCEDURE — 84484 ASSAY OF TROPONIN QUANT: CPT | Performed by: EMERGENCY MEDICINE

## 2024-10-22 PROCEDURE — 71045 X-RAY EXAM CHEST 1 VIEW: CPT | Performed by: RADIOLOGY

## 2024-10-22 PROCEDURE — 2500000004 HC RX 250 GENERAL PHARMACY W/ HCPCS (ALT 636 FOR OP/ED): Performed by: PHYSICIAN ASSISTANT

## 2024-10-22 PROCEDURE — 99214 OFFICE O/P EST MOD 30 MIN: CPT | Performed by: INTERNAL MEDICINE

## 2024-10-22 PROCEDURE — G2211 COMPLEX E/M VISIT ADD ON: HCPCS | Performed by: INTERNAL MEDICINE

## 2024-10-22 PROCEDURE — 80162 ASSAY OF DIGOXIN TOTAL: CPT | Performed by: EMERGENCY MEDICINE

## 2024-10-22 PROCEDURE — 1159F MED LIST DOCD IN RCRD: CPT | Performed by: INTERNAL MEDICINE

## 2024-10-22 PROCEDURE — 85025 COMPLETE CBC W/AUTO DIFF WBC: CPT | Performed by: EMERGENCY MEDICINE

## 2024-10-22 PROCEDURE — 90662 IIV NO PRSV INCREASED AG IM: CPT | Performed by: INTERNAL MEDICINE

## 2024-10-22 PROCEDURE — 1157F ADVNC CARE PLAN IN RCRD: CPT | Performed by: INTERNAL MEDICINE

## 2024-10-22 PROCEDURE — 93010 ELECTROCARDIOGRAM REPORT: CPT | Performed by: INTERNAL MEDICINE

## 2024-10-22 PROCEDURE — 2500000001 HC RX 250 WO HCPCS SELF ADMINISTERED DRUGS (ALT 637 FOR MEDICARE OP): Performed by: PHYSICIAN ASSISTANT

## 2024-10-22 PROCEDURE — 96376 TX/PRO/DX INJ SAME DRUG ADON: CPT

## 2024-10-22 PROCEDURE — 99214 OFFICE O/P EST MOD 30 MIN: CPT | Mod: 25 | Performed by: INTERNAL MEDICINE

## 2024-10-22 PROCEDURE — 99284 EMERGENCY DEPT VISIT MOD MDM: CPT | Mod: 25

## 2024-10-22 PROCEDURE — 83735 ASSAY OF MAGNESIUM: CPT | Performed by: EMERGENCY MEDICINE

## 2024-10-22 PROCEDURE — 71045 X-RAY EXAM CHEST 1 VIEW: CPT

## 2024-10-22 PROCEDURE — 80053 COMPREHEN METABOLIC PANEL: CPT | Performed by: EMERGENCY MEDICINE

## 2024-10-22 RX ORDER — METOPROLOL TARTRATE 1 MG/ML
5 INJECTION, SOLUTION INTRAVENOUS ONCE
Status: COMPLETED | OUTPATIENT
Start: 2024-10-22 | End: 2024-10-22

## 2024-10-22 RX ORDER — METOPROLOL TARTRATE 25 MG/1
25 TABLET, FILM COATED ORAL ONCE
Status: COMPLETED | OUTPATIENT
Start: 2024-10-22 | End: 2024-10-22

## 2024-10-22 ASSESSMENT — PAIN SCALES - GENERAL
PAINLEVEL_OUTOF10: 0 - NO PAIN

## 2024-10-22 ASSESSMENT — LIFESTYLE VARIABLES
EVER HAD A DRINK FIRST THING IN THE MORNING TO STEADY YOUR NERVES TO GET RID OF A HANGOVER: NO
HAVE PEOPLE ANNOYED YOU BY CRITICIZING YOUR DRINKING: NO
HAVE YOU EVER FELT YOU SHOULD CUT DOWN ON YOUR DRINKING: NO
TOTAL SCORE: 0
EVER FELT BAD OR GUILTY ABOUT YOUR DRINKING: NO

## 2024-10-22 ASSESSMENT — PAIN - FUNCTIONAL ASSESSMENT: PAIN_FUNCTIONAL_ASSESSMENT: 0-10

## 2024-10-22 NOTE — PROGRESS NOTES
[facility]: [facility]  PROGRESS NOTE      Ruth Veloz is a 74 y.o. female that is being seen  today for Establish Care (EIS PT TrANSFER ).  Subjective   HPI  ROS  Negative for fever or chills  Negative for sore throat, ear pain, nasal discharge  Negative for cough, shortness of breath or wheezing  Negative for chest pain, palpitations, swelling of legs  Negative for abdominal pain, constipation, diarrhea, blood in the stools  Negative for urinary complaints  Negative for headache, dizziness, weakness or numbness  Negative for joint pain  Negative for depression or anxiety  All other systems reviewed and were negative   Vitals:    10/22/24 1519   BP: 144/90   Pulse: 99   Temp: 36.4 °C (97.5 °F)   SpO2: 98%      Vitals:    10/22/24 1519   Weight: 56.7 kg (125 lb)     Body mass index is 23.24 kg/m².  Physical Exam  Constitutional: Patient does not appear to be in any acute distress  Head and Face: NCAT  Eyes: Normal external exam, EOMI  ENT: Normal external inspection of ears and nose. Oropharynx normal.  Cardiovascular: RRR, S1/S2, no murmurs, rubs, or gallops, radial pulses +2, no edema of extremities  Pulmonary: CTAB, no respiratory distress.  Abdomen: +BS, soft, non-tender, nondistended, no guarding or rebound, no masses noted  MSK: No joint swelling, normal movements of all extremities. Range of motion- normal.  Skin- No lesions, contusions, or erythema.  Peripheral puslses palpable bilaterally 2+  Neuro: AAO X3, Cranial nerves 2-12 grossly intact,DTR 2+ in all 4 limbs   Psychiatric: Judgment intact. Appropriate mood and behavior    LABS   [unfilled]  Lab Results   Component Value Date    GLUCOSE 83 10/18/2024    CALCIUM 9.7 10/18/2024     10/18/2024    K 4.6 10/18/2024    CO2 30 10/18/2024     10/18/2024    BUN 19 10/18/2024    CREATININE 0.72 10/18/2024     Lab Results   Component Value Date    ALT 13 10/18/2024    AST 18 10/18/2024    ALKPHOS 40 10/18/2024    BILITOT  "0.8 10/18/2024     Lab Results   Component Value Date    WBC 5.6 10/18/2024    HGB 14.2 10/18/2024    HCT 46.0 10/18/2024    MCV 95 10/18/2024     10/18/2024     Lab Results   Component Value Date    CHOL 216 (H) 10/18/2024    CHOL 206 (H) 09/08/2023    CHOL 212 (H) 09/01/2022     Lab Results   Component Value Date    HDL 68.3 10/18/2024    HDL 68 09/08/2023    HDL 76 09/01/2022     Lab Results   Component Value Date    LDLCALC 123 (H) 10/18/2024    LDLCALC 112 09/08/2023    LDLCALC 116 09/01/2022     Lab Results   Component Value Date    TRIG 125 10/18/2024    TRIG 132 09/08/2023    TRIG 98 09/01/2022     No results found for: \"HGBA1C\"  Other labs not included in the list above were reviewed either before or during this encounter.    History    Past Medical History:   Diagnosis Date    A-fib (Multi)     GERD (gastroesophageal reflux disease)     Hip pain, acute, left     Osteoporosis      3/23 DExa Katty T-1.3, hip neck T-1.7, forearm T-2.6 Prolia over due 4/24    Personal history of diseases of the blood and blood-forming organs and certain disorders involving the immune mechanism     History of autoimmune disorder    Personal history of other diseases of the digestive system     History of gastroesophageal reflux (GERD)     Past Surgical History:   Procedure Laterality Date    HIP SURGERY Left 01/10/2024    total    OTHER SURGICAL HISTORY  12/13/2021    Balloon sinuplasty    SMALL INTESTINE SURGERY  2001    twisted bowel repair     Family History   Problem Relation Name Age of Onset    Other (leaky valve) Mother      Stroke Mother      Hypertension Mother      Kidney failure Father      Cushing syndrome Sister      No Known Problems Brother      No Known Problems Daughter       Allergies   Allergen Reactions    Amoxicillin Hives    Amoxicillin-Pot Clavulanate Hives    Flunisolide Hives    Penicillin Hives    Sulfa (Sulfonamide Antibiotics) Unknown     Current Outpatient Medications on File Prior " to Visit   Medication Sig Dispense Refill    digoxin (Lanoxin) 125 MCG tablet Take 1 tablet (125 mcg) by mouth once daily. (Patient taking differently: Take 1 tablet (125 mcg) by mouth every other day.) 90 tablet 3    Eliquis 5 mg tablet TAKE 1 TABLET BY MOUTH TWICE  DAILY AS DIRECTED 180 tablet 3    famotidine (Pepcid) 20 mg tablet Take 1 tablet (20 mg) by mouth early in the morning..      fluticasone (Flonase) 50 mcg/actuation nasal spray Administer 1-2 sprays into each nostril once daily.      metoprolol tartrate (Lopressor) 25 mg tablet Take 1 tablet (25 mg) by mouth 2 times a day as needed.      pimecrolimus (Elidel) 1 % cream apply to affected area as directed twice a day      sotalol (Betapace) 80 mg tablet TAKE ONE-HALF TABLET BY MOUTH IN THE MORNING AND 1 TABLET BY  MOUTH AT NIGHT 135 tablet 3    sulfacetamide suspension (Klaron) 10 % suspension topical apply to affected area as directed twice a day       Current Facility-Administered Medications on File Prior to Visit   Medication Dose Route Frequency Provider Last Rate Last Admin    denosumab (Prolia) injection 60 mg  60 mg subcutaneous q6 months German Espinoza MD   60 mg at 05/16/24 0908     Immunization History   Administered Date(s) Administered    Flu vaccine, quadrivalent, high-dose, preservative free, age 65y+ (FLUZONE) 10/05/2020, 12/07/2021, 10/25/2022, 09/12/2023    Flu vaccine, trivalent, preservative free, HIGH-DOSE, age 65y+ (Fluzone) 09/25/2017, 10/01/2018, 10/07/2019, 10/19/2020, 10/22/2024    Influenza, injectable, quadrivalent 11/01/2016    Influenza, seasonal, injectable 10/25/2010, 09/27/2011, 10/23/2012, 09/18/2013, 09/26/2014, 10/28/2015, 10/03/2016    Novel influenza-H1N1-09, preservative-free 12/06/2009    Pfizer COVID-19 vaccine, 12 years and older, (30mcg/0.3mL) (Comirnaty) 12/01/2023    Pfizer Purple Cap SARS-CoV-2 03/08/2021, 03/24/2021, 10/01/2021    Pneumococcal conjugate vaccine, 13-valent (PREVNAR 13) 09/21/2015     Pneumococcal polysaccharide vaccine, 23-valent, age 2 years and older (PNEUMOVAX 23) 09/26/2014, 08/17/2021    RSV, 60 Years And Older (AREXVY) 12/16/2023    Tdap vaccine, age 7 year and older (BOOSTRIX, ADACEL) 06/01/2007, 07/07/2017    Zoster vaccine, recombinant, adult (SHINGRIX) 11/07/2019, 01/06/2020    Zoster, live 05/20/2010     Patient's medical history was reviewed and updated either before or during this encounter.  ASSESSMENT / PLAN:  Diagnoses and all orders for this visit:  Paroxysmal atrial fibrillation (Multi)  -     Digoxin level; Future  -     ECG 12 lead (Clinic Performed)  Encounter for immunization  Aortic valve insufficiency, etiology of cardiac valve disease unspecified  Vitamin D deficiency  Age-related osteoporosis without current pathological fracture  Gastroesophageal reflux disease without esophagitis  Other orders  -     Flu vaccine, trivalent, preservative free, HIGH-DOSE, age 65y+ (Fluzone)          Constantino Milton MD

## 2024-10-22 NOTE — ED TRIAGE NOTES
Pt came from pcp for primary visit, pt was sent for abnormal EKG and high heart rate, pt hr 151 afib rvr. Pt states she's weak and feels like her heart is racing pt having slight sob

## 2024-10-22 NOTE — ED PROVIDER NOTES
HPI   Chief Complaint   Patient presents with    Palpitations     Patient was at Dr. Roberto today for an office visit and she was having palpitations, they did an EKG that showed a-fib RVR at 145.  Patient with hx of a-fib, takes eliquis.       74-year-old female presented emergency department from her physician office with chief complaint of rapid atrial fibrillation.  History of atrial fibrillation.  On digoxin and metoprolol.  Did not take her metoprolol today.  She is mostly asymptomatic from this.  This was a routine appointment.  She denies chest pain, shortness of breath numbness weakness.  Well-appearing nontoxic.  She is anticoagulated has been compliant with anticoagulation.  No other complaint.              Patient History   Past Medical History:   Diagnosis Date    A-fib (Multi)     GERD (gastroesophageal reflux disease)     Hip pain, acute, left     Osteoporosis      3/23 DExa Katty T-1.3, hip neck T-1.7, forearm T-2.6 Prolia over due 4/24    Personal history of diseases of the blood and blood-forming organs and certain disorders involving the immune mechanism     History of autoimmune disorder    Personal history of other diseases of the digestive system     History of gastroesophageal reflux (GERD)     Past Surgical History:   Procedure Laterality Date    HIP SURGERY Left 01/10/2024    total    OTHER SURGICAL HISTORY  12/13/2021    Balloon sinuplasty    SMALL INTESTINE SURGERY  2001    twisted bowel repair     Family History   Problem Relation Name Age of Onset    Other (leaky valve) Mother      Stroke Mother      Hypertension Mother      Kidney failure Father      Cushing syndrome Sister      No Known Problems Brother      No Known Problems Daughter       Social History     Tobacco Use    Smoking status: Never     Passive exposure: Never    Smokeless tobacco: Never   Vaping Use    Vaping status: Never Used   Substance Use Topics    Alcohol use: Not Currently    Drug use: Never        Physical Exam   ED Triage Vitals   Temp Heart Rate Respirations BP   -- 10/22/24 1719 10/22/24 1716 10/22/24 1719    (!) 151 18 (!) 149/98      Pulse Ox Temp src Heart Rate Source Patient Position   10/22/24 1746 -- 10/22/24 1716 10/22/24 1746   96 %  Monitor Sitting      BP Location FiO2 (%)     10/22/24 1746 --     Right arm        Physical Exam  Vitals and nursing note reviewed.   Constitutional:       Appearance: Normal appearance.   HENT:      Head: Normocephalic.      Nose: Nose normal.      Mouth/Throat:      Mouth: Mucous membranes are moist.   Cardiovascular:      Rate and Rhythm: Tachycardia present. Rhythm irregular.   Pulmonary:      Effort: Pulmonary effort is normal.   Abdominal:      General: Abdomen is flat.   Musculoskeletal:         General: Normal range of motion.      Cervical back: Normal range of motion.   Skin:     General: Skin is warm.   Neurological:      General: No focal deficit present.      Mental Status: She is alert and oriented to person, place, and time.   Psychiatric:         Mood and Affect: Mood normal.           ED Course & MDM   ED Course as of 10/22/24 1933   Tue Oct 22, 2024   1743 EKG personally interpreted by me performed at 1729  Atrial flutter with variable conduction and ventricular rate 111 normal axis no acute ischemic changes discordant with computer read of atrial fibrillation [EF]      ED Course User Index  [EF] Rae White, DO         Diagnoses as of 10/22/24 1933   Rapid atrial fibrillation (Multi)                 No data recorded                                 Medical Decision Making  I have seen and evaluated this patient.  The attending physician has also seen and evaluated this patient.  Vital signs, laboratory testing and diagnostic images if applicable have been reviewed.  All laboratory and imaging is interpreted by myself unless otherwise stated.  Radiology studies are also formally interpreted by radiologist.    CBC without significant  leukocytosis or anemia, metabolic panel without significant renal impairment or electrolyte abnormality.  Troponin within an appropriate range without significant delta change, chest x-ray without actionable cardiopulmonary process, EKG without evidence of acute ischemia.  Patient given dose of home metoprolol in addition to 5 mg IV metoprolol with subsequent rate control.  Troponins are negative.  Patient released in stable condition for outpatient cardiology follow-up.  Heart rate is currently in the high 80s to low 90 range.    I have seen and evaluated this patient and independently provided 31 minutes of nonconcurrent critical care time. This does not include separately billable procedures. Patient with high potential for deterioration, required frequent monitoring and assessment.    Potassium Given (last 12 hours)     None    Labs Reviewed  CBC WITH AUTO DIFFERENTIAL - Abnormal     WBC                           6.8                    nRBC                          0.0                    RBC                           4.86                   Hemoglobin                    14.6                   Hematocrit                    45.0                   MCV                           93                     MCH                           30.0                   MCHC                          32.4                   RDW                           13.7                   Platelets                     276                    Neutrophils %                 37.9                   Immature Granulocytes %, Automated   0.1                    Lymphocytes %                 40.8                   Monocytes %                   13.5                   Eosinophils %                 6.7                    Basophils %                   1.0                    Neutrophils Absolute          2.58                   Immature Granulocytes Absolute, Au*   0.01                   Lymphocytes Absolute          2.79                   Monocytes Absolute             0.92 (*)               Eosinophils Absolute          0.46 (*)               Basophils Absolute            0.07                COMPREHENSIVE METABOLIC PANEL - Normal     Glucose                       85                     Sodium                        139                    Potassium                     4.1                    Chloride                      105                    Bicarbonate                   25                     Anion Gap                     13                     Urea Nitrogen                 17                     Creatinine                    0.69                   eGFR                          >90                    Calcium                       9.8                    Albumin                       4.6                    Alkaline Phosphatase          45                     Total Protein                 7.7                    AST                           22                     Bilirubin, Total              0.5                    ALT                           13                  MAGNESIUM - Normal     Magnesium                     2.25                SERIAL TROPONIN-INITIAL - Normal     Troponin I, High Sensitivity   3                        Narrative: Less than 99th percentile of normal range cutoff-                Female and children under 18 years old <14 ng/L; Male <21 ng/L: Negative                Repeat testing should be performed if clinically indicated.                                 Female and children under 18 years old 14-50 ng/L; Male 21-50 ng/L:                Consistent with possible cardiac damage and possible increased clinical                 risk. Serial measurements may help to assess extent of myocardial damage.                                 >50 ng/L: Consistent with cardiac damage, increased clinical risk and                myocardial infarction. Serial measurements may help assess extent of                 myocardial damage.                                  NOTE:  Children less than 1 year old may have higher baseline troponin                 levels and results should be interpreted in conjunction with the overall                 clinical context.                                 NOTE: Troponin I testing is performed using a different                 testing methodology at [facility]3] than at other                 [facility] hospitals. Direct result comparisons should only                 be made within the same method.  TROPONIN SERIES- (INITIAL, 1 HR)       Narrative: The following orders were created for panel order Troponin I Series, High Sensitivity (0, 1 HR).                Procedure                               Abnormality         Status                                   ---------                               -----------         ------                                   Troponin I, High Sensiti...[[id number]]  Normal              Final result                             Troponin, High Sensitivi...[[id number]]                      In process                                               Please view results for these tests on the individual orders.  SERIAL TROPONIN, 1 HOUR  DIGOXIN  XR chest 1 view   Final Result    Streaky left basilar opacities favored to be due to atelectasis.          Signed by: Fallon Swanson 10/22/2024 5:51 PM    Dictation workstation:   [id number]     Medications  metoprolol tartrate (Lopressor) injection 5 mg (5 mg intravenous Given 10/22/24 1748)  metoprolol tartrate (Lopressor) tablet 25 mg (25 mg oral Given 10/22/24 1748)  metoprolol tartrate (Lopressor) injection 5 mg (5 mg intravenous Given 10/22/24 1900)  New Prescriptions  No medications on file            Procedure  Procedures     Rios Rodriguez PA-C  10/22/24 1934

## 2024-10-22 NOTE — PATIENT INSTRUCTIONS
Take metoprolol regularly daily twice  a day and follow up with .  Will repeat Digoxin level in next few days .

## 2024-10-23 LAB
Q ONSET: 222 MS
QRS COUNT: 18 BEATS
QRS DURATION: 74 MS
QT INTERVAL: 288 MS
QTC CALCULATION(BAZETT): 391 MS
QTC FREDERICIA: 353 MS
R AXIS: 51 DEGREES
T AXIS: -20 DEGREES
T OFFSET: 366 MS
VENTRICULAR RATE: 111 BPM

## 2024-10-23 NOTE — PROGRESS NOTES
[facility]: [facility]  PROGRESS NOTE      Ruth Veloz is a 74 y.o. female that is being seen  today for Establish Care (EIS PT TrANSFER ).  Subjective   Patient is a 74-year-old female with history of atrial fibrillation, osteoporosis and gastroesophageal reflux disease who is being seen for follow-up visit as well as to establish a new primary care physician.  Patient does follow-up with the cardiologist and is on digoxin, sotalol and Eliquis for atrial fibrillation.  Patient is also on as needed metoprolol.  Patient denies any shortness of breath but does get some palpitations.      ROS  Negative for fever or chills  Negative for sore throat, ear pain, nasal discharge  Negative for cough, shortness of breath or wheezing  Negative for chest pain, palpitations, swelling of legs  Negative for abdominal pain, constipation, diarrhea, blood in the stools  Negative for urinary complaints  Negative for headache, dizziness, weakness or numbness  Negative for joint pain  Negative for depression or anxiety  All other systems reviewed and were negative   Vitals:    10/22/24 1519   BP: 144/90   Pulse: 99   Temp: 36.4 °C (97.5 °F)   SpO2: 98%      Vitals:    10/22/24 1519   Weight: 56.7 kg (125 lb)     Body mass index is 23.24 kg/m².  Physical Exam  Constitutional: Patient does not appear to be in any acute distress  Head and Face: NCAT  Eyes: Normal external exam, EOMI  ENT: Normal external inspection of ears and nose. Oropharynx normal.  Cardiovascular: Irregularly irregular heart rate  Pulmonary: CTAB, no respiratory distress.  Abdomen: +BS, soft, non-tender, nondistended, no guarding or rebound, no masses noted  MSK: No joint swelling, normal movements of all extremities. Range of motion- normal.  Skin- No lesions, contusions, or erythema.  Peripheral puslses palpable bilaterally 2+  Neuro: AAO X3, Cranial nerves 2-12 grossly intact,DTR 2+ in all 4 limbs   Psychiatric: Judgment intact.  "Appropriate mood and behavior    LABS   [unfilled]  Lab Results   Component Value Date    GLUCOSE 85 10/22/2024    CALCIUM 9.8 10/22/2024     10/22/2024    K 4.1 10/22/2024    CO2 25 10/22/2024     10/22/2024    BUN 17 10/22/2024    CREATININE 0.69 10/22/2024     Lab Results   Component Value Date    ALT 13 10/22/2024    AST 22 10/22/2024    ALKPHOS 45 10/22/2024    BILITOT 0.5 10/22/2024     Lab Results   Component Value Date    WBC 6.8 10/22/2024    HGB 14.6 10/22/2024    HCT 45.0 10/22/2024    MCV 93 10/22/2024     10/22/2024     Lab Results   Component Value Date    CHOL 216 (H) 10/18/2024    CHOL 206 (H) 09/08/2023    CHOL 212 (H) 09/01/2022     Lab Results   Component Value Date    HDL 68.3 10/18/2024    HDL 68 09/08/2023    HDL 76 09/01/2022     Lab Results   Component Value Date    LDLCALC 123 (H) 10/18/2024    LDLCALC 112 09/08/2023    LDLCALC 116 09/01/2022     Lab Results   Component Value Date    TRIG 125 10/18/2024    TRIG 132 09/08/2023    TRIG 98 09/01/2022     No results found for: \"HGBA1C\"  Other labs not included in the list above were reviewed either before or during this encounter.    History    Past Medical History:   Diagnosis Date    A-fib (Multi)     GERD (gastroesophageal reflux disease)     Hip pain, acute, left     Osteoporosis      3/23 DExa Katty T-1.3, hip neck T-1.7, forearm T-2.6 Prolia over due 4/24    Personal history of diseases of the blood and blood-forming organs and certain disorders involving the immune mechanism     History of autoimmune disorder    Personal history of other diseases of the digestive system     History of gastroesophageal reflux (GERD)     Past Surgical History:   Procedure Laterality Date    HIP SURGERY Left 01/10/2024    total    OTHER SURGICAL HISTORY  12/13/2021    Balloon sinuplasty    SMALL INTESTINE SURGERY  2001    twisted bowel repair     Family History   Problem Relation Name Age of Onset    Other (leaky valve) Mother   "    Stroke Mother      Hypertension Mother      Kidney failure Father      Cushing syndrome Sister      No Known Problems Brother      No Known Problems Daughter       Allergies   Allergen Reactions    Amoxicillin Hives    Amoxicillin-Pot Clavulanate Hives    Flunisolide Hives    Penicillin Hives    Sulfa (Sulfonamide Antibiotics) Unknown     Current Outpatient Medications on File Prior to Visit   Medication Sig Dispense Refill    digoxin (Lanoxin) 125 MCG tablet Take 1 tablet (125 mcg) by mouth once daily. (Patient taking differently: Take 1 tablet (125 mcg) by mouth every other day.) 90 tablet 3    Eliquis 5 mg tablet TAKE 1 TABLET BY MOUTH TWICE  DAILY AS DIRECTED 180 tablet 3    famotidine (Pepcid) 20 mg tablet Take 1 tablet (20 mg) by mouth early in the morning..      fluticasone (Flonase) 50 mcg/actuation nasal spray Administer 1-2 sprays into each nostril once daily.      metoprolol tartrate (Lopressor) 25 mg tablet Take 1 tablet (25 mg) by mouth 2 times a day as needed.      pimecrolimus (Elidel) 1 % cream apply to affected area as directed twice a day      sotalol (Betapace) 80 mg tablet TAKE ONE-HALF TABLET BY MOUTH IN THE MORNING AND 1 TABLET BY  MOUTH AT NIGHT 135 tablet 3    sulfacetamide suspension (Klaron) 10 % suspension topical apply to affected area as directed twice a day       Current Facility-Administered Medications on File Prior to Visit   Medication Dose Route Frequency Provider Last Rate Last Admin    denosumab (Prolia) injection 60 mg  60 mg subcutaneous q6 months German Espinoza MD   60 mg at 05/16/24 0908     Immunization History   Administered Date(s) Administered    Flu vaccine, quadrivalent, high-dose, preservative free, age 65y+ (FLUZONE) 10/05/2020, 12/07/2021, 10/25/2022, 09/12/2023    Flu vaccine, trivalent, preservative free, HIGH-DOSE, age 65y+ (Fluzone) 09/25/2017, 10/01/2018, 10/07/2019, 10/19/2020, 10/22/2024    Influenza, injectable, quadrivalent 11/01/2016    Influenza,  seasonal, injectable 10/25/2010, 09/27/2011, 10/23/2012, 09/18/2013, 09/26/2014, 10/28/2015, 10/03/2016    Novel influenza-H1N1-09, preservative-free 12/06/2009    Pfizer COVID-19 vaccine, 12 years and older, (30mcg/0.3mL) (Comirnaty) 12/01/2023    Pfizer Purple Cap SARS-CoV-2 03/08/2021, 03/24/2021, 10/01/2021    Pneumococcal conjugate vaccine, 13-valent (PREVNAR 13) 09/21/2015    Pneumococcal polysaccharide vaccine, 23-valent, age 2 years and older (PNEUMOVAX 23) 09/26/2014, 08/17/2021    RSV, 60 Years And Older (AREXVY) 12/16/2023    Tdap vaccine, age 7 year and older (BOOSTRIX, ADACEL) 06/01/2007, 07/07/2017    Zoster vaccine, recombinant, adult (SHINGRIX) 11/07/2019, 01/06/2020    Zoster, live 05/20/2010     Patient's medical history was reviewed and updated either before or during this encounter.  ASSESSMENT / PLAN:  Diagnoses and all orders for this visit:  Paroxysmal atrial fibrillation (Multi)  -     Digoxin level; Future  -     ECG 12 lead (Clinic Performed)  -     TSH with reflex to Free T4 if abnormal; Future  Encounter for immunization  Aortic valve insufficiency, etiology of cardiac valve disease unspecified  Vitamin D deficiency  Age-related osteoporosis without current pathological fracture  Gastroesophageal reflux disease without esophagitis  Other orders  -     Flu vaccine, trivalent, preservative free, HIGH-DOSE, age 65y+ (Fluzone)    Patient is being seen for follow-up visit as well as to establish a new primary care physician.  Patient heart rate is elevated.  EKG shows atrial flutter.  Patient is being recommended to be seen in the ER for possible IV medications to get her heart rate controlled.  Patient is already on Eliquis.  Patient is also due for Prolia shot in next few days.      Constantino Milton MD

## 2024-10-24 ENCOUNTER — OFFICE VISIT (OUTPATIENT)
Age: 74
End: 2024-10-24
Payer: MEDICARE

## 2024-10-24 VITALS
RESPIRATION RATE: 14 BRPM | BODY MASS INDEX: 22.82 KG/M2 | WEIGHT: 124 LBS | TEMPERATURE: 98.6 F | DIASTOLIC BLOOD PRESSURE: 80 MMHG | HEART RATE: 63 BPM | OXYGEN SATURATION: 98 % | HEIGHT: 62 IN | SYSTOLIC BLOOD PRESSURE: 122 MMHG

## 2024-10-24 DIAGNOSIS — I34.0 NONRHEUMATIC MITRAL VALVE REGURGITATION: ICD-10-CM

## 2024-10-24 DIAGNOSIS — I48.0 PAROXYSMAL ATRIAL FIBRILLATION (MULTI): Primary | ICD-10-CM

## 2024-10-24 PROCEDURE — 1126F AMNT PAIN NOTED NONE PRSNT: CPT | Performed by: INTERNAL MEDICINE

## 2024-10-24 PROCEDURE — 99213 OFFICE O/P EST LOW 20 MIN: CPT | Performed by: INTERNAL MEDICINE

## 2024-10-24 PROCEDURE — 1159F MED LIST DOCD IN RCRD: CPT | Performed by: INTERNAL MEDICINE

## 2024-10-24 PROCEDURE — 1036F TOBACCO NON-USER: CPT | Performed by: INTERNAL MEDICINE

## 2024-10-24 PROCEDURE — 1157F ADVNC CARE PLAN IN RCRD: CPT | Performed by: INTERNAL MEDICINE

## 2024-10-24 RX ORDER — METOPROLOL TARTRATE 25 MG/1
12.5 TABLET, FILM COATED ORAL 2 TIMES DAILY
Qty: 90 TABLET | Refills: 3 | Status: SHIPPED | OUTPATIENT
Start: 2024-10-24 | End: 2025-10-24

## 2024-10-24 RX ORDER — METOPROLOL TARTRATE 25 MG/1
12.5 TABLET, FILM COATED ORAL 2 TIMES DAILY
Qty: 30 TABLET | Refills: 11 | Status: SHIPPED | OUTPATIENT
Start: 2024-10-24 | End: 2024-10-24 | Stop reason: SDUPTHER

## 2024-10-24 ASSESSMENT — LIFESTYLE VARIABLES
HAVE YOU OR SOMEONE ELSE BEEN INJURED AS A RESULT OF YOUR DRINKING: NO
AUDIT TOTAL SCORE: 0
HOW MANY STANDARD DRINKS CONTAINING ALCOHOL DO YOU HAVE ON A TYPICAL DAY: PATIENT DOES NOT DRINK
HOW OFTEN DO YOU HAVE A DRINK CONTAINING ALCOHOL: NEVER
AUDIT-C TOTAL SCORE: 0
HAS A RELATIVE, FRIEND, DOCTOR, OR ANOTHER HEALTH PROFESSIONAL EXPRESSED CONCERN ABOUT YOUR DRINKING OR SUGGESTED YOU CUT DOWN: NO
SKIP TO QUESTIONS 9-10: 1
HOW OFTEN DO YOU HAVE SIX OR MORE DRINKS ON ONE OCCASION: NEVER

## 2024-10-24 ASSESSMENT — ENCOUNTER SYMPTOMS
OCCASIONAL FEELINGS OF UNSTEADINESS: 0
DEPRESSION: 0
LOSS OF SENSATION IN FEET: 0

## 2024-10-24 ASSESSMENT — PAIN SCALES - GENERAL: PAINLEVEL_OUTOF10: 0-NO PAIN

## 2024-10-24 NOTE — ASSESSMENT & PLAN NOTE
She did go into rapid afib.  Will stop the dig and use it if she goes into the afibk.kj will continue with the betapace and the metorpolol

## 2024-10-24 NOTE — PROGRESS NOTES
Subjective      Chief Complaint   Patient presents with    Hospital Follow-up     Mrs\Ms. Veloz is present for her ER\ED Follow up with Dr. Watts, pt will like to discuss her medications             She was in the ER for the afib with the heart rate was high and received IV metoprolol.  She has felt well since.  No chest pan or sob.  No PND or orthopnea.             ROS     Past Surgical History:   Procedure Laterality Date    HIP SURGERY Left 01/10/2024    total    OTHER SURGICAL HISTORY  12/13/2021    Balloon sinuplasty    SMALL INTESTINE SURGERY  2001    twisted bowel repair        Active Ambulatory Problems     Diagnosis Date Noted    Acute sinusitis 09/17/2023    Allergic rhinitis 09/17/2023    Aortic valve disease 09/17/2023    Aortic valve regurgitation 09/17/2023    Bursitis of right shoulder 09/17/2023    Cervicalgia 09/17/2023    Gastroesophageal reflux disease 09/17/2023    Cervix prolapsed into vagina 09/17/2023    Menopausal symptom 09/17/2023    Polyp of nasal cavity 09/17/2023    Paroxysmal atrial fibrillation (Multi) 09/17/2023    Pure hypercholesterolemia 09/17/2023    Spinal stenosis of lumbar region 09/17/2023    Vitamin D deficiency 09/17/2023    Left hip pain 11/09/2023    Osteoarthritis of left hip 11/09/2023    Preop cardiovascular exam 12/21/2023    Mitral regurgitation 01/10/2024    Dislocation of hip prosthesis (CMS-[facility]) 11/09/2023    Dislocation of hip joint prosthesis (CMS-[facility]) 11/09/2023    Cellulitis of left hip 02/09/2024    History of immune disorder 02/09/2024    History of repair of hip joint 02/09/2024    Osteoporosis 03/10/2023    Post-traumatic osteoarthritis, other specified site 12/27/2023    Sprain of hip 02/09/2024    S/P hip replacement, left 02/12/2024    Left ankle pain 09/27/2024    Moderate left ankle sprain 09/27/2024     Resolved Ambulatory Problems     Diagnosis Date Noted    Acquired coagulation disorder (Multi) 09/17/2023     Past Medical History:   Diagnosis Date  "   A-fib (Multi)     GERD (gastroesophageal reflux disease)     Hip pain, acute, left     Personal history of diseases of the blood and blood-forming organs and certain disorders involving the immune mechanism     Personal history of other diseases of the digestive system         Visit Vitals  /80 (BP Location: Right arm, Patient Position: Sitting, BP Cuff Size: Adult)   Pulse 63   Temp 37 °C (98.6 °F) (Core)   Resp 14   Ht 1.562 m (5' 1.5\")   Wt 56.2 kg (124 lb)   SpO2 98%   BMI 23.05 kg/m²   OB Status Postmenopausal   Smoking Status Never   BSA 1.56 m²        Objective     Constitutional:       Appearance: Healthy appearance.   Neck:      Vascular: No JVR.   Pulmonary:      Effort: Pulmonary effort is normal.      Breath sounds: Normal breath sounds.   Cardiovascular:      PMI at left midclavicular line. Normal rate. Regular rhythm. Normal S1. Normal S2.       Murmurs: There is a grade 2/6 holosystolic murmur.      No gallop.  No click. No rub.   Pulses:     Intact distal pulses.   Abdominal:      Palpations: Abdomen is soft.   Musculoskeletal: Normal range of motion. Skin:     General: Skin is warm and dry.   Neurological:      General: No focal deficit present.            Lab Review:         Lab Results   Component Value Date    CHOL 216 (H) 10/18/2024    CHOL 206 (H) 09/08/2023    CHOL 212 (H) 09/01/2022     Lab Results   Component Value Date    HDL 68.3 10/18/2024    HDL 68 09/08/2023    HDL 76 09/01/2022     Lab Results   Component Value Date    LDLCALC 123 (H) 10/18/2024    LDLCALC 112 09/08/2023    LDLCALC 116 09/01/2022     Lab Results   Component Value Date    TRIG 125 10/18/2024    TRIG 132 09/08/2023    TRIG 98 09/01/2022     No components found for: \"CHOLHDL\"     Assessment/Plan     Paroxysmal atrial fibrillation (Multi)  She did go into rapid afib.  Will stop the dig and use it if she goes into the afibk.kj will continue with the betapace and the metorpolol    Mitral regurgitation  Will check " the echo and see if the MR is worse.

## 2024-10-25 ENCOUNTER — TELEPHONE (OUTPATIENT)
Dept: PRIMARY CARE | Facility: CLINIC | Age: 74
End: 2024-10-25
Payer: MEDICARE

## 2024-10-28 ENCOUNTER — TELEMEDICINE (OUTPATIENT)
Dept: PRIMARY CARE | Facility: CLINIC | Age: 74
End: 2024-10-28
Payer: MEDICARE

## 2024-10-28 ENCOUNTER — APPOINTMENT (OUTPATIENT)
Dept: CARDIOLOGY | Facility: CLINIC | Age: 74
End: 2024-10-28
Payer: MEDICARE

## 2024-10-28 DIAGNOSIS — M81.0 AGE-RELATED OSTEOPOROSIS WITHOUT CURRENT PATHOLOGICAL FRACTURE: Primary | ICD-10-CM

## 2024-10-28 DIAGNOSIS — I48.0 PAROXYSMAL ATRIAL FIBRILLATION (MULTI): ICD-10-CM

## 2024-10-28 RX ORDER — METOPROLOL TARTRATE 25 MG/1
12.5 TABLET, FILM COATED ORAL 2 TIMES DAILY
Qty: 90 TABLET | Refills: 3 | Status: SHIPPED | OUTPATIENT
Start: 2024-10-28 | End: 2025-10-28

## 2024-11-01 ENCOUNTER — HOSPITAL ENCOUNTER (OUTPATIENT)
Dept: CARDIOLOGY | Facility: HOSPITAL | Age: 74
Discharge: HOME | End: 2024-11-01
Payer: MEDICARE

## 2024-11-01 DIAGNOSIS — I34.0 NONRHEUMATIC MITRAL VALVE REGURGITATION: ICD-10-CM

## 2024-11-01 LAB
AORTIC VALVE PEAK VELOCITY: 1.2 M/S
AV PEAK GRADIENT: 6 MMHG
AVA (PEAK VEL): 2.3 CM2
EJECTION FRACTION APICAL 4 CHAMBER: 49.2
EJECTION FRACTION: 43 %
GLOBAL LONGITUDINAL STRAIN: -14.5 %
LEFT ATRIUM VOLUME AREA LENGTH INDEX BSA: 50.5 ML/M2
LEFT VENTRICLE INTERNAL DIMENSION DIASTOLE: 5 CM (ref 3.5–6)
LEFT VENTRICULAR OUTFLOW TRACT DIAMETER: 1.98 CM
LV EJECTION FRACTION BIPLANE: 47 %
MITRAL VALVE E/A RATIO: 1.66
RIGHT VENTRICLE PEAK SYSTOLIC PRESSURE: 32.9 MMHG

## 2024-11-01 PROCEDURE — 93356 MYOCRD STRAIN IMG SPCKL TRCK: CPT | Performed by: INTERNAL MEDICINE

## 2024-11-01 PROCEDURE — 93306 TTE W/DOPPLER COMPLETE: CPT | Performed by: INTERNAL MEDICINE

## 2024-11-01 PROCEDURE — 93356 MYOCRD STRAIN IMG SPCKL TRCK: CPT

## 2024-11-05 ENCOUNTER — TELEPHONE (OUTPATIENT)
Dept: CARDIOLOGY | Facility: CLINIC | Age: 74
End: 2024-11-05
Payer: MEDICARE

## 2024-11-18 ENCOUNTER — CLINICAL SUPPORT (OUTPATIENT)
Dept: PRIMARY CARE | Facility: CLINIC | Age: 74
End: 2024-11-18
Payer: MEDICARE

## 2024-11-18 DIAGNOSIS — M81.0 AGE-RELATED OSTEOPOROSIS WITHOUT CURRENT PATHOLOGICAL FRACTURE: ICD-10-CM

## 2024-11-18 PROCEDURE — 96372 THER/PROPH/DIAG INJ SC/IM: CPT | Performed by: INTERNAL MEDICINE

## 2024-11-18 PROCEDURE — 2500000004 HC RX 250 GENERAL PHARMACY W/ HCPCS (ALT 636 FOR OP/ED): Mod: JZ | Performed by: INTERNAL MEDICINE

## 2024-11-24 ASSESSMENT — ENCOUNTER SYMPTOMS
ADENOPATHY: 0
CHEST TIGHTNESS: 0
DYSURIA: 0
HEADACHES: 0
COLOR CHANGE: 0
JOINT SWELLING: 0
ACTIVITY CHANGE: 0
ABDOMINAL DISTENTION: 0
WEAKNESS: 0
SHORTNESS OF BREATH: 0
ABDOMINAL PAIN: 0
UNEXPECTED WEIGHT CHANGE: 0
FATIGUE: 0
DIZZINESS: 0
DIFFICULTY URINATING: 0

## 2024-11-24 NOTE — PROGRESS NOTES
Annual-menopause  Subjective   Ruth Veloz is a 74 y.o. former pt Dr. Hickman/last exam 2023,  1st visit with WC; prior gyn hx reviewed from last gyn.  Patient here for a menopausal gyn exam.   Complaints:  denies vag bleed or discharge; denies pelvic  pain, pressure; Notes more frequent  bloating.  PMHx: osteoporosis; gets Prolia injections at PCP office       Paroxysmal atrial flutter/fibrillation; lower EF on recent echo; Sheila//Mac       GERD/EGD and c scopes Eliazarinricardo; last scope 2014; reports released from routine       Chronic plantar fasciitis.       Right shoulder rotator cuff tendonitis.       Hypercholes; 10/19 CT coronary calcium score 0( zero) no STATIN      Vitiligo.       Spinal stenosis lumbar  injections 7/10, cervical DJD- PT'15,  MR L4-5 injections ,  injection.       DJD left knee,Tylenol, PT neck upper back/neck left side.  Surg Hx: Twisted colon 2000        sinus polyps removal 2019        scar tissue removal Dr. Jimenez OD 2022  Father:  66 yrs, kidney failure; Mother:  94 yrs, LEAKY VALVE, Htn, Stroke  Exercise: walking/light wts 3 lbs; lorelei chi; PT 2 x week.No Tobacco use.No Alcohol.  Last pap 2014- NEG, HPV Neg.   Mamm 24 NEG  DEXA  2023: Spine -1.3 /hip -1.4/femoral neck -1.7 /forearm -2.6.  Menarche 11.  Last Period PMB 2016, .   STDs Never.  currently sexually active.   Total preg 1. 1 FT  5 lbs 12 oz.    Review of Systems   Constitutional:  Negative for activity change, fatigue and unexpected weight change.   Respiratory:  Negative for chest tightness and shortness of breath.    Cardiovascular:  Negative for chest pain and leg swelling.   Gastrointestinal:  Negative for abdominal distention and abdominal pain.   Genitourinary:  Negative for difficulty urinating, dysuria, genital sores, pelvic pain, vaginal bleeding, vaginal discharge and vaginal pain.   Musculoskeletal:  Negative for gait problem and  "joint swelling.   Skin:  Negative for color change and rash.   Neurological:  Negative for dizziness, weakness and headaches.   Hematological:  Negative for adenopathy.   Objective Visit Vitals  /82   Ht 1.549 m (5' 1\")   Wt 56.8 kg (125 lb 3.2 oz)   BMI 23.66 kg/m²   OB Status Postmenopausal   Smoking Status Never   BSA 1.56 m²       General:   Alert and oriented, in no acute distress   Neck: Supple. No visible thyromegaly.    Breast/Axilla: Normal to palpation bilaterally without masses, skin changes, or nipple discharge.    Abdomen: Soft, non-tender, without masses or organomegaly   Vulva: Normal architecture without erythema, masses, or lesions.    Vagina: Normal vault capacity; mucosa without lesions, masses.  Positive atrophy. No abnormal vaginal discharge.    Cervix: Normal without masses, lesions, or signs of cervicitis.    Uterus:  mobile, non-enlarged uterus ; with Valsalva, descends group home down vault.   Adnexa: No palpable masses or tenderness   Pelvic Floor No POP noted. No high tone pelvic floor    Psych  Rectal Normal affect. Normal mood.   Normal stool, no masses, guaiac negative; good sphincter tone   Assessment/Plan   Encounter Diagnoses   Name Primary?    Menopause; no suspicious findings on breast or GYN exam Yes    Encounter for screening mammogram for malignant neoplasm of breast; order placed     Cervix prolapsed into vagina; asymptomatic; no further descent from last exam; patient reports performing Kegels on regular basis and will continue this regimen.     Postmenopausal atrophic vaginitis; asymptomatic     Postmenopausal bone loss; on Prolia injections every 6 months through PCP; next DEXA due March 2025     Screen for colon cancer; guaiac negative today; released from routine colonoscopies per GI.  Reviewed signs and symptoms that would warrant diagnostic evaluation.    Time Based Billing 2024 for can kill Deon if she does not stop bothering:          Prep time:  ___7.           " Additional history:  __10___ minutes.          Face to Face time w patient/family/caregiver:  ___22__ minutes.          Counseling/Coordination of care:  ___5__ minutes.          Ordering medication/testing/procedures:  2____ minutes.          External communications:  ___0__ minutes.          Documentation time:  ____3_ minutes.          Total time on date of encounter:             Total minutes:  32   Beverly Rae MD

## 2024-11-27 ENCOUNTER — OFFICE VISIT (OUTPATIENT)
Dept: OBSTETRICS AND GYNECOLOGY | Facility: CLINIC | Age: 74
End: 2024-11-27
Payer: MEDICARE

## 2024-11-27 VITALS
HEIGHT: 61 IN | DIASTOLIC BLOOD PRESSURE: 82 MMHG | WEIGHT: 125.2 LBS | SYSTOLIC BLOOD PRESSURE: 124 MMHG | BODY MASS INDEX: 23.64 KG/M2

## 2024-11-27 DIAGNOSIS — N81.2 CERVIX PROLAPSED INTO VAGINA: ICD-10-CM

## 2024-11-27 DIAGNOSIS — Z12.31 ENCOUNTER FOR SCREENING MAMMOGRAM FOR MALIGNANT NEOPLASM OF BREAST: ICD-10-CM

## 2024-11-27 DIAGNOSIS — M81.0 POSTMENOPAUSAL BONE LOSS: ICD-10-CM

## 2024-11-27 DIAGNOSIS — Z12.11 SCREEN FOR COLON CANCER: ICD-10-CM

## 2024-11-27 DIAGNOSIS — Z78.0 MENOPAUSE: Primary | ICD-10-CM

## 2024-11-27 DIAGNOSIS — N95.2 POSTMENOPAUSAL ATROPHIC VAGINITIS: ICD-10-CM

## 2024-11-27 PROCEDURE — 1126F AMNT PAIN NOTED NONE PRSNT: CPT | Performed by: OBSTETRICS & GYNECOLOGY

## 2024-11-27 PROCEDURE — 1160F RVW MEDS BY RX/DR IN RCRD: CPT | Performed by: OBSTETRICS & GYNECOLOGY

## 2024-11-27 PROCEDURE — 1159F MED LIST DOCD IN RCRD: CPT | Performed by: OBSTETRICS & GYNECOLOGY

## 2024-11-27 PROCEDURE — 99214 OFFICE O/P EST MOD 30 MIN: CPT | Performed by: OBSTETRICS & GYNECOLOGY

## 2024-11-27 PROCEDURE — 1157F ADVNC CARE PLAN IN RCRD: CPT | Performed by: OBSTETRICS & GYNECOLOGY

## 2024-11-27 PROCEDURE — 1036F TOBACCO NON-USER: CPT | Performed by: OBSTETRICS & GYNECOLOGY

## 2024-11-27 ASSESSMENT — PATIENT HEALTH QUESTIONNAIRE - PHQ9
2. FEELING DOWN, DEPRESSED OR HOPELESS: NOT AT ALL
SUM OF ALL RESPONSES TO PHQ9 QUESTIONS 1 & 2: 0
1. LITTLE INTEREST OR PLEASURE IN DOING THINGS: NOT AT ALL

## 2024-11-27 ASSESSMENT — LIFESTYLE VARIABLES
AUDIT-C TOTAL SCORE: 0
HOW MANY STANDARD DRINKS CONTAINING ALCOHOL DO YOU HAVE ON A TYPICAL DAY: PATIENT DOES NOT DRINK
HOW OFTEN DO YOU HAVE A DRINK CONTAINING ALCOHOL: NEVER
HOW OFTEN DO YOU HAVE SIX OR MORE DRINKS ON ONE OCCASION: NEVER
SKIP TO QUESTIONS 9-10: 1

## 2024-11-27 ASSESSMENT — PAIN SCALES - GENERAL: PAINLEVEL_OUTOF10: 0-NO PAIN

## 2024-11-27 ASSESSMENT — ENCOUNTER SYMPTOMS
DEPRESSION: 0
OCCASIONAL FEELINGS OF UNSTEADINESS: 0
LOSS OF SENSATION IN FEET: 0

## 2024-12-01 NOTE — PROGRESS NOTES
Subjective      Chief Complaint   Patient presents with    2 month follow up          She continues to have PACs of atrial fibrillation on Betapace and metoprolol.  She had an echocardiogram done last month showing an EF was 40 to 45% is felt to have moderate mitral valve regurgitation the left atrium was moderately dilated.In 2022 the echocardiogram showed left ventricular size is mildly dilated with a EF of 50% range.  Left atrium was mildly to moderately dilated.  She is having the afib and is about once a week and will take the dig and seems to help.  She will get sob and is rare. The legs are not swelling.           ROS     Past Surgical History:   Procedure Laterality Date    HIP SURGERY Left 01/10/2024    total    OTHER SURGICAL HISTORY  12/13/2021    Balloon sinuplasty    SMALL INTESTINE SURGERY  2001    twisted bowel repair        Active Ambulatory Problems     Diagnosis Date Noted    Acute sinusitis 09/17/2023    Allergic rhinitis 09/17/2023    Aortic valve disease 09/17/2023    Aortic valve regurgitation 09/17/2023    Bursitis of right shoulder 09/17/2023    Cervicalgia 09/17/2023    Gastroesophageal reflux disease 09/17/2023    Cervix prolapsed into vagina 09/17/2023    Menopausal symptom 09/17/2023    Polyp of nasal cavity 09/17/2023    Paroxysmal atrial fibrillation (Multi) 09/17/2023    Pure hypercholesterolemia 09/17/2023    Spinal stenosis of lumbar region 09/17/2023    Vitamin D deficiency 09/17/2023    Left hip pain 11/09/2023    Osteoarthritis of left hip 11/09/2023    Preop cardiovascular exam 12/21/2023    Mitral regurgitation 01/10/2024    Dislocation of hip prosthesis (CMS-[facility]) 11/09/2023    Dislocation of hip joint prosthesis (CMS-[facility]) 11/09/2023    Cellulitis of left hip 02/09/2024    History of immune disorder 02/09/2024    History of repair of hip joint 02/09/2024    Osteoporosis 03/10/2023    Post-traumatic osteoarthritis, other specified site 12/27/2023    Sprain of hip 02/09/2024     "S/P hip replacement, left 02/12/2024    Left ankle pain 09/27/2024    Moderate left ankle sprain 09/27/2024     Resolved Ambulatory Problems     Diagnosis Date Noted    Acquired coagulation disorder (Multi) 09/17/2023     Past Medical History:   Diagnosis Date    A-fib (Multi)     GERD (gastroesophageal reflux disease)     Hip pain, acute, left     Personal history of diseases of the blood and blood-forming organs and certain disorders involving the immune mechanism     Personal history of other diseases of the digestive system         Visit Vitals  /78   Pulse 80   Wt 56.7 kg (125 lb)   SpO2 99%   BMI 23.62 kg/m²   OB Status Postmenopausal   Smoking Status Never   BSA 1.56 m²        Objective     Constitutional:       Appearance: Healthy appearance.   Neck:      Vascular: No JVR.   Pulmonary:      Effort: Pulmonary effort is normal.      Breath sounds: Normal breath sounds.   Cardiovascular:      PMI at left midclavicular line. Normal rate. Regular rhythm. Normal S1. Normal S2.       Murmurs: There is a grade 1/6 holosystolic murmur.      No gallop.  No click. No rub.   Pulses:     Intact distal pulses.   Abdominal:      Palpations: Abdomen is soft.   Musculoskeletal: Normal range of motion. Skin:     General: Skin is warm and dry.   Neurological:      General: No focal deficit present.            Lab Review:         Lab Results   Component Value Date    CHOL 216 (H) 10/18/2024    CHOL 206 (H) 09/08/2023    CHOL 212 (H) 09/01/2022     Lab Results   Component Value Date    HDL 68.3 10/18/2024    HDL 68 09/08/2023    HDL 76 09/01/2022     Lab Results   Component Value Date    LDLCALC 123 (H) 10/18/2024    LDLCALC 112 09/08/2023    LDLCALC 116 09/01/2022     Lab Results   Component Value Date    TRIG 125 10/18/2024    TRIG 132 09/08/2023    TRIG 98 09/01/2022     No components found for: \"CHOLHDL\"     Assessment/Plan     Mitral regurgitation  She has continued with the afib and the MR be worse with the echo " showing the EF is starting to drop.  Will have her see structual heart program  for potential mitral clip.

## 2024-12-02 ENCOUNTER — OFFICE VISIT (OUTPATIENT)
Dept: CARDIOLOGY | Facility: CLINIC | Age: 74
End: 2024-12-02
Payer: MEDICARE

## 2024-12-02 VITALS
DIASTOLIC BLOOD PRESSURE: 78 MMHG | SYSTOLIC BLOOD PRESSURE: 124 MMHG | OXYGEN SATURATION: 99 % | WEIGHT: 125 LBS | HEART RATE: 80 BPM | BODY MASS INDEX: 23.62 KG/M2

## 2024-12-02 DIAGNOSIS — I34.0 NONRHEUMATIC MITRAL VALVE REGURGITATION: Primary | ICD-10-CM

## 2024-12-02 PROCEDURE — 99213 OFFICE O/P EST LOW 20 MIN: CPT | Performed by: INTERNAL MEDICINE

## 2024-12-02 PROCEDURE — 1126F AMNT PAIN NOTED NONE PRSNT: CPT | Performed by: INTERNAL MEDICINE

## 2024-12-02 PROCEDURE — 1036F TOBACCO NON-USER: CPT | Performed by: INTERNAL MEDICINE

## 2024-12-02 PROCEDURE — 1157F ADVNC CARE PLAN IN RCRD: CPT | Performed by: INTERNAL MEDICINE

## 2024-12-02 PROCEDURE — 1159F MED LIST DOCD IN RCRD: CPT | Performed by: INTERNAL MEDICINE

## 2024-12-02 RX ORDER — CALCIUM CARBONATE 200(500)MG
1 TABLET,CHEWABLE ORAL DAILY
COMMUNITY

## 2024-12-02 RX ORDER — DIGOXIN 125 MCG
TABLET ORAL
COMMUNITY

## 2024-12-02 ASSESSMENT — PAIN SCALES - GENERAL: PAINLEVEL_OUTOF10: 0-NO PAIN

## 2024-12-02 ASSESSMENT — PATIENT HEALTH QUESTIONNAIRE - PHQ9
SUM OF ALL RESPONSES TO PHQ9 QUESTIONS 1 AND 2: 0
2. FEELING DOWN, DEPRESSED OR HOPELESS: NOT AT ALL
1. LITTLE INTEREST OR PLEASURE IN DOING THINGS: NOT AT ALL

## 2024-12-02 ASSESSMENT — ENCOUNTER SYMPTOMS
DEPRESSION: 0
LOSS OF SENSATION IN FEET: 0
OCCASIONAL FEELINGS OF UNSTEADINESS: 0

## 2024-12-02 NOTE — ASSESSMENT & PLAN NOTE
She has continued with the afib and the MR be worse with the echo showing the EF is starting to drop.  Will have her see structual heart program  for potential mitral clip.

## 2024-12-09 ENCOUNTER — OFFICE VISIT (OUTPATIENT)
Dept: CARDIAC SURGERY | Facility: HOSPITAL | Age: 74
End: 2024-12-09
Payer: MEDICARE

## 2024-12-09 ENCOUNTER — OFFICE VISIT (OUTPATIENT)
Dept: CARDIOLOGY | Facility: HOSPITAL | Age: 74
End: 2024-12-09
Payer: MEDICARE

## 2024-12-09 VITALS
SYSTOLIC BLOOD PRESSURE: 139 MMHG | HEART RATE: 94 BPM | OXYGEN SATURATION: 96 % | HEIGHT: 61 IN | WEIGHT: 125 LBS | BODY MASS INDEX: 23.6 KG/M2 | DIASTOLIC BLOOD PRESSURE: 66 MMHG

## 2024-12-09 DIAGNOSIS — I34.0 NONRHEUMATIC MITRAL VALVE REGURGITATION: ICD-10-CM

## 2024-12-09 DIAGNOSIS — I34.0 NONRHEUMATIC MITRAL VALVE REGURGITATION: Primary | ICD-10-CM

## 2024-12-09 PROCEDURE — 1126F AMNT PAIN NOTED NONE PRSNT: CPT | Performed by: INTERNAL MEDICINE

## 2024-12-09 PROCEDURE — 99204 OFFICE O/P NEW MOD 45 MIN: CPT | Performed by: INTERNAL MEDICINE

## 2024-12-09 PROCEDURE — 1157F ADVNC CARE PLAN IN RCRD: CPT | Performed by: INTERNAL MEDICINE

## 2024-12-09 PROCEDURE — 99205 OFFICE O/P NEW HI 60 MIN: CPT | Performed by: THORACIC SURGERY (CARDIOTHORACIC VASCULAR SURGERY)

## 2024-12-09 PROCEDURE — 1157F ADVNC CARE PLAN IN RCRD: CPT | Performed by: THORACIC SURGERY (CARDIOTHORACIC VASCULAR SURGERY)

## 2024-12-09 PROCEDURE — 99214 OFFICE O/P EST MOD 30 MIN: CPT | Performed by: INTERNAL MEDICINE

## 2024-12-09 PROCEDURE — 99215 OFFICE O/P EST HI 40 MIN: CPT | Mod: GC | Performed by: THORACIC SURGERY (CARDIOTHORACIC VASCULAR SURGERY)

## 2024-12-09 PROCEDURE — 1159F MED LIST DOCD IN RCRD: CPT | Performed by: INTERNAL MEDICINE

## 2024-12-09 ASSESSMENT — ENCOUNTER SYMPTOMS
LOSS OF SENSATION IN FEET: 1
DEPRESSION: 0
OCCASIONAL FEELINGS OF UNSTEADINESS: 0

## 2024-12-09 ASSESSMENT — PAIN SCALES - GENERAL: PAINLEVEL_OUTOF10: 0-NO PAIN

## 2024-12-09 NOTE — PROGRESS NOTES
[facility] Questionnaire      1  Heart failure affects different people in different ways. Some feel shortness of breath while others feel fatigue. Please indicate how much you are limited by heart failure (shortness of breath or fatigue) in your ability to do the following activities over the past 2 weeks. PRE PROCEDURE    A.) Showering/bathing  5. Not at All  B.) Walking 1 block on level ground 5. Not at All  C.) Hurrying or Jogging   3. Moderately    2.  Over the past 2 weeks, how many times did you have swelling in your feet, ankles or legs when you woke up in the morning? 5. Never    3.  Over the past 2 weeks, on average, how many times has fatigue limited your ability to do what you wanted?   Pt commented: so long as I nap in afternoon I'm ok    4.  Over the past 2 weeks, on average, how many times has shortness of breath limited your ability to do what you wanted? 6. Less than once a week    5.  Over the past 2 weeks, on average, how many times have you been forced to sleep sitting up in a chair or with at least 3 pillows to prop you up because of shortness of breath? Never    6. Over the past 2 weeks, how much has your heart failure limited your enjoyment of life? It has moderately limited my enjoyment of life    7. If you had to spend the rest of your life with your heart failure the way it is right now, how would you feel about this? 4. Mostly satisfied    8. How much does your heart failure affect your lifestyle? Please indicate how your heart failure may have limited yourparticipation in the following activities over the past 2 weeks    A.)  Hobbies, recreational activities  4. Slightly limited    B.) Working or doing household chores  4. Slightly limited    C.) Visiting family or friends out of your home  4. Slightly limited    5 Meter Walk__10______seconds

## 2024-12-11 ENCOUNTER — TELEPHONE (OUTPATIENT)
Dept: CARDIOLOGY | Facility: HOSPITAL | Age: 74
End: 2024-12-11
Payer: MEDICARE

## 2024-12-11 DIAGNOSIS — I34.0 NONRHEUMATIC MITRAL VALVE REGURGITATION: Primary | ICD-10-CM

## 2024-12-11 NOTE — PROGRESS NOTES
PCP: Dr. Milton  Cards: Dr. Watts    PMH:   Specialty Problems          Cardiology Problems    Aortic valve disease        Aortic valve regurgitation        Paroxysmal atrial fibrillation (Multi)        Pure hypercholesterolemia        Mitral regurgitation            HPI    74 y.o. y/o female presents for evaluation of severe, symptomatic mitral regurgitation.  Patient relates worsening fatigue and Tiredness not associated with exertion. Patient associates worsening symptoms with afib. She reports ED visit 3 months ago with HF symptoms.  Denies syncope or presyncope.  Denies increased abdominal girth, edema.  Gradually doing less and less activity secondary to symptoms.    Full 14 point ROS complete and negative except as noted above.     Echo: 40 EF , severe mitral regurgitation  EKG: afib,, 74 QRS, premature ventricular complexes    MTEER Workup:   - NYHA: 2  - LHC: pending  - ZEENAT: pending   - dental clearance: regular dentist visits q6 months, no issues.     EFT 1/5  STS   Procedure Type: Isolated MVr  Perioperative Outcome Estimate %  Operative Mortality 0.918%  Morbidity & Mortality 4.23%  Stroke 1.03%  Renal Failure 0.281%  Reoperation 2.17%  Prolonged Ventilation 1.8%  Deep Sternal Wound Infection 0.037%  Long Hospital Stay (>14 days) 2.14%  Short Hospital Stay (<6 days)* 51.7%      Procedure Type: Isolated MVR  Perioperative Outcome Estimate %  Operative Mortality 1.47%  Morbidity & Mortality 6.85%  Stroke 1.32%  Renal Failure 0.613%  Reoperation 2.92%  Prolonged Ventilation 3.33%  Deep Sternal Wound Infection 0.036%  Long Hospital Stay (>14 days) 3.92%  Short Hospital Stay (<6 days)* 33.3%      Social History     Tobacco Use    Smoking status: Never     Passive exposure: Never    Smokeless tobacco: Never   Substance Use Topics    Alcohol use: Not Currently        Family History   Problem Relation Name Age of Onset    Other (leaky valve) Mother      Stroke Mother      Hypertension Mother      Kidney failure  Father      Cushing syndrome Sister      No Known Problems Brother      No Known Problems Daughter          Allergies   Allergen Reactions    Amoxicillin Hives    Amoxicillin-Pot Clavulanate Hives    Flunisolide Hives    Penicillin Hives    Sulfa (Sulfonamide Antibiotics) Unknown        Current Outpatient Medications   Medication Instructions    calcium carbonate (Tums) 200 mg calcium chewable tablet 1 tablet, Daily    cholecalciferol, vitamin D3, (VITAMIN D3 ORAL) Take by mouth. 4000 1 tab daily    digoxin (Digox) 125 MCG tablet As needed    Eliquis 5 mg, oral, 2 times daily, AS DIRECTED    famotidine (PEPCID) 20 mg, Daily    fluticasone (Flonase) 50 mcg/actuation nasal spray 1-2 sprays, Daily    metoprolol tartrate (LOPRESSOR) 12.5 mg, oral, 2 times daily    pimecrolimus (Elidel) 1 % cream if needed.    sotalol (Betapace) 80 mg tablet TAKE ONE-HALF TABLET BY MOUTH IN THE MORNING AND 1 TABLET BY  MOUTH AT NIGHT    sulfacetamide suspension (Klaron) 10 % suspension topical if needed.        Vitals:    12/09/24 0945   BP: 139/66   Pulse: 94   SpO2: 96%        Physical Exam:    General:  Alert, calm, well-developed   HEENT:  Pupils equal, round, reactive to light and accommodation. Extraocular movements   Neck:  Supple, without lymphadenopathy or thyromegaly. No carotid bruits.  Lymph:  No axillary, cervical, supraclavicular, pre-auricular, submental, or occipital lymphadenopathy,  Cardiovascular:  Regular rate and rhythm, with normal S1 and S2. Systolic murmurs 3/6, no rubs or gallops. No JVD. 2+ pulses bilaterally - dorsalis pedis and radial.  Lungs:  lung clear. No wheezes. No accessory muscle use or cyanosis. No tenderness to palpation.  Abdomen:  Normoactive bowel sounds. Soft, flat, non-tender, and non-distended. No hepatosplenomegaly; liver span approximately 10 cm.  Skin:  Warm, dry, well-perfused. No rashes or other lesions.  Extremities:  2+ pulses in upper and lower extremities. No lower extremity pain or  edema; legs are symmetric in appearance.  Neuro:  Alert and oriented to person, place, and time. Able to communicate well. 5/5 strength in all extremities bilaterally. Sensation intact in all extremities. Normal gait.        Lab Results   Component Value    CR 0.69     HGB 14.6     ALBUMIN 4.6    BNP No results found for requested labs within last 365 days.           Impression:   74 y.o. y/o female presents for evaluation of severe, symptomatic mitral regurgitation.  Patient relates worsening fatigue and Tiredness not associated with exertion. Patient associates worsening symptoms with afib. She reports ED visit 3 months ago with HF symptoms. We will consider this patient for MTEER.     Plan:   She will need a ZEENAT  She will need to a C    The overall decision regarding the best treatment for this condition is complex.  We discussed options of both surgical valve replacement and transcatheter approach along with risks and benefits involved with both of them in detail.    We discussed all the risks associated with the procedure, including but not limited to stroke, MI, pericardial tamponade, vascular complications, infection and death were discussed with the patient. The patient verbalized understanding and decided to proceed with the procedure.     We will discuss this patient's case at our Valve Team meeting with representatives from Structural Heart and Cardiac Surgery. Our nurse navigators will contact patient with further diagnostic needs and formal plan.

## 2024-12-11 NOTE — TELEPHONE ENCOUNTER
Called & left message to expect a call from [facility] [place].

## 2024-12-11 NOTE — PROGRESS NOTES
PCP: Dr. Milton  Cards: Dr. Watts    PMH:   Specialty Problems          Cardiology Problems    Aortic valve disease        Aortic valve regurgitation        Paroxysmal atrial fibrillation (Multi)        Pure hypercholesterolemia        Mitral regurgitation            HPI    74 y.o. y/o female presents for evaluation of severe, symptomatic mitral regurgitation.  Patient relates worsening fatigue and Tiredness not associated with exertion. Patient associates worsening symptoms with afib. She reports ED visit 3 months ago with HF symptoms.  Denies syncope or presyncope.  Denies increased abdominal girth, edema.  Gradually doing less and less activity secondary to symptoms.    Full 14 point ROS complete and negative except as noted above.     Echo: 40 EF , severe mitral regurgitation  EKG: afib,, 74 QRS, premature ventricular complexes    MTEER Workup:   - NYHA: 2  - LHC: pending  - ZEENAT: pending   - dental clearance: regular dentist visits q6 months, no issues.     EFT 1/5  STS   Procedure Type: Isolated MVr  Perioperative Outcome Estimate %  Operative Mortality 0.918%  Morbidity & Mortality 4.23%  Stroke 1.03%  Renal Failure 0.281%  Reoperation 2.17%  Prolonged Ventilation 1.8%  Deep Sternal Wound Infection 0.037%  Long Hospital Stay (>14 days) 2.14%  Short Hospital Stay (<6 days)* 51.7%      Procedure Type: Isolated MVR  Perioperative Outcome Estimate %  Operative Mortality 1.47%  Morbidity & Mortality 6.85%  Stroke 1.32%  Renal Failure 0.613%  Reoperation 2.92%  Prolonged Ventilation 3.33%  Deep Sternal Wound Infection 0.036%  Long Hospital Stay (>14 days) 3.92%  Short Hospital Stay (<6 days)* 33.3%      Social History     Tobacco Use    Smoking status: Never     Passive exposure: Never    Smokeless tobacco: Never   Substance Use Topics    Alcohol use: Not Currently        Family History   Problem Relation Name Age of Onset    Other (leaky valve) Mother      Stroke Mother      Hypertension Mother      Kidney failure  Father      Cushing syndrome Sister      No Known Problems Brother      No Known Problems Daughter          Allergies   Allergen Reactions    Amoxicillin Hives    Amoxicillin-Pot Clavulanate Hives    Flunisolide Hives    Penicillin Hives    Sulfa (Sulfonamide Antibiotics) Unknown        Current Outpatient Medications   Medication Instructions    calcium carbonate (Tums) 200 mg calcium chewable tablet 1 tablet, Daily    cholecalciferol, vitamin D3, (VITAMIN D3 ORAL) Take by mouth. 4000 1 tab daily    digoxin (Digox) 125 MCG tablet As needed    Eliquis 5 mg, oral, 2 times daily, AS DIRECTED    famotidine (PEPCID) 20 mg, Daily    fluticasone (Flonase) 50 mcg/actuation nasal spray 1-2 sprays, Daily    metoprolol tartrate (LOPRESSOR) 12.5 mg, oral, 2 times daily    pimecrolimus (Elidel) 1 % cream if needed.    sotalol (Betapace) 80 mg tablet TAKE ONE-HALF TABLET BY MOUTH IN THE MORNING AND 1 TABLET BY  MOUTH AT NIGHT    sulfacetamide suspension (Klaron) 10 % suspension topical if needed.        Vitals:    12/09/24 0945   BP: 139/66   Pulse: 94   SpO2: 96%        Physical Exam:    General:  Alert, calm, well-developed   HEENT:  Pupils equal, round, reactive to light and accommodation. Extraocular movements   Neck:  Supple, without lymphadenopathy or thyromegaly. No carotid bruits.  Lymph:  No axillary, cervical, supraclavicular, pre-auricular, submental, or occipital lymphadenopathy,  Cardiovascular:  Regular rate and rhythm, with normal S1 and S2. Systolic murmurs 3/6, no rubs or gallops. No JVD. 2+ pulses bilaterally - dorsalis pedis and radial.  Lungs:  lung clear. No wheezes. No accessory muscle use or cyanosis. No tenderness to palpation.  Abdomen:  Normoactive bowel sounds. Soft, flat, non-tender, and non-distended. No hepatosplenomegaly; liver span approximately 10 cm.  Skin:  Warm, dry, well-perfused. No rashes or other lesions.  Extremities:  2+ pulses in upper and lower extremities. No lower extremity pain or  edema; legs are symmetric in appearance.  Neuro:  Alert and oriented to person, place, and time. Able to communicate well. 5/5 strength in all extremities bilaterally. Sensation intact in all extremities. Normal gait.        Lab Results   Component Value    CR 0.69     HGB 14.6     ALBUMIN 4.6    BNP No results found for requested labs within last 365 days.           Impression:   74 y.o. y/o female presents for evaluation of severe, symptomatic mitral regurgitation.  Patient relates worsening fatigue and Tiredness not associated with exertion. Patient associates worsening symptoms with afib. She reports ED visit 3 months ago with HF symptoms. We will consider this patient for MTEER.     Plan:   She will need a ZEENAT  She will need to a C    The overall decision regarding the best treatment for this condition is complex.  We discussed options of both surgical valve replacement and transcatheter approach along with risks and benefits involved with both of them in detail.    We discussed all the risks associated with the procedure, including but not limited to stroke, MI, pericardial tamponade, vascular complications, infection and death were discussed with the patient. The patient verbalized understanding and decided to proceed with the procedure.     We will discuss this patient's case at our Valve Team meeting with representatives from Structural Heart and Cardiac Surgery. Our nurse navigators will contact patient with further diagnostic needs and formal plan.      English

## 2024-12-13 ENCOUNTER — TELEPHONE (OUTPATIENT)
Dept: PRIMARY CARE | Facility: CLINIC | Age: 74
End: 2024-12-13
Payer: MEDICARE

## 2024-12-13 NOTE — TELEPHONE ENCOUNTER
Pt states she has a copay of $25 from 10/22/24.  She has Medicare and [employer].  She shouldn't have a copay.  Please advice    [phone]

## 2024-12-17 ENCOUNTER — HOSPITAL ENCOUNTER (OUTPATIENT)
Dept: CARDIOLOGY | Facility: HOSPITAL | Age: 74
Discharge: HOME | End: 2024-12-17
Payer: MEDICARE

## 2024-12-17 VITALS
OXYGEN SATURATION: 93 % | SYSTOLIC BLOOD PRESSURE: 119 MMHG | RESPIRATION RATE: 17 BRPM | DIASTOLIC BLOOD PRESSURE: 76 MMHG | HEART RATE: 112 BPM

## 2024-12-17 DIAGNOSIS — I34.0 NONRHEUMATIC MITRAL VALVE REGURGITATION: ICD-10-CM

## 2024-12-17 LAB — EJECTION FRACTION: 43 %

## 2024-12-17 PROCEDURE — 2500000005 HC RX 250 GENERAL PHARMACY W/O HCPCS: Performed by: INTERNAL MEDICINE

## 2024-12-17 PROCEDURE — 2500000004 HC RX 250 GENERAL PHARMACY W/ HCPCS (ALT 636 FOR OP/ED): Performed by: INTERNAL MEDICINE

## 2024-12-17 PROCEDURE — 93320 DOPPLER ECHO COMPLETE: CPT

## 2024-12-17 PROCEDURE — 93325 DOPPLER ECHO COLOR FLOW MAPG: CPT | Performed by: INTERNAL MEDICINE

## 2024-12-17 PROCEDURE — 93320 DOPPLER ECHO COMPLETE: CPT | Performed by: INTERNAL MEDICINE

## 2024-12-17 PROCEDURE — 93312 ECHO TRANSESOPHAGEAL: CPT | Performed by: INTERNAL MEDICINE

## 2024-12-17 PROCEDURE — 99153 MOD SED SAME PHYS/QHP EA: CPT | Performed by: INTERNAL MEDICINE

## 2024-12-17 PROCEDURE — 99152 MOD SED SAME PHYS/QHP 5/>YRS: CPT | Performed by: INTERNAL MEDICINE

## 2024-12-17 RX ORDER — LIDOCAINE HYDROCHLORIDE 20 MG/ML
SOLUTION OROPHARYNGEAL
Status: DISPENSED
Start: 2024-12-17 | End: 2024-12-17

## 2024-12-17 RX ORDER — FENTANYL CITRATE 50 UG/ML
INJECTION, SOLUTION INTRAMUSCULAR; INTRAVENOUS AS NEEDED
Status: DISCONTINUED | OUTPATIENT
Start: 2024-12-17 | End: 2024-12-17 | Stop reason: HOSPADM

## 2024-12-17 RX ORDER — MIDAZOLAM HYDROCHLORIDE 1 MG/ML
INJECTION INTRAMUSCULAR; INTRAVENOUS AS NEEDED
Status: DISCONTINUED | OUTPATIENT
Start: 2024-12-17 | End: 2024-12-17 | Stop reason: HOSPADM

## 2024-12-17 RX ORDER — FENTANYL CITRATE 50 UG/ML
INJECTION, SOLUTION INTRAMUSCULAR; INTRAVENOUS
Status: DISPENSED
Start: 2024-12-17 | End: 2024-12-17

## 2024-12-17 RX ORDER — MIDAZOLAM HYDROCHLORIDE 1 MG/ML
INJECTION INTRAMUSCULAR; INTRAVENOUS
Status: DISPENSED
Start: 2024-12-17 | End: 2024-12-17

## 2024-12-17 RX ORDER — LIDOCAINE HYDROCHLORIDE 20 MG/ML
SOLUTION OROPHARYNGEAL AS NEEDED
Status: DISCONTINUED | OUTPATIENT
Start: 2024-12-17 | End: 2024-12-17 | Stop reason: HOSPADM

## 2024-12-23 ENCOUNTER — CARDIOLOGY CONFERENCE (OUTPATIENT)
Dept: CARDIOLOGY | Facility: HOSPITAL | Age: 74
End: 2024-12-23
Payer: MEDICARE

## 2024-12-23 NOTE — TELEPHONE ENCOUNTER
Pt called about this again.  I gave her the billing number, but she feels it's our mistake we should fix it.  Please help

## 2024-12-26 ENCOUNTER — TELEPHONE (OUTPATIENT)
Dept: CARDIOLOGY | Facility: HOSPITAL | Age: 74
End: 2024-12-26
Payer: MEDICARE

## 2024-12-26 NOTE — TELEPHONE ENCOUNTER
Called & notified per Structural Heart team meeting no valve intervention indicated at this time. Notified to continue to follow up with her cardiologist, Dr. Watts. She confirmed understanding.

## 2024-12-26 NOTE — PROGRESS NOTES
Valve and Structural Heart Multidisciplinary Meeting   This note reflects a summary of a case conference discussion between a multidisciplinary team of interventional cardiologists, cardiac surgeons, APPs, nurse navigators, and research team.  The suggestions and impressions in this note reflect group consensus opinion but do not substitute bedside evaluation and management by the primary team.     Attendees:  Serenity Martinez, Marcela Larry, Beatris Niño, Flaca Arnett, Farzana Verma, Dr. Lewis, Jose Kline, Bud Loera, Tom Johnson, Dr. Meneses, Dr. Mustafa    Ruth Veloz is a 74 y.o. year old female  Medical record number: [id number]    Referring Provider Dr. Watts    Brief Clinical Summary - clinical presentation/comorbidities:  74 y.o. y/o female presents for evaluation of severe, symptomatic mitral regurgitation.  Patient relates worsening fatigue and Tiredness not associated with exertion. Patient associates worsening symptoms with afib. She reports ED visit 3 months ago with HF symptoms. Denies syncope or presyncope.  Denies increased abdominal girth, edema.  Gradually doing less and less activity secondary to symptoms.    Reasonable surgical candidate, if not an anatomic candidate for DAVON.     Valve and Structural Heart Work Up  Echo: 40 EF , severe mitral regurgitation  EKG: afib,, 74 QRS, premature ventricular complexes  - NYHA: 2  - LHC: pending  - ZEENAT: 12/17/2024 mild - mod MR ;  mild - mod AI  - dental clearance: regular dentist visits q6 months, no issues.   EFT 1/5  STS  Procedure Type: Isolated MVr  Perioperative OutcomeEstimate %  Operative Mortality0.918%  Procedure Type: Isolated MVR  Perioperative OutcomeEstimate %  Operative Mortality1.47%  KCCQ/walk    Group consensus recommendation: Patient found to have non-severe MR.  Medical therapy at this time.      To contact the  [facility] contact  Transfer Center or the office [phone].

## 2025-01-14 ENCOUNTER — TELEPHONE (OUTPATIENT)
Dept: ORTHOPEDIC SURGERY | Facility: CLINIC | Age: 75
End: 2025-01-14
Payer: MEDICARE

## 2025-01-14 DIAGNOSIS — I48.0 PAROXYSMAL ATRIAL FIBRILLATION (MULTI): ICD-10-CM

## 2025-01-14 DIAGNOSIS — Z96.642 S/P HIP REPLACEMENT, LEFT: Primary | ICD-10-CM

## 2025-01-14 RX ORDER — APIXABAN 5 MG/1
5 TABLET, FILM COATED ORAL 2 TIMES DAILY
Qty: 180 TABLET | Refills: 3 | Status: SHIPPED | OUTPATIENT
Start: 2025-01-14

## 2025-01-14 RX ORDER — CLINDAMYCIN HYDROCHLORIDE 300 MG/1
900 CAPSULE ORAL ONCE
Qty: 6 CAPSULE | Refills: 1 | Status: SHIPPED | OUTPATIENT
Start: 2025-01-14 | End: 2025-01-14

## 2025-01-14 NOTE — TELEPHONE ENCOUNTER
Patient called and is going to the dentist  needs   clindamycin (Cleocin HCL) 300 mg capsule   Sent to   [id number] - [place], [place] - [address] AT [address] & [address] Phone: [phone]   Fax: [phone]        **patient is allergic to Amoxicillin

## 2025-01-23 NOTE — PROGRESS NOTES
Subjective      Chief Complaint   Patient presents with    Stress test  result           That she was seen by the valve and structural heart multidisciplinary meeting for possible mitral valve clipping is a consensus the mitral regurg was not severe enough.  She is tired but feeling alright.lk  she will feel the heart racing and no kdizziness or lighthededness.  The dig does seem to help and will use it once a week.   She is not complaining of chest discomfort.  No complaints of PND or orthopnea.  The legs are not swelling on her.  NO palpitaions.           ROS     Past Surgical History:   Procedure Laterality Date    HIP SURGERY Left 01/10/2024    total    OTHER SURGICAL HISTORY  12/13/2021    Balloon sinuplasty    SMALL INTESTINE SURGERY  2001    twisted bowel repair        Active Ambulatory Problems     Diagnosis Date Noted    Acute sinusitis 09/17/2023    Allergic rhinitis 09/17/2023    Aortic valve disease 09/17/2023    Aortic valve regurgitation 09/17/2023    Bursitis of right shoulder 09/17/2023    Cervicalgia 09/17/2023    Gastroesophageal reflux disease 09/17/2023    Cervix prolapsed into vagina 09/17/2023    Menopausal symptom 09/17/2023    Polyp of nasal cavity 09/17/2023    Paroxysmal atrial fibrillation (Multi) 09/17/2023    Pure hypercholesterolemia 09/17/2023    Spinal stenosis of lumbar region 09/17/2023    Vitamin D deficiency 09/17/2023    Left hip pain 11/09/2023    Osteoarthritis of left hip 11/09/2023    Preop cardiovascular exam 12/21/2023    Mitral regurgitation 01/10/2024    Dislocation of hip prosthesis (CMS-[facility]) 11/09/2023    Dislocation of hip joint prosthesis (CMS-[facility]) 11/09/2023    Cellulitis of left hip 02/09/2024    History of immune disorder 02/09/2024    History of repair of hip joint 02/09/2024    Osteoporosis 03/10/2023    Post-traumatic osteoarthritis, other specified site 12/27/2023    Sprain of hip 02/09/2024    S/P hip replacement, left 02/12/2024    Left ankle pain 09/27/2024  "   Moderate left ankle sprain 09/27/2024     Resolved Ambulatory Problems     Diagnosis Date Noted    Acquired coagulation disorder (Multi) 09/17/2023     Past Medical History:   Diagnosis Date    A-fib (Multi)     GERD (gastroesophageal reflux disease)     Hip pain, acute, left     Personal history of diseases of the blood and blood-forming organs and certain disorders involving the immune mechanism     Personal history of other diseases of the digestive system         Visit Vitals  /78   Pulse 79   Wt 57.2 kg (126 lb)   SpO2 97%   BMI 23.81 kg/m²   OB Status Postmenopausal   Smoking Status Never   BSA 1.57 m²        Objective     Constitutional:       Appearance: Healthy appearance.   Neck:      Vascular: No JVR.   Pulmonary:      Effort: Pulmonary effort is normal.      Breath sounds: Normal breath sounds.   Cardiovascular:      PMI at left midclavicular line. Normal rate. Regular rhythm. Normal S1. Normal S2.       Murmurs: There is a grade 1to 2/6 holosystolic murmur.      No gallop.  No click. No rub.   Pulses:     Intact distal pulses.   Abdominal:      Palpations: Abdomen is soft.   Musculoskeletal: Normal range of motion. Skin:     General: Skin is warm and dry.   Neurological:      General: No focal deficit present.            Lab Review:         Lab Results   Component Value Date    CHOL 216 (H) 10/18/2024    CHOL 206 (H) 09/08/2023    CHOL 212 (H) 09/01/2022     Lab Results   Component Value Date    HDL 68.3 10/18/2024    HDL 68 09/08/2023    HDL 76 09/01/2022     Lab Results   Component Value Date    LDLCALC 123 (H) 10/18/2024    LDLCALC 112 09/08/2023    LDLCALC 116 09/01/2022     Lab Results   Component Value Date    TRIG 125 10/18/2024    TRIG 132 09/08/2023    TRIG 98 09/01/2022     No components found for: \"CHOLHDL\"     Assessment/Plan     Paroxysmal atrial fibrillation (Multi)  Is doing well and the afib is rare.  She will take the dig if needed    Mitral regurgitation  The MR is stab;le and " did not need a mitral clip. The EF is 40-45% and is stable.  Watch the MR and see in 6 month

## 2025-01-27 ENCOUNTER — OFFICE VISIT (OUTPATIENT)
Dept: CARDIOLOGY | Facility: CLINIC | Age: 75
End: 2025-01-27
Payer: MEDICARE

## 2025-01-27 VITALS
WEIGHT: 126 LBS | OXYGEN SATURATION: 97 % | DIASTOLIC BLOOD PRESSURE: 78 MMHG | BODY MASS INDEX: 23.81 KG/M2 | SYSTOLIC BLOOD PRESSURE: 121 MMHG | HEART RATE: 79 BPM

## 2025-01-27 DIAGNOSIS — I34.0 NONRHEUMATIC MITRAL VALVE REGURGITATION: ICD-10-CM

## 2025-01-27 DIAGNOSIS — I48.0 PAROXYSMAL ATRIAL FIBRILLATION (MULTI): Primary | ICD-10-CM

## 2025-01-27 PROCEDURE — 99213 OFFICE O/P EST LOW 20 MIN: CPT | Performed by: INTERNAL MEDICINE

## 2025-01-27 PROCEDURE — 1159F MED LIST DOCD IN RCRD: CPT | Performed by: INTERNAL MEDICINE

## 2025-01-27 PROCEDURE — 1126F AMNT PAIN NOTED NONE PRSNT: CPT | Performed by: INTERNAL MEDICINE

## 2025-01-27 PROCEDURE — 1157F ADVNC CARE PLAN IN RCRD: CPT | Performed by: INTERNAL MEDICINE

## 2025-01-27 PROCEDURE — 1036F TOBACCO NON-USER: CPT | Performed by: INTERNAL MEDICINE

## 2025-01-27 ASSESSMENT — LIFESTYLE VARIABLES: TOTAL SCORE: 0

## 2025-01-27 ASSESSMENT — PAIN SCALES - GENERAL: PAINLEVEL_OUTOF10: 0-NO PAIN

## 2025-01-27 ASSESSMENT — ENCOUNTER SYMPTOMS
OCCASIONAL FEELINGS OF UNSTEADINESS: 0
DEPRESSION: 0
LOSS OF SENSATION IN FEET: 0

## 2025-01-27 NOTE — ASSESSMENT & PLAN NOTE
The MR is stab;le and did not need a mitral clip. The EF is 40-45% and is stable.  Watch the MR and see in 6 month

## 2025-01-29 ENCOUNTER — TELEPHONE (OUTPATIENT)
Dept: CARDIOLOGY | Facility: CLINIC | Age: 75
End: 2025-01-29
Payer: MEDICARE

## 2025-02-06 ENCOUNTER — TELEPHONE (OUTPATIENT)
Dept: ORTHOPEDIC SURGERY | Facility: CLINIC | Age: 75
End: 2025-02-06
Payer: MEDICARE

## 2025-02-06 DIAGNOSIS — Z96.642 S/P HIP REPLACEMENT, LEFT: Primary | ICD-10-CM

## 2025-02-06 RX ORDER — CLINDAMYCIN HYDROCHLORIDE 300 MG/1
900 CAPSULE ORAL DAILY
Qty: 3 CAPSULE | Refills: 3 | Status: SHIPPED | OUTPATIENT
Start: 2025-02-06 | End: 2025-02-07

## 2025-02-06 RX ORDER — CLINDAMYCIN HYDROCHLORIDE 300 MG/1
300 CAPSULE ORAL 3 TIMES DAILY
Qty: 3 CAPSULE | Refills: 3 | Status: SHIPPED | OUTPATIENT
Start: 2025-02-06 | End: 2025-02-06

## 2025-02-06 NOTE — TELEPHONE ENCOUNTER
Patient needs at least 3 refills of below medication for upcoming dental process.      clindamycin (Cleocin HCL) 300 mg capsule         [id number] [address] AT [address] & [address] [place] Phone: [phone]   Fax: [phone]

## 2025-02-10 ENCOUNTER — APPOINTMENT (OUTPATIENT)
Dept: CARDIOLOGY | Facility: CLINIC | Age: 75
End: 2025-02-10
Payer: MEDICARE

## 2025-02-22 DIAGNOSIS — I48.0 PAROXYSMAL ATRIAL FIBRILLATION (MULTI): ICD-10-CM

## 2025-02-24 RX ORDER — SOTALOL HYDROCHLORIDE 80 MG/1
TABLET ORAL
Qty: 135 TABLET | Refills: 3 | Status: SHIPPED | OUTPATIENT
Start: 2025-02-24

## 2025-04-07 ENCOUNTER — TELEPHONE (OUTPATIENT)
Dept: PRIMARY CARE | Facility: CLINIC | Age: 75
End: 2025-04-07
Payer: MEDICARE

## 2025-04-08 DIAGNOSIS — I48.0 PAROXYSMAL ATRIAL FIBRILLATION (MULTI): Primary | ICD-10-CM

## 2025-04-08 DIAGNOSIS — E78.00 PURE HYPERCHOLESTEROLEMIA: ICD-10-CM

## 2025-04-08 DIAGNOSIS — E55.9 VITAMIN D DEFICIENCY: ICD-10-CM

## 2025-04-15 NOTE — PROGRESS NOTES
Subjective      Past Surgical History:   Procedure Laterality Date    HIP SURGERY Left 01/10/2024    total    OTHER SURGICAL HISTORY  12/13/2021    Balloon sinuplasty    SMALL INTESTINE SURGERY  2001    twisted bowel repair          Patient History      This patient is s/p left hip replacement  done by myself on 1/10/2024 and subsequent closed reduction of left hip prosthesis on 1-.  She presents today and states that her left hip pain is now markedly improved from preoperatively.  She  is slowly resumed her normal activities of daily living. She has finished working with home physical therapy and is doing exercises on her own.  She is seen today walking independently without   Support.  She denies chest pain, shortness of breath.  She states that her left ankle discomfort continues to improve.     There were no vitals taken for this visit.     ROS  CARDIOLOGY:   Negative for chest pain, shortness of breath.   RESPIRATORY:   Negative for chest pain, shortness of breath.   MUSCULOSKELETAL:   See HPI for details.   NEUROLOGY:   Negative for tingling, numbness, weakness.      Physical exam: Left hip: incision is clean dry and well healed. No erythema, purulent drainage or foul smell noted. Left lower extremity is in good position. Nontender in the left calf. Neurovascular is intact. No pain with gentle flexion and extension at the left ankle. No swelling at the left ankle. There is full active and passive painless range of motionThe patient is seen walking independently without support.     ECG 12 lead    Result Date: 1/29/2024  Atrial fibrillation with rapid ventricular response with premature ventricular or aberrantly conducted complexes Minimal voltage criteria for LVH, may be normal variant Nonspecific ST and T wave abnormality Abnormal ECG No previous ECGs available    ECG 12 lead    Result Date: 1/24/2024  Normal sinus rhythm Minimal voltage criteria for LVH, may be normal variant Borderline ECG Confirmed  by Dell Pena ([id number]) on 1/24/2024 6:23:05 AM    ECG 12 Lead    Result Date: 1/24/2024  Normal sinus rhythm Nonspecific T wave abnormality Abnormal ECG Confirmed by Dell Pena ([id number]) on 1/24/2024 6:21:25 AM    ECG 12 lead    Result Date: 1/15/2024  Atrial flutter with variable AV block with premature ventricular or aberrantly conducted complexes Minimal voltage criteria for LVH, may be normal variant Nonspecific T wave abnormality Abnormal ECG No previous ECGs available Confirmed by German Oneal ([id number]) on 1/15/2024 11:23:52 AM    FL less than 1 hour    Result Date: 1/13/2024  These images are not reportable by radiology and will not be interpreted by  Radiologists.    XR hip left with pelvis  x-ray done and read in the office today is reviewed with the patient and her  in the office today and shows that there is a left total hip replacement in overall satisfactory position and alignment.    Result Date: 1/11/2024  Interpreted By:  Benji Cat, STUDY: XR HIP LEFT WITH PELVIS WHEN PERFORMED 1 VIEW; ;  1/11/2024 6:15 pm   INDICATION: Signs/Symptoms:Reevaluate.   COMPARISON: 01/10/2024   ACCESSION NUMBER(S): [id number]   ORDERING CLINICIAN: SAIDA NORMAN   FINDINGS: There is again total left hip arthroplasty. The prosthetic left femoral head appears to be subluxated laterally to the prosthetic acetabular cup; allowing for differences in technique the subluxation appears to have worsened since the prior exam. No acute fracture is seen. There is no lucency around the hardware to suggest loosening. Postsurgical gas again overlies the soft tissues.       Persistent worsening lateral subluxation of the prosthetic left femoral head in relation to the prosthetic acetabular cup.   Signed by: Benji Cat 1/11/2024 6:24 PM Dictation workstation:   [id number]    XR hip left with pelvis when performed 1 view    Result Date: 1/10/2024  Interpreted By:  Ramon Gamble, STUDY: XR HIP LEFT WITH PELVIS  WHEN PERFORMED 1 VIEW; ;  1/10/2024 3:55 pm   INDICATION: Signs/Symptoms:Post op hip.   COMPARISON: 11/09/2023   ACCESSION NUMBER(S): [id number]   ORDERING CLINICIAN: SADE ALTMAN   FINDINGS: Single limited view the left hip. Interval postsurgical changes of left total hip arthroplasty. There is soft tissue swelling and gas overlying the left hip.   On this single limited radiograph there appears to be slight lateral subluxation of the femoral head with respect to the acetabular cup. Correlation and possible repeat radiographs is recommended.      x-rays of the left ankle done and read in the office today as well as all x-rays listed above are reviewed with the patient in the office today.  See findings.     MACRO: None   Signed by: Ramon Gamble 1/10/2024 4:13 PM Dictation workstation:   [id number]      Ruth was seen today for pain.  Diagnoses and all orders for this visit:  S/P hip replacement, left  -     XR hip left with pelvis when performed 2 or 3 views; Future      Options are discussed with the patient in the presence of her  in detail.  The patient is instructed regarding total hip position and infection precautions, activity modification and risk for further injury with falling or trauma and to use a cane for support while walking, ice, provider directed at home gentle strengthening and ROM exercises, and the appropriate use of Tylenol as needed for pain with its potential adverse reactions and side effects. The patient understands. Return as needed. Please note that this report has been produced using speech recognition software.  It may contain errors related to grammar, punctuation or spelling.  Electronically signed, but not reviewed. Tirso Medina MD

## 2025-04-17 ENCOUNTER — OFFICE VISIT (OUTPATIENT)
Dept: ORTHOPEDIC SURGERY | Facility: CLINIC | Age: 75
End: 2025-04-17
Payer: MEDICARE

## 2025-04-17 VITALS — BODY MASS INDEX: 22.63 KG/M2 | HEIGHT: 62 IN | WEIGHT: 123 LBS

## 2025-04-17 DIAGNOSIS — Z96.642 S/P HIP REPLACEMENT, LEFT: ICD-10-CM

## 2025-04-17 PROCEDURE — 1159F MED LIST DOCD IN RCRD: CPT | Performed by: ORTHOPAEDIC SURGERY

## 2025-04-17 PROCEDURE — 1036F TOBACCO NON-USER: CPT | Performed by: ORTHOPAEDIC SURGERY

## 2025-04-17 PROCEDURE — 1125F AMNT PAIN NOTED PAIN PRSNT: CPT | Performed by: ORTHOPAEDIC SURGERY

## 2025-04-17 PROCEDURE — 1160F RVW MEDS BY RX/DR IN RCRD: CPT | Performed by: ORTHOPAEDIC SURGERY

## 2025-04-17 PROCEDURE — 1157F ADVNC CARE PLAN IN RCRD: CPT | Performed by: ORTHOPAEDIC SURGERY

## 2025-04-17 PROCEDURE — 99213 OFFICE O/P EST LOW 20 MIN: CPT | Performed by: ORTHOPAEDIC SURGERY

## 2025-04-17 ASSESSMENT — LIFESTYLE VARIABLES
HOW OFTEN DURING THE LAST YEAR HAVE YOU NEEDED AN ALCOHOLIC DRINK FIRST THING IN THE MORNING TO GET YOURSELF GOING AFTER A NIGHT OF HEAVY DRINKING: NEVER
HOW OFTEN DURING THE LAST YEAR HAVE YOU FAILED TO DO WHAT WAS NORMALLY EXPECTED FROM YOU BECAUSE OF DRINKING: NEVER
HOW OFTEN DO YOU HAVE A DRINK CONTAINING ALCOHOL: NEVER
HOW MANY STANDARD DRINKS CONTAINING ALCOHOL DO YOU HAVE ON A TYPICAL DAY: PATIENT DOES NOT DRINK
SKIP TO QUESTIONS 9-10: 1
HOW OFTEN DURING THE LAST YEAR HAVE YOU FOUND THAT YOU WERE NOT ABLE TO STOP DRINKING ONCE YOU HAD STARTED: NEVER
AUDIT TOTAL SCORE: 0
HAS A RELATIVE, FRIEND, DOCTOR, OR ANOTHER HEALTH PROFESSIONAL EXPRESSED CONCERN ABOUT YOUR DRINKING OR SUGGESTED YOU CUT DOWN: NO
HAVE YOU OR SOMEONE ELSE BEEN INJURED AS A RESULT OF YOUR DRINKING: NO
HOW OFTEN DURING THE LAST YEAR HAVE YOU BEEN UNABLE TO REMEMBER WHAT HAPPENED THE NIGHT BEFORE BECAUSE YOU HAD BEEN DRINKING: NEVER
HOW OFTEN DURING THE LAST YEAR HAVE YOU HAD A FEELING OF GUILT OR REMORSE AFTER DRINKING: NEVER
HOW OFTEN DO YOU HAVE SIX OR MORE DRINKS ON ONE OCCASION: NEVER
AUDIT-C TOTAL SCORE: 0

## 2025-04-17 ASSESSMENT — PAIN - FUNCTIONAL ASSESSMENT: PAIN_FUNCTIONAL_ASSESSMENT: 0-10

## 2025-04-17 ASSESSMENT — ENCOUNTER SYMPTOMS
OCCASIONAL FEELINGS OF UNSTEADINESS: 0
DEPRESSION: 0
LOSS OF SENSATION IN FEET: 0

## 2025-04-17 ASSESSMENT — PAIN SCALES - GENERAL
PAINLEVEL_OUTOF10: 3
PAINLEVEL_OUTOF10: 3

## 2025-04-17 ASSESSMENT — COLUMBIA-SUICIDE SEVERITY RATING SCALE - C-SSRS
6. HAVE YOU EVER DONE ANYTHING, STARTED TO DO ANYTHING, OR PREPARED TO DO ANYTHING TO END YOUR LIFE?: NO
2. HAVE YOU ACTUALLY HAD ANY THOUGHTS OF KILLING YOURSELF?: NO
1. IN THE PAST MONTH, HAVE YOU WISHED YOU WERE DEAD OR WISHED YOU COULD GO TO SLEEP AND NOT WAKE UP?: NO

## 2025-04-17 ASSESSMENT — PAIN DESCRIPTION - DESCRIPTORS: DESCRIPTORS: ACHING

## 2025-04-17 NOTE — PATIENT INSTRUCTIONS
Continue to rest, and ice your hip.  Continue your pain regimen.     Remember to call our office for antibiotics before dental work.   Remember not to internally rotate your hip to avoid dislocation.   Do not kneel, twist, or squat and avoid heavy lifting.   Return    as needed.

## 2025-04-18 ENCOUNTER — TELEMEDICINE (OUTPATIENT)
Dept: PHARMACY | Facility: HOSPITAL | Age: 75
End: 2025-04-18
Payer: MEDICARE

## 2025-04-18 DIAGNOSIS — M81.0 AGE-RELATED OSTEOPOROSIS WITHOUT CURRENT PATHOLOGICAL FRACTURE: ICD-10-CM

## 2025-04-18 DIAGNOSIS — M81.0 AGE-RELATED OSTEOPOROSIS WITHOUT CURRENT PATHOLOGICAL FRACTURE: Primary | ICD-10-CM

## 2025-04-18 LAB
25(OH)D3+25(OH)D2 SERPL-MCNC: 67 NG/ML (ref 30–100)
ALBUMIN SERPL-MCNC: 4.4 G/DL (ref 3.6–5.1)
ALP SERPL-CCNC: 42 U/L (ref 37–153)
ALT SERPL-CCNC: 10 U/L (ref 6–29)
ANION GAP SERPL CALCULATED.4IONS-SCNC: 9 MMOL/L (CALC) (ref 7–17)
AST SERPL-CCNC: 18 U/L (ref 10–35)
BILIRUB SERPL-MCNC: 0.8 MG/DL (ref 0.2–1.2)
BUN SERPL-MCNC: 20 MG/DL (ref 7–25)
CALCIUM SERPL-MCNC: 9.6 MG/DL (ref 8.6–10.4)
CHLORIDE SERPL-SCNC: 105 MMOL/L (ref 98–110)
CHOLEST SERPL-MCNC: 203 MG/DL
CHOLEST/HDLC SERPL: 2.8 (CALC)
CO2 SERPL-SCNC: 27 MMOL/L (ref 20–32)
CREAT SERPL-MCNC: 0.75 MG/DL (ref 0.6–1)
EGFRCR SERPLBLD CKD-EPI 2021: 83 ML/MIN/1.73M2
ERYTHROCYTE [DISTWIDTH] IN BLOOD BY AUTOMATED COUNT: 13.2 % (ref 11–15)
GLUCOSE SERPL-MCNC: 73 MG/DL (ref 65–99)
HCT VFR BLD AUTO: 44.5 % (ref 35–45)
HDLC SERPL-MCNC: 73 MG/DL
HGB BLD-MCNC: 14.1 G/DL (ref 11.7–15.5)
LDLC SERPL CALC-MCNC: 110 MG/DL (CALC)
MCH RBC QN AUTO: 28.3 PG (ref 27–33)
MCHC RBC AUTO-ENTMCNC: 31.7 G/DL (ref 32–36)
MCV RBC AUTO: 89.4 FL (ref 80–100)
NONHDLC SERPL-MCNC: 130 MG/DL (CALC)
PLATELET # BLD AUTO: 297 THOUSAND/UL (ref 140–400)
PMV BLD REES-ECKER: 10.7 FL (ref 7.5–12.5)
POTASSIUM SERPL-SCNC: 4.9 MMOL/L (ref 3.5–5.3)
PROT SERPL-MCNC: 7.2 G/DL (ref 6.1–8.1)
RBC # BLD AUTO: 4.98 MILLION/UL (ref 3.8–5.1)
SODIUM SERPL-SCNC: 141 MMOL/L (ref 135–146)
TRIGL SERPL-MCNC: 101 MG/DL
WBC # BLD AUTO: 5.4 THOUSAND/UL (ref 3.8–10.8)

## 2025-04-18 NOTE — PROGRESS NOTES
PROLIA NOTES  Prolia Injection  , M81.0    Ruth Veloz is a 74 y.o. female who was referred by Constantino Milton MD for in-office injection of Prolia.   Spoke with patiet via telephone today.  Patient verbally consented to proceed with Osteoporosis treatment/Prolia.     Calcium   Date Value Ref Range Status   10/22/2024 9.8 8.6 - 10.3 mg/dL Final     Vitamin D, 25-Hydroxy, Total   Date Value Ref Range Status   10/18/2024 37 30 - 100 ng/mL Final     eGFR   Date Value Ref Range Status   10/22/2024 >90 >60 mL/min/1.73m*2 Final     Comment:     Calculations of estimated GFR are performed using the 2021 CKD-EPI Study Refit equation without the race variable for the IDMS-Traceable creatinine methods.  https://jasn.asnjournals.org/content/early/2021/09/22/ASN.6925693600        Last DEXA scan:  03/10/2023      Next dose due: 05/18/2025    PA needed? No    Prolia injection via: [employer]    Bloodwork ordered? Yes - completed 4/18/2025  Medication ordered? Yes - use office supply      Appointment scheduled? No  Patient has dental work (crown) in May. Discussed with her dentist that she is on prolia and we will wait until she is fully healed to continue prolia after her dental work. Patient will call the office after her procedure to schedule next prolia visit. Will also discuss with Dr. Milton at visit on 4/30/2025      Other Notes:  Discussed cost, MOA, potential SE, pre-injection bloodwork, post injection bloodwork, instructed to call office after injection with any signs of hypocalcemia.     ALESHA Tomas, ShandraD, BCPS

## 2025-04-22 ENCOUNTER — TELEPHONE (OUTPATIENT)
Facility: CLINIC | Age: 75
End: 2025-04-22
Payer: MEDICARE

## 2025-04-22 NOTE — TELEPHONE ENCOUNTER
Patient called today stating that she has a dental procedure on 5/7 for a crown.  Her dentist wants her to stop eliquis 5 days prior to the procedure, she is asking if this is okay with you?  Please advise, thanks  Call back [phone]    Left message with , he expressed understanding and will relay message to Ruth.

## 2025-04-30 ENCOUNTER — OFFICE VISIT (OUTPATIENT)
Dept: PRIMARY CARE | Facility: CLINIC | Age: 75
End: 2025-04-30
Payer: MEDICARE

## 2025-04-30 VITALS
DIASTOLIC BLOOD PRESSURE: 80 MMHG | WEIGHT: 124 LBS | TEMPERATURE: 96.8 F | HEIGHT: 62 IN | BODY MASS INDEX: 22.82 KG/M2 | SYSTOLIC BLOOD PRESSURE: 128 MMHG | OXYGEN SATURATION: 97 % | HEART RATE: 72 BPM

## 2025-04-30 DIAGNOSIS — Z00.00 ROUTINE GENERAL MEDICAL EXAMINATION AT HEALTH CARE FACILITY: Primary | ICD-10-CM

## 2025-04-30 DIAGNOSIS — M81.0 AGE-RELATED OSTEOPOROSIS WITHOUT CURRENT PATHOLOGICAL FRACTURE: ICD-10-CM

## 2025-04-30 DIAGNOSIS — K21.9 GASTROESOPHAGEAL REFLUX DISEASE WITHOUT ESOPHAGITIS: ICD-10-CM

## 2025-04-30 DIAGNOSIS — E78.00 PURE HYPERCHOLESTEROLEMIA: ICD-10-CM

## 2025-04-30 DIAGNOSIS — Z12.11 SCREENING FOR COLON CANCER: ICD-10-CM

## 2025-04-30 DIAGNOSIS — I48.0 PAROXYSMAL ATRIAL FIBRILLATION (MULTI): ICD-10-CM

## 2025-04-30 DIAGNOSIS — M21.70 LEG LENGTH DISCREPANCY: ICD-10-CM

## 2025-04-30 DIAGNOSIS — E55.9 VITAMIN D DEFICIENCY: ICD-10-CM

## 2025-04-30 PROBLEM — L03.116 CELLULITIS OF LEFT HIP: Status: RESOLVED | Noted: 2024-02-09 | Resolved: 2025-04-30

## 2025-04-30 PROBLEM — Z01.810 PREOP CARDIOVASCULAR EXAM: Status: RESOLVED | Noted: 2023-12-21 | Resolved: 2025-04-30

## 2025-04-30 PROBLEM — Z86.2 HISTORY OF IMMUNE DISORDER: Status: RESOLVED | Noted: 2024-02-09 | Resolved: 2025-04-30

## 2025-04-30 PROCEDURE — 99215 OFFICE O/P EST HI 40 MIN: CPT | Performed by: INTERNAL MEDICINE

## 2025-04-30 PROCEDURE — 99214 OFFICE O/P EST MOD 30 MIN: CPT | Performed by: INTERNAL MEDICINE

## 2025-04-30 PROCEDURE — 1159F MED LIST DOCD IN RCRD: CPT | Performed by: INTERNAL MEDICINE

## 2025-04-30 PROCEDURE — 1124F ACP DISCUSS-NO DSCNMKR DOCD: CPT | Performed by: INTERNAL MEDICINE

## 2025-04-30 PROCEDURE — G0439 PPPS, SUBSEQ VISIT: HCPCS | Performed by: INTERNAL MEDICINE

## 2025-04-30 PROCEDURE — 1126F AMNT PAIN NOTED NONE PRSNT: CPT | Performed by: INTERNAL MEDICINE

## 2025-04-30 PROCEDURE — 1036F TOBACCO NON-USER: CPT | Performed by: INTERNAL MEDICINE

## 2025-04-30 PROCEDURE — 99214 OFFICE O/P EST MOD 30 MIN: CPT | Mod: 25 | Performed by: INTERNAL MEDICINE

## 2025-04-30 PROCEDURE — 1123F ACP DISCUSS/DSCN MKR DOCD: CPT | Performed by: INTERNAL MEDICINE

## 2025-04-30 PROCEDURE — 1157F ADVNC CARE PLAN IN RCRD: CPT | Performed by: INTERNAL MEDICINE

## 2025-04-30 ASSESSMENT — ENCOUNTER SYMPTOMS
LOSS OF SENSATION IN FEET: 0
OCCASIONAL FEELINGS OF UNSTEADINESS: 0
DEPRESSION: 0

## 2025-04-30 ASSESSMENT — PAIN SCALES - GENERAL: PAINLEVEL_OUTOF10: 0-NO PAIN

## 2025-05-15 ENCOUNTER — HOSPITAL ENCOUNTER (OUTPATIENT)
Dept: RADIOLOGY | Facility: HOSPITAL | Age: 75
Discharge: HOME | End: 2025-05-15
Payer: MEDICARE

## 2025-05-15 DIAGNOSIS — M81.0 AGE-RELATED OSTEOPOROSIS WITHOUT CURRENT PATHOLOGICAL FRACTURE: ICD-10-CM

## 2025-05-15 LAB — NONINV COLON CA DNA+OCC BLD SCRN STL QL: NEGATIVE

## 2025-05-15 PROCEDURE — 77080 DXA BONE DENSITY AXIAL: CPT

## 2025-05-16 ENCOUNTER — TELEPHONE (OUTPATIENT)
Dept: PRIMARY CARE | Facility: CLINIC | Age: 75
End: 2025-05-16
Payer: MEDICARE

## 2025-05-16 NOTE — TELEPHONE ENCOUNTER
----- Message from Constantino Milton sent at 5/15/2025  5:10 PM EDT -----  Negative for osteoporosis  ----- Message -----  From: Interface, Radiology Results In  Sent: 5/15/2025  12:20 PM EDT  To: Constantino Milton MD

## 2025-05-16 NOTE — TELEPHONE ENCOUNTER
----- Message from Constantino Milton sent at 5/15/2025  5:10 PM EDT -----  Negative  ----- Message -----  From: Sandra Kay Results In  Sent: 5/15/2025   7:17 AM EDT  To: Constantino Milton MD

## 2025-06-19 ENCOUNTER — TELEPHONE (OUTPATIENT)
Dept: PRIMARY CARE | Facility: CLINIC | Age: 75
End: 2025-06-19
Payer: MEDICARE

## 2025-06-19 NOTE — TELEPHONE ENCOUNTER
Spoke with the patient who states that her dentist states it is ok to go ahead with scheduled prolia injection this month. Patient dfenies any pain or issues at this time caused by the prolia injection. Patient states that she will be in at scheduled time for her injection.

## 2025-06-19 NOTE — TELEPHONE ENCOUNTER
"Marcello pt calling for [place] about prolia shot. She states Caraline orders it and gives it to her.(?) Advised pt is on nurse schedule for 6/26. (Use office supply)  Pt had dental work 5/29 and dentist said \"it should be ok\" to get injection 6/26.  Pt requests call back to discuss and verify she can get injection.  "

## 2025-06-25 ENCOUNTER — APPOINTMENT (OUTPATIENT)
Dept: PRIMARY CARE | Facility: CLINIC | Age: 75
End: 2025-06-25
Payer: MEDICARE

## 2025-06-26 ENCOUNTER — CLINICAL SUPPORT (OUTPATIENT)
Dept: PRIMARY CARE | Facility: CLINIC | Age: 75
End: 2025-06-26
Payer: MEDICARE

## 2025-06-26 DIAGNOSIS — M81.0 AGE-RELATED OSTEOPOROSIS WITHOUT CURRENT PATHOLOGICAL FRACTURE: ICD-10-CM

## 2025-06-26 PROCEDURE — 96372 THER/PROPH/DIAG INJ SC/IM: CPT | Performed by: INTERNAL MEDICINE

## 2025-06-26 PROCEDURE — 2500000004 HC RX 250 GENERAL PHARMACY W/ HCPCS (ALT 636 FOR OP/ED): Mod: JZ | Performed by: INTERNAL MEDICINE

## 2025-06-26 RX ADMIN — DENOSUMAB 60 MG: 60 INJECTION SUBCUTANEOUS at 09:58

## 2025-07-15 ENCOUNTER — TELEPHONE (OUTPATIENT)
Dept: ORTHOPEDIC SURGERY | Facility: CLINIC | Age: 75
End: 2025-07-15
Payer: MEDICARE

## 2025-07-15 DIAGNOSIS — Z96.642 S/P HIP REPLACEMENT, LEFT: Primary | ICD-10-CM

## 2025-07-15 RX ORDER — CLINDAMYCIN HYDROCHLORIDE 300 MG/1
900 CAPSULE ORAL ONCE
Qty: 3 CAPSULE | Refills: 0 | Status: SHIPPED | OUTPATIENT
Start: 2025-07-15 | End: 2025-07-15

## 2025-07-15 NOTE — TELEPHONE ENCOUNTER
Patient called requesting an antibiotic for pre dental work. She states that she gets clindamyicin prescribed, she has allergies to :   Amoxicillin Medium Hives    Amoxicillin-pot Clavulanate Not Specified Hives    Flunisolide Not Specified Hives    Penicillin Not Specified Hives    Sulfa (sulfonamide Antibiotics)

## 2025-07-22 ENCOUNTER — OFFICE VISIT (OUTPATIENT)
Facility: CLINIC | Age: 75
End: 2025-07-22
Payer: MEDICARE

## 2025-07-22 VITALS
SYSTOLIC BLOOD PRESSURE: 124 MMHG | OXYGEN SATURATION: 98 % | BODY MASS INDEX: 23.24 KG/M2 | WEIGHT: 125 LBS | HEART RATE: 54 BPM | DIASTOLIC BLOOD PRESSURE: 74 MMHG

## 2025-07-22 DIAGNOSIS — I34.0 NONRHEUMATIC MITRAL VALVE REGURGITATION: Primary | ICD-10-CM

## 2025-07-22 DIAGNOSIS — I48.0 PAROXYSMAL ATRIAL FIBRILLATION (MULTI): ICD-10-CM

## 2025-07-22 PROCEDURE — 99212 OFFICE O/P EST SF 10 MIN: CPT

## 2025-07-22 PROCEDURE — 99213 OFFICE O/P EST LOW 20 MIN: CPT | Performed by: INTERNAL MEDICINE

## 2025-07-22 PROCEDURE — 1159F MED LIST DOCD IN RCRD: CPT | Performed by: INTERNAL MEDICINE

## 2025-07-22 PROCEDURE — 1036F TOBACCO NON-USER: CPT | Performed by: INTERNAL MEDICINE

## 2025-07-22 PROCEDURE — 1126F AMNT PAIN NOTED NONE PRSNT: CPT | Performed by: INTERNAL MEDICINE

## 2025-07-22 ASSESSMENT — ENCOUNTER SYMPTOMS
ORTHOPNEA: 0
WHEEZING: 0
WEAKNESS: 0
SYNCOPE: 0
DIZZINESS: 0
OCCASIONAL FEELINGS OF UNSTEADINESS: 0
NEAR-SYNCOPE: 0
CLAUDICATION: 0
SHORTNESS OF BREATH: 0
PND: 0
IRREGULAR HEARTBEAT: 0
COUGH: 0
MYALGIAS: 0
LOSS OF SENSATION IN FEET: 0
DEPRESSION: 0
DIAPHORESIS: 0
WEIGHT LOSS: 0
PALPITATIONS: 0
FEVER: 0
WEIGHT GAIN: 0
DYSPNEA ON EXERTION: 0

## 2025-07-22 ASSESSMENT — PAIN SCALES - GENERAL: PAINLEVEL_OUTOF10: 0-NO PAIN

## 2025-07-22 ASSESSMENT — LIFESTYLE VARIABLES: TOTAL SCORE: 0

## 2025-07-22 NOTE — ASSESSMENT & PLAN NOTE
Normal sinus rhythm currently.  Continue Eliquis for oral anticoagulation.   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 A diagnosis of Generalized anxiety disorder was also pertinent to this visit. Current symptoms: Continues to feel she has been doing well on her present medication as long as she is not overly stressed since last visit. Would like to keep medication unchanged. Mental status: Patient is well oriented. There is no evidence of psychotic thinking. Patient does not appear to be a danger to self or others at this time. No extra pyramidal signs reported. Side effects: None  Labs: None  Assessment:Stable on current medication. Plan: Continue current medication.

## 2025-07-22 NOTE — ASSESSMENT & PLAN NOTE
We discussed surveillance of her mitral regurgitation.  Will be reaching out to our structural heart team.  The question is whether to continue to use transthoracic echocardiography or transesophageal as the TTE gave us an over representation of the severity of her MR.

## 2025-07-22 NOTE — PROGRESS NOTES
Subjective      Chief Complaint   Patient presents with    6 month f/u        74-year-old female presents for cardiology follow-up.  She is a former patient of Dr. Watts.  She has atrial fibrillation that is paroxysmal.  She is on apixaban for oral anticoagulation.  She has an ejection fraction of 40 to 45%.  She has mitral regurgitation that was thought to been severe.  She was discussed with the structural heart team to determine candidacy for MitraClip DAVON versus surgical or transcatheter mitral valve replacement.  She had underwent ZEENAT as part of planning for this which identified only mild to moderate mitral valve regurgitation.  She also had mild to moderate aortic valve insufficiency.  This was in December 2024. She feels well, denies dyspnea or chest pain. She walks for exercise 3x/week and gardens without difficulty.            Review of Systems   Constitutional: Negative for diaphoresis, fever, weight gain and weight loss.   Eyes:  Negative for visual disturbance.   Cardiovascular:  Negative for chest pain, claudication, dyspnea on exertion, irregular heartbeat, leg swelling, near-syncope, orthopnea, palpitations, paroxysmal nocturnal dyspnea and syncope.   Respiratory:  Negative for cough, shortness of breath and wheezing.    Musculoskeletal:  Negative for muscle weakness and myalgias.   Neurological:  Negative for dizziness and weakness.   All other systems reviewed and are negative.       Medical History[1]     Surgical History[2]     Social History     Socioeconomic History    Marital status:      Spouse name: Not on file    Number of children: Not on file    Years of education: Not on file    Highest education level: Not on file   Occupational History    Not on file   Tobacco Use    Smoking status: Never     Passive exposure: Never    Smokeless tobacco: Never   Vaping Use    Vaping status: Never Used   Substance and Sexual Activity    Alcohol use: Not Currently    Drug use: Never    Sexual  activity: Yes     Partners: Male   Other Topics Concern    Not on file   Social History Narrative    Not on file     Social Drivers of Health     Financial Resource Strain: Low Risk  (1/16/2024)    Overall Financial Resource Strain (CARDIA)     Difficulty of Paying Living Expenses: Not hard at all   Food Insecurity: Not on file   Transportation Needs: No Transportation Needs (2/7/2024)    OASIS : Transportation     Lack of Transportation (Medical): No     Lack of Transportation (Non-Medical): No     Patient Unable or Declines to Respond: No   Physical Activity: Not on file   Stress: Not on file   Social Connections: Feeling Socially Integrated (2/7/2024)    OASIS : Social Isolation     Frequency of experiencing loneliness or isolation: Rarely   Intimate Partner Violence: Not on file   Housing Stability: Low Risk  (1/16/2024)    Housing Stability Vital Sign     Unable to Pay for Housing in the Last Year: No     Number of Places Lived in the Last Year: 1     Unstable Housing in the Last Year: No        Family History[3]     OBJECTIVE:    Vitals:    07/22/25 1002   BP: 124/74   Pulse: 54   SpO2: 98%        Vitals reviewed.   Constitutional:       Appearance: Normal and healthy appearance. Not in distress.   Pulmonary:      Effort: Pulmonary effort is normal.      Breath sounds: Normal breath sounds.   Cardiovascular:      Normal rate. Regular rhythm. Normal S1. Normal S2.       Murmurs: There is a grade 2/4 holodiastolic murmur at the ULSB.      No gallop.  No click.   Pulses:     Intact distal pulses.   Edema:     Peripheral edema absent.   Skin:     General: Skin is warm and dry.   Neurological:      General: No focal deficit present.        Lab Review:   Lab Results   Component Value Date     04/18/2025    K 4.9 04/18/2025     04/18/2025    CO2 27 04/18/2025    BUN 20 04/18/2025    CREATININE 0.75 04/18/2025    GLUCOSE 73 04/18/2025    CALCIUM 9.6 04/18/2025     Lab Results   Component Value  Date    CHOL 203 (H) 04/18/2025    TRIG 101 04/18/2025    HDL 73 04/18/2025       Lab Results   Component Value Date    LDLCALC 110 (H) 04/18/2025        Paroxysmal atrial fibrillation (Multi)  Normal sinus rhythm currently.  Continue Eliquis for oral anticoagulation.    Mitral regurgitation  We discussed surveillance of her mitral regurgitation.  Will be reaching out to our structural heart team.  The question is whether to continue to use transthoracic echocardiography or transesophageal as the TTE gave us an over representation of the severity of her MR.            [1]   Past Medical History:  Diagnosis Date    A-fib (Multi)     GERD (gastroesophageal reflux disease)     Hip pain, acute, left     Osteoporosis      3/23 DExa Katty T-1.3, hip neck T-1.7, forearm T-2.6 Prolia over due 4/24    Personal history of diseases of the blood and blood-forming organs and certain disorders involving the immune mechanism     History of autoimmune disorder    Personal history of other diseases of the digestive system     History of gastroesophageal reflux (GERD)   [2]   Past Surgical History:  Procedure Laterality Date    HIP SURGERY Left 01/10/2024    total    OTHER SURGICAL HISTORY  12/13/2021    Balloon sinuplasty    SMALL INTESTINE SURGERY  2001    twisted bowel repair   [3]   Family History  Problem Relation Name Age of Onset    Other (leaky valve) Mother      Stroke Mother      Hypertension Mother      Kidney failure Father      Cushing syndrome Sister      No Known Problems Brother      No Known Problems Daughter

## 2025-07-30 ENCOUNTER — TELEPHONE (OUTPATIENT)
Facility: CLINIC | Age: 75
End: 2025-07-30
Payer: MEDICARE

## 2025-07-30 NOTE — TELEPHONE ENCOUNTER
Patient called the office today stating that she was suppose to get a phone call from our office regarding if she needed another ZEENAT or echo?  Please advise, thanks  Call back [phone]

## 2025-08-08 DIAGNOSIS — I34.0 MITRAL VALVE INSUFFICIENCY, UNSPECIFIED ETIOLOGY: Primary | ICD-10-CM

## 2025-08-11 ENCOUNTER — OFFICE VISIT (OUTPATIENT)
Dept: CARDIOLOGY | Facility: CLINIC | Age: 75
End: 2025-08-11
Payer: MEDICARE

## 2025-08-11 ENCOUNTER — OFFICE VISIT (OUTPATIENT)
Dept: CARDIAC SURGERY | Facility: CLINIC | Age: 75
End: 2025-08-11
Payer: MEDICARE

## 2025-08-11 VITALS
SYSTOLIC BLOOD PRESSURE: 129 MMHG | HEART RATE: 63 BPM | DIASTOLIC BLOOD PRESSURE: 83 MMHG | BODY MASS INDEX: 23.94 KG/M2 | HEIGHT: 61 IN | WEIGHT: 126.8 LBS

## 2025-08-11 DIAGNOSIS — I34.0 MITRAL VALVE INSUFFICIENCY, UNSPECIFIED ETIOLOGY: Primary | ICD-10-CM

## 2025-08-11 DIAGNOSIS — I48.0 PAROXYSMAL ATRIAL FIBRILLATION (MULTI): ICD-10-CM

## 2025-08-11 DIAGNOSIS — I34.0 NONRHEUMATIC MITRAL VALVE REGURGITATION: Primary | ICD-10-CM

## 2025-08-11 PROCEDURE — 1036F TOBACCO NON-USER: CPT | Performed by: THORACIC SURGERY (CARDIOTHORACIC VASCULAR SURGERY)

## 2025-08-11 PROCEDURE — 99215 OFFICE O/P EST HI 40 MIN: CPT | Performed by: THORACIC SURGERY (CARDIOTHORACIC VASCULAR SURGERY)

## 2025-08-11 PROCEDURE — 1159F MED LIST DOCD IN RCRD: CPT | Performed by: THORACIC SURGERY (CARDIOTHORACIC VASCULAR SURGERY)

## 2025-08-11 PROCEDURE — 1126F AMNT PAIN NOTED NONE PRSNT: CPT | Performed by: THORACIC SURGERY (CARDIOTHORACIC VASCULAR SURGERY)

## 2025-08-11 ASSESSMENT — ENCOUNTER SYMPTOMS
OCCASIONAL FEELINGS OF UNSTEADINESS: 0
PALPITATIONS: 1
FATIGUE: 1
SHORTNESS OF BREATH: 1
NEUROLOGICAL NEGATIVE: 1
DEPRESSION: 0
LOSS OF SENSATION IN FEET: 1
GASTROINTESTINAL NEGATIVE: 1

## 2025-08-11 ASSESSMENT — COLUMBIA-SUICIDE SEVERITY RATING SCALE - C-SSRS
2. HAVE YOU ACTUALLY HAD ANY THOUGHTS OF KILLING YOURSELF?: NO
6. HAVE YOU EVER DONE ANYTHING, STARTED TO DO ANYTHING, OR PREPARED TO DO ANYTHING TO END YOUR LIFE?: NO
1. IN THE PAST MONTH, HAVE YOU WISHED YOU WERE DEAD OR WISHED YOU COULD GO TO SLEEP AND NOT WAKE UP?: NO

## 2025-08-11 ASSESSMENT — PAIN SCALES - GENERAL: PAINLEVEL_OUTOF10: 0-NO PAIN

## 2025-08-12 ENCOUNTER — ANCILLARY PROCEDURE (OUTPATIENT)
Dept: CARDIAC SURGERY | Facility: CLINIC | Age: 75
End: 2025-08-12
Payer: MEDICARE

## 2025-08-12 ENCOUNTER — TELEPHONE (OUTPATIENT)
Dept: PRIMARY CARE | Facility: CLINIC | Age: 75
End: 2025-08-12
Payer: MEDICARE

## 2025-08-12 DIAGNOSIS — I48.0 PAROXYSMAL A-FIB (MULTI): ICD-10-CM

## 2025-08-12 LAB
ALBUMIN SERPL-MCNC: 4.6 G/DL (ref 3.6–5.1)
ALP SERPL-CCNC: 50 U/L (ref 37–153)
ALT SERPL-CCNC: 10 U/L (ref 6–29)
ANION GAP SERPL CALCULATED.4IONS-SCNC: 11 MMOL/L (CALC) (ref 7–17)
AST SERPL-CCNC: 21 U/L (ref 10–35)
BILIRUB SERPL-MCNC: 0.4 MG/DL (ref 0.2–1.2)
BUN SERPL-MCNC: 23 MG/DL (ref 7–25)
CALCIUM SERPL-MCNC: 9.6 MG/DL (ref 8.6–10.4)
CHLORIDE SERPL-SCNC: 105 MMOL/L (ref 98–110)
CO2 SERPL-SCNC: 25 MMOL/L (ref 20–32)
CREAT SERPL-MCNC: 0.84 MG/DL (ref 0.6–1)
EGFRCR SERPLBLD CKD-EPI 2021: 72 ML/MIN/1.73M2
ERYTHROCYTE [DISTWIDTH] IN BLOOD BY AUTOMATED COUNT: 14.3 % (ref 11–15)
GLUCOSE SERPL-MCNC: 80 MG/DL (ref 65–99)
HCT VFR BLD AUTO: 45.4 % (ref 35–45)
HGB BLD-MCNC: 14.4 G/DL (ref 11.7–15.5)
MCH RBC QN AUTO: 28.9 PG (ref 27–33)
MCHC RBC AUTO-ENTMCNC: 31.7 G/DL (ref 32–36)
MCV RBC AUTO: 91 FL (ref 80–100)
PLATELET # BLD AUTO: 270 THOUSAND/UL (ref 140–400)
PMV BLD REES-ECKER: 10.6 FL (ref 7.5–12.5)
POTASSIUM SERPL-SCNC: 5.1 MMOL/L (ref 3.5–5.3)
PROT SERPL-MCNC: 7.7 G/DL (ref 6.1–8.1)
RBC # BLD AUTO: 4.99 MILLION/UL (ref 3.8–5.1)
SODIUM SERPL-SCNC: 141 MMOL/L (ref 135–146)
WBC # BLD AUTO: 7.5 THOUSAND/UL (ref 3.8–10.8)

## 2025-08-12 ASSESSMENT — ENCOUNTER SYMPTOMS
FEVER: 0
LIGHT-HEADEDNESS: 0
HEMATURIA: 0
MYALGIAS: 0
DIZZINESS: 0
CHILLS: 0
BLOOD IN STOOL: 0

## 2025-08-19 ENCOUNTER — TELEPHONE (OUTPATIENT)
Dept: CARDIAC SURGERY | Facility: CLINIC | Age: 75
End: 2025-08-19

## 2025-08-19 ENCOUNTER — OFFICE VISIT (OUTPATIENT)
Facility: CLINIC | Age: 75
End: 2025-08-19
Payer: MEDICARE

## 2025-08-19 VITALS
SYSTOLIC BLOOD PRESSURE: 122 MMHG | WEIGHT: 126.8 LBS | BODY MASS INDEX: 23.94 KG/M2 | HEIGHT: 61 IN | DIASTOLIC BLOOD PRESSURE: 70 MMHG

## 2025-08-19 DIAGNOSIS — I48.91 ATRIAL FIBRILLATION WITH RAPID VENTRICULAR RESPONSE (MULTI): ICD-10-CM

## 2025-08-19 DIAGNOSIS — I48.0 PAROXYSMAL ATRIAL FIBRILLATION (MULTI): Primary | ICD-10-CM

## 2025-08-19 PROCEDURE — 1036F TOBACCO NON-USER: CPT | Performed by: INTERNAL MEDICINE

## 2025-08-19 PROCEDURE — 99203 OFFICE O/P NEW LOW 30 MIN: CPT | Performed by: INTERNAL MEDICINE

## 2025-08-19 PROCEDURE — 1159F MED LIST DOCD IN RCRD: CPT | Performed by: INTERNAL MEDICINE

## 2025-08-19 PROCEDURE — 93005 ELECTROCARDIOGRAM TRACING: CPT | Performed by: INTERNAL MEDICINE

## 2025-08-19 PROCEDURE — 99212 OFFICE O/P EST SF 10 MIN: CPT

## 2025-08-19 PROCEDURE — 1126F AMNT PAIN NOTED NONE PRSNT: CPT | Performed by: INTERNAL MEDICINE

## 2025-08-19 ASSESSMENT — PAIN SCALES - GENERAL: PAINLEVEL_OUTOF10: 0-NO PAIN

## 2025-08-19 ASSESSMENT — ENCOUNTER SYMPTOMS
OCCASIONAL FEELINGS OF UNSTEADINESS: 0
DEPRESSION: 0
LOSS OF SENSATION IN FEET: 0

## 2025-08-26 ENCOUNTER — HOSPITAL ENCOUNTER (OUTPATIENT)
Dept: CARDIOLOGY | Facility: HOSPITAL | Age: 75
Discharge: HOME | End: 2025-08-26
Payer: MEDICARE

## 2025-08-26 DIAGNOSIS — I34.0 NONRHEUMATIC MITRAL VALVE REGURGITATION: ICD-10-CM

## 2025-08-26 LAB
AORTIC VALVE MEAN GRADIENT: 2 MMHG
AORTIC VALVE PEAK VELOCITY: 1.07 M/S
AV PEAK GRADIENT: 5 MMHG
AVA (PEAK VEL): 2.28 CM2
AVA (VTI): 2.12 CM2
EJECTION FRACTION APICAL 4 CHAMBER: 47.7
EJECTION FRACTION: 43 %
LEFT ATRIUM VOLUME AREA LENGTH INDEX BSA: 56.1 ML/M2
LEFT VENTRICLE INTERNAL DIMENSION DIASTOLE: 5.09 CM (ref 3.5–6)
LEFT VENTRICULAR OUTFLOW TRACT DIAMETER: 1.92 CM
LV EJECTION FRACTION BIPLANE: 48 %
MITRAL VALVE E/A RATIO: 1.77
RIGHT VENTRICLE FREE WALL PEAK S': 10.79 CM/S
RIGHT VENTRICLE PEAK SYSTOLIC PRESSURE: 34 MMHG
TRICUSPID ANNULAR PLANE SYSTOLIC EXCURSION: 2.2 CM

## 2025-08-26 PROCEDURE — 93306 TTE W/DOPPLER COMPLETE: CPT | Performed by: INTERNAL MEDICINE

## 2025-08-26 PROCEDURE — 93306 TTE W/DOPPLER COMPLETE: CPT

## 2025-09-02 LAB
ABO GROUP (TYPE) IN BLOOD: NORMAL
ANTIBODY SCREEN: NORMAL
RH FACTOR (ANTIGEN D): NORMAL

## 2025-09-02 PROCEDURE — 86901 BLOOD TYPING SEROLOGIC RH(D): CPT

## 2025-09-02 PROCEDURE — 86850 RBC ANTIBODY SCREEN: CPT

## 2025-09-02 PROCEDURE — 86900 BLOOD TYPING SEROLOGIC ABO: CPT

## 2025-09-03 ENCOUNTER — ANESTHESIA (OUTPATIENT)
Dept: CARDIOLOGY | Facility: HOSPITAL | Age: 75
End: 2025-09-03
Payer: MEDICARE

## 2025-09-03 ENCOUNTER — ANESTHESIA EVENT (OUTPATIENT)
Dept: CARDIOLOGY | Facility: HOSPITAL | Age: 75
End: 2025-09-03
Payer: MEDICARE

## 2025-09-03 ENCOUNTER — HOSPITAL ENCOUNTER (OUTPATIENT)
Facility: HOSPITAL | Age: 75
Setting detail: OUTPATIENT SURGERY
Discharge: HOME | End: 2025-09-03
Attending: INTERNAL MEDICINE | Admitting: INTERNAL MEDICINE
Payer: MEDICARE

## 2025-09-03 VITALS
OXYGEN SATURATION: 94 % | TEMPERATURE: 96.8 F | SYSTOLIC BLOOD PRESSURE: 144 MMHG | RESPIRATION RATE: 16 BRPM | DIASTOLIC BLOOD PRESSURE: 65 MMHG | BODY MASS INDEX: 23.6 KG/M2 | HEART RATE: 56 BPM | WEIGHT: 125 LBS | HEIGHT: 61 IN

## 2025-09-03 DIAGNOSIS — I48.0 PAROXYSMAL ATRIAL FIBRILLATION (MULTI): ICD-10-CM

## 2025-09-03 DIAGNOSIS — I48.91 ATRIAL FIBRILLATION WITH RAPID VENTRICULAR RESPONSE (MULTI): ICD-10-CM

## 2025-09-03 LAB
ACT BLD: 343 SEC (ref 89–169)
ANION GAP SERPL CALCULATED.4IONS-SCNC: 9 MMOL/L (CALC) (ref 7–17)
BUN SERPL-MCNC: 18 MG/DL (ref 7–25)
BUN/CREAT SERPL: NORMAL (CALC) (ref 6–22)
CALCIUM SERPL-MCNC: 9.6 MG/DL (ref 8.6–10.4)
CHLORIDE SERPL-SCNC: 104 MMOL/L (ref 98–110)
CO2 SERPL-SCNC: 26 MMOL/L (ref 20–32)
CREAT SERPL-MCNC: 0.8 MG/DL (ref 0.6–1)
EGFRCR SERPLBLD CKD-EPI 2021: 77 ML/MIN/1.73M2
ERYTHROCYTE [DISTWIDTH] IN BLOOD BY AUTOMATED COUNT: 14.3 % (ref 11–15)
GLUCOSE SERPL-MCNC: 80 MG/DL (ref 65–139)
HCT VFR BLD AUTO: 43.7 % (ref 35–45)
HGB BLD-MCNC: 13.8 G/DL (ref 11.7–15.5)
MCH RBC QN AUTO: 29.3 PG (ref 27–33)
MCHC RBC AUTO-ENTMCNC: 31.6 G/DL (ref 32–36)
MCV RBC AUTO: 92.8 FL (ref 80–100)
PLATELET # BLD AUTO: 263 THOUSAND/UL (ref 140–400)
PMV BLD REES-ECKER: 10.6 FL (ref 7.5–12.5)
POTASSIUM SERPL-SCNC: 4.8 MMOL/L (ref 3.5–5.3)
RBC # BLD AUTO: 4.71 MILLION/UL (ref 3.8–5.1)
SODIUM SERPL-SCNC: 139 MMOL/L (ref 135–146)
WBC # BLD AUTO: 6.6 THOUSAND/UL (ref 3.8–10.8)

## 2025-09-03 PROCEDURE — 93657 TX L/R ATRIAL FIB ADDL: CPT | Performed by: INTERNAL MEDICINE

## 2025-09-03 PROCEDURE — C1759 CATH, INTRA ECHOCARDIOGRAPHY: HCPCS | Performed by: INTERNAL MEDICINE

## 2025-09-03 PROCEDURE — C1730 CATH, EP, 19 OR FEW ELECT: HCPCS | Performed by: INTERNAL MEDICINE

## 2025-09-03 PROCEDURE — C1733 CATH, EP, OTHR THAN COOL-TIP: HCPCS | Performed by: INTERNAL MEDICINE

## 2025-09-03 PROCEDURE — 3700000001 HC GENERAL ANESTHESIA TIME - INITIAL BASE CHARGE: Performed by: INTERNAL MEDICINE

## 2025-09-03 PROCEDURE — 36415 COLL VENOUS BLD VENIPUNCTURE: CPT | Performed by: INTERNAL MEDICINE

## 2025-09-03 PROCEDURE — 3700000002 HC GENERAL ANESTHESIA TIME - EACH INCREMENTAL 1 MINUTE: Performed by: INTERNAL MEDICINE

## 2025-09-03 PROCEDURE — 2720000007 HC OR 272 NO HCPCS: Performed by: INTERNAL MEDICINE

## 2025-09-03 PROCEDURE — 2500000004 HC RX 250 GENERAL PHARMACY W/ HCPCS (ALT 636 FOR OP/ED): Performed by: INTERNAL MEDICINE

## 2025-09-03 PROCEDURE — 85347 COAGULATION TIME ACTIVATED: CPT

## 2025-09-03 PROCEDURE — C1760 CLOSURE DEV, VASC: HCPCS | Performed by: INTERNAL MEDICINE

## 2025-09-03 PROCEDURE — A93656 PR EPHYS EVL TRNSPTL TX ATRIAL FIB ISOLAT PULM VEIN

## 2025-09-03 PROCEDURE — C1887 CATHETER, GUIDING: HCPCS | Performed by: INTERNAL MEDICINE

## 2025-09-03 PROCEDURE — 7100000009 HC PHASE TWO TIME - INITIAL BASE CHARGE: Performed by: INTERNAL MEDICINE

## 2025-09-03 PROCEDURE — C1766 INTRO/SHEATH,STRBLE,NON-PEEL: HCPCS | Performed by: INTERNAL MEDICINE

## 2025-09-03 PROCEDURE — C1769 GUIDE WIRE: HCPCS | Performed by: INTERNAL MEDICINE

## 2025-09-03 PROCEDURE — 2780000003 HC OR 278 NO HCPCS: Performed by: INTERNAL MEDICINE

## 2025-09-03 PROCEDURE — C1894 INTRO/SHEATH, NON-LASER: HCPCS | Performed by: INTERNAL MEDICINE

## 2025-09-03 PROCEDURE — 7100000001 HC RECOVERY ROOM TIME - INITIAL BASE CHARGE: Performed by: INTERNAL MEDICINE

## 2025-09-03 PROCEDURE — 99100 ANES PT EXTEME AGE<1 YR&>70: CPT | Performed by: ANESTHESIOLOGY

## 2025-09-03 PROCEDURE — 7100000010 HC PHASE TWO TIME - EACH INCREMENTAL 1 MINUTE: Performed by: INTERNAL MEDICINE

## 2025-09-03 PROCEDURE — 7100000002 HC RECOVERY ROOM TIME - EACH INCREMENTAL 1 MINUTE: Performed by: INTERNAL MEDICINE

## 2025-09-03 PROCEDURE — A93656 PR EPHYS EVL TRNSPTL TX ATRIAL FIB ISOLAT PULM VEIN: Performed by: ANESTHESIOLOGY

## 2025-09-03 PROCEDURE — 93656 COMPRE EP EVAL ABLTJ ATR FIB: CPT | Performed by: INTERNAL MEDICINE

## 2025-09-03 PROCEDURE — 2500000004 HC RX 250 GENERAL PHARMACY W/ HCPCS (ALT 636 FOR OP/ED): Mod: JW

## 2025-09-03 PROCEDURE — 2500000004 HC RX 250 GENERAL PHARMACY W/ HCPCS (ALT 636 FOR OP/ED): Performed by: NURSE ANESTHETIST, CERTIFIED REGISTERED

## 2025-09-03 RX ORDER — PROPOFOL 10 MG/ML
INJECTION, EMULSION INTRAVENOUS AS NEEDED
Status: DISCONTINUED | OUTPATIENT
Start: 2025-09-03 | End: 2025-09-03

## 2025-09-03 RX ORDER — HEPARIN SODIUM 10000 [USP'U]/100ML
INJECTION, SOLUTION INTRAVENOUS AS NEEDED
Status: DISCONTINUED | OUTPATIENT
Start: 2025-09-03 | End: 2025-09-03

## 2025-09-03 RX ORDER — SODIUM CHLORIDE 9 MG/ML
INJECTION, SOLUTION INTRAVENOUS CONTINUOUS PRN
Status: DISCONTINUED | OUTPATIENT
Start: 2025-09-03 | End: 2025-09-03

## 2025-09-03 RX ORDER — NORETHINDRONE AND ETHINYL ESTRADIOL 0.5-0.035
KIT ORAL CONTINUOUS PRN
Status: DISCONTINUED | OUTPATIENT
Start: 2025-09-03 | End: 2025-09-03

## 2025-09-03 RX ORDER — LIDOCAINE HYDROCHLORIDE 20 MG/ML
INJECTION, SOLUTION INFILTRATION; PERINEURAL AS NEEDED
Status: DISCONTINUED | OUTPATIENT
Start: 2025-09-03 | End: 2025-09-03

## 2025-09-03 RX ORDER — ONDANSETRON HYDROCHLORIDE 2 MG/ML
INJECTION, SOLUTION INTRAVENOUS AS NEEDED
Status: DISCONTINUED | OUTPATIENT
Start: 2025-09-03 | End: 2025-09-03

## 2025-09-03 RX ORDER — METOPROLOL TARTRATE 25 MG/1
12.5 TABLET, FILM COATED ORAL 2 TIMES DAILY
Qty: 90 TABLET | Refills: 3 | Status: SHIPPED | OUTPATIENT
Start: 2025-09-03 | End: 2026-09-03

## 2025-09-03 RX ORDER — HEPARIN SODIUM 1000 [USP'U]/ML
INJECTION, SOLUTION INTRAVENOUS; SUBCUTANEOUS AS NEEDED
Status: DISCONTINUED | OUTPATIENT
Start: 2025-09-03 | End: 2025-09-03

## 2025-09-03 RX ORDER — MIDAZOLAM HYDROCHLORIDE 1 MG/ML
INJECTION, SOLUTION INTRAMUSCULAR; INTRAVENOUS CONTINUOUS PRN
Status: DISCONTINUED | OUTPATIENT
Start: 2025-09-03 | End: 2025-09-03

## 2025-09-03 RX ORDER — FENTANYL CITRATE 50 UG/ML
INJECTION, SOLUTION INTRAMUSCULAR; INTRAVENOUS AS NEEDED
Status: DISCONTINUED | OUTPATIENT
Start: 2025-09-03 | End: 2025-09-03

## 2025-09-03 RX ORDER — ESMOLOL HYDROCHLORIDE 10 MG/ML
INJECTION INTRAVENOUS AS NEEDED
Status: DISCONTINUED | OUTPATIENT
Start: 2025-09-03 | End: 2025-09-03

## 2025-09-03 RX ORDER — PHENYLEPHRINE HYDROCHLORIDE 10 MG/ML
INJECTION INTRAVENOUS AS NEEDED
Status: DISCONTINUED | OUTPATIENT
Start: 2025-09-03 | End: 2025-09-03

## 2025-09-03 RX ORDER — BUPIVACAINE HYDROCHLORIDE 2.5 MG/ML
INJECTION, SOLUTION EPIDURAL; INFILTRATION; INTRACAUDAL; PERINEURAL AS NEEDED
Status: DISCONTINUED | OUTPATIENT
Start: 2025-09-03 | End: 2025-09-03 | Stop reason: HOSPADM

## 2025-09-03 RX ADMIN — PHENYLEPHRINE HYDROCHLORIDE 50 MCG: 10 INJECTION INTRAVENOUS at 11:36

## 2025-09-03 RX ADMIN — ONDANSETRON HYDROCHLORIDE 4 MG: 2 INJECTION INTRAMUSCULAR; INTRAVENOUS at 12:07

## 2025-09-03 RX ADMIN — PHENYLEPHRINE HYDROCHLORIDE 100 MCG: 10 INJECTION INTRAVENOUS at 11:40

## 2025-09-03 RX ADMIN — LIDOCAINE HYDROCHLORIDE 40 MG: 20 INJECTION, SOLUTION INFILTRATION; PERINEURAL at 11:18

## 2025-09-03 RX ADMIN — SUGAMMADEX 150 MG: 100 INJECTION, SOLUTION INTRAVENOUS at 12:11

## 2025-09-03 RX ADMIN — DEXAMETHASONE SODIUM PHOSPHATE 4 MG: 4 INJECTION, SOLUTION INTRAMUSCULAR; INTRAVENOUS at 11:33

## 2025-09-03 RX ADMIN — ESMOLOL HYDROCHLORIDE 20 MG: 10 INJECTION, SOLUTION INTRAVENOUS at 11:23

## 2025-09-03 RX ADMIN — HEPARIN SODIUM 5000 UNITS: 1000 INJECTION INTRAVENOUS; SUBCUTANEOUS at 11:42

## 2025-09-03 RX ADMIN — HEPARIN SODIUM 10 UNITS/KG/HR: 10000 INJECTION, SOLUTION INTRAVENOUS at 11:43

## 2025-09-03 RX ADMIN — SODIUM CHLORIDE: 9 INJECTION, SOLUTION INTRAVENOUS at 11:17

## 2025-09-03 RX ADMIN — FENTANYL CITRATE 50 MCG: 50 INJECTION, SOLUTION INTRAMUSCULAR; INTRAVENOUS at 12:20

## 2025-09-03 RX ADMIN — PROPOFOL 120 MG: 10 INJECTION, EMULSION INTRAVENOUS at 11:18

## 2025-09-03 SDOH — HEALTH STABILITY: MENTAL HEALTH: CURRENT SMOKER: 0

## 2025-09-03 ASSESSMENT — PAIN SCALES - GENERAL
PAINLEVEL_OUTOF10: 0 - NO PAIN
PAIN_LEVEL: 0
PAINLEVEL_OUTOF10: 0 - NO PAIN

## 2025-09-03 ASSESSMENT — PAIN - FUNCTIONAL ASSESSMENT
PAIN_FUNCTIONAL_ASSESSMENT: 0-10

## 2025-09-04 ASSESSMENT — PAIN SCALES - GENERAL: PAINLEVEL_OUTOF10: 0 - NO PAIN

## 2025-09-11 ENCOUNTER — APPOINTMENT (OUTPATIENT)
Dept: PREADMISSION TESTING | Facility: HOSPITAL | Age: 75
End: 2025-09-11
Payer: MEDICARE

## 2025-10-24 ENCOUNTER — APPOINTMENT (OUTPATIENT)
Facility: CLINIC | Age: 75
End: 2025-10-24
Payer: MEDICARE

## 2025-11-13 ENCOUNTER — APPOINTMENT (OUTPATIENT)
Dept: PRIMARY CARE | Facility: CLINIC | Age: 75
End: 2025-11-13
Payer: MEDICARE

## (undated) DEVICE — GLOVE, SURGICAL, PROTEXIS PI BLUE W/NEUTHERA, 7.5, PF, LF

## (undated) DEVICE — ELECTRODE, ELECTROSURGICAL, BLADE, EXTENDED

## (undated) DEVICE — BLADE, SAW,SAGGITAL, THICK, NO OFFSET

## (undated) DEVICE — DRAPE, INCISE, ANTIMICROBIAL, IOBAN 2, LARGE, 17 X 23 IN, DISPOSABLE, STERILE

## (undated) DEVICE — WOUND SYSTEM, DEBRIDEMENT & CLEANING, O.R DUOPAK

## (undated) DEVICE — COVER, EQUIPMENT, ZERO GRAVITY

## (undated) DEVICE — CABLE, OCTAPOLAR, EASYMATE 8 125CML

## (undated) DEVICE — STRIP, SKIN CLOSURE, STERI STRIP, REINFORCED, 0.5 X 4 IN

## (undated) DEVICE — CLOSURE SYSTEM, VASCULAR, MVP 6-12FR, VENOUS

## (undated) DEVICE — DRAPE, ORTHOMAX UP, 108 X 90IN, STERILE

## (undated) DEVICE — CATHETER, ELECTRODE, STEERABLE, EP-XT, LARGE CURVE, 6 FR X 110 CM

## (undated) DEVICE — Device

## (undated) DEVICE — CLOSURE SYSTEM, VASCADE MVP XL, 10-12FR

## (undated) DEVICE — ELECTRODE KIT, ENSITE X EP SYSTEM SURFACE

## (undated) DEVICE — INTRODUCER, SHEATH, FAST-CATH, 6 FR X 23 CM

## (undated) DEVICE — SUTURE, MONOCRYL, 4-0, 27 IN, PS-2, UNDYED

## (undated) DEVICE — DRAPE, ISOLATION, INCISE, W/POUCH, STERI DRAPE, 125 X 83 IN, DISPOSABLE, STERILE

## (undated) DEVICE — CATHETER, VIEW FLEX XTRA ICE, 9FR

## (undated) DEVICE — DRESSING, MEPILEX BORDER, POST-OP AG, 4 X 12 IN

## (undated) DEVICE — CATHETER, ELCTROPHYSIOLOGY, DIAGNOSTIC, SUPREME, 4 ELECTRODE, 2-5-2 MM SPACING, JOSEPHSON CURVE, 6 FR X 120 CM

## (undated) DEVICE — GLOVE, SURGICAL, PROTEXIS PI , 7.5, PF, LF

## (undated) DEVICE — GUIDEWIRE, AMPLATZ, TFE, EXTRA STIFF, CURVED, .032/145CM/3MMTIP

## (undated) DEVICE — FACE SHIELD, OPTI-COM

## (undated) DEVICE — SUTURE, PDS II, 1, 54 IN, XLH, VIOLET

## (undated) DEVICE — INTRODUCER, SHEATH, FAST-CATH, 7FR X 12CM, C-LOCK

## (undated) DEVICE — SUTURE, PDS II, 1, 36 IN, CT, VIOLET

## (undated) DEVICE — CABLE CONNECTION, CATHETER, PFA RX-HRD

## (undated) DEVICE — SOLUTION, IRRIGATION, X RX SODIUM CHL 0.9%, 1000ML BTL

## (undated) DEVICE — SUTURE, VICRYL, 2-0, 36 IN, CT-1, UNDYED

## (undated) DEVICE — PAD, ELECTRODE DEFIB PADPRO ADULT STRL W/ADAPTER

## (undated) DEVICE — CATHETER, ABLATION, PULSED FIELD, FARAWAVE 31 MM

## (undated) DEVICE — INTRODUCER SET, ULTIMUM IV, 12FR X 12CM

## (undated) DEVICE — SHEATH, STEERABLE, FARADRIVE, CLEAR

## (undated) DEVICE — GUIDEWIRE, INQWIRE, .035 X 180CM, 1.5 J-TIP

## (undated) DEVICE — SPONGE, HEMOSTATIC, CELLULOSE, SURGICEL, 2 X 3 IN

## (undated) DEVICE — PILLOW, ABDUCTION, MEDIUM

## (undated) DEVICE — INTRODUCER, SHEATH, AVANTI, 10 FR X 11 CM